# Patient Record
Sex: FEMALE | Race: OTHER | HISPANIC OR LATINO | URBAN - METROPOLITAN AREA
[De-identification: names, ages, dates, MRNs, and addresses within clinical notes are randomized per-mention and may not be internally consistent; named-entity substitution may affect disease eponyms.]

---

## 2020-09-07 ENCOUNTER — EMERGENCY (EMERGENCY)
Facility: HOSPITAL | Age: 45
LOS: 1 days | Discharge: HOME | End: 2020-09-07
Admitting: EMERGENCY MEDICINE
Payer: COMMERCIAL

## 2020-09-07 VITALS
RESPIRATION RATE: 18 BRPM | HEART RATE: 72 BPM | DIASTOLIC BLOOD PRESSURE: 79 MMHG | OXYGEN SATURATION: 100 % | SYSTOLIC BLOOD PRESSURE: 174 MMHG

## 2020-09-07 VITALS
TEMPERATURE: 98 F | DIASTOLIC BLOOD PRESSURE: 79 MMHG | HEART RATE: 75 BPM | SYSTOLIC BLOOD PRESSURE: 171 MMHG | OXYGEN SATURATION: 100 % | RESPIRATION RATE: 18 BRPM

## 2020-09-07 DIAGNOSIS — Z98.890 OTHER SPECIFIED POSTPROCEDURAL STATES: Chronic | ICD-10-CM

## 2020-09-07 DIAGNOSIS — Z90.711 ACQUIRED ABSENCE OF UTERUS WITH REMAINING CERVICAL STUMP: Chronic | ICD-10-CM

## 2020-09-07 PROCEDURE — 99283 EMERGENCY DEPT VISIT LOW MDM: CPT

## 2020-09-07 PROCEDURE — 73610 X-RAY EXAM OF ANKLE: CPT | Mod: 26,RT

## 2020-09-07 RX ORDER — IBUPROFEN 200 MG
600 TABLET ORAL ONCE
Refills: 0 | Status: COMPLETED | OUTPATIENT
Start: 2020-09-07 | End: 2020-09-07

## 2020-09-07 RX ADMIN — Medication 600 MILLIGRAM(S): at 14:44

## 2020-09-07 NOTE — ED PROVIDER NOTE - PHYSICAL EXAMINATION
RLE: NV intact, +swelling and TTP over lateral malleolus, moving all toes,  No TTP to foot/knee.  FROM of knee.

## 2020-09-07 NOTE — ED PROVIDER NOTE - NSFOLLOWUPINSTRUCTIONS_ED_ALL_ED_FT
Follow up with orthopedics in 1-2 days.  (See list attached)  Rest, Ice 3-4 x a day x 48hours, elevate ankle, ace/aircast.  Use crutches to ambulate.  Take Ibuprofen 600mg orally every 8 hours as needed for pain take with food.  Return to the ER for any persistent/worsening or new symptoms weakness, numbness or any concerning symptoms. Follow up with orthopedics in 1-2 days.  (See list attached)    Follow up with your Doctor for blood pressure check.  Rest, Ice 3-4 x a day x 48hours, elevate ankle, ace/aircast.    Use crutches to ambulate.    Take Ibuprofen 400mg orally every 6 hours as needed for pain take with food.    Return to the ER for any persistent/worsening or new symptoms weakness, numbness or any concerning symptoms.

## 2020-09-07 NOTE — ED PROVIDER NOTE - CPE EDP CARDIAC NORM
Quality 431: Preventive Care And Screening: Unhealthy Alcohol Use - Screening: Patient screened for unhealthy alcohol use using a single question and scores less than 2 times per year Quality 130: Documentation Of Current Medications In The Medical Record: Current Medications Documented Quality 226: Preventive Care And Screening: Tobacco Use: Screening And Cessation Intervention: Patient screened for tobacco and is an ex-smoker Detail Level: Detailed Quality 110: Preventive Care And Screening: Influenza Immunization: Influenza Immunization Ordered or Recommended, but not Administered due to system reason Additional Notes: Thomas carpenter want flu vaccine normal...

## 2020-09-07 NOTE — ED PROVIDER NOTE - OBJECTIVE STATEMENT
45 y/o female with no significant PMHx presents to the ER c/o right ankle pain and swelling s/p injury prior to arrival.  Pt states her foot got caught in a crack and her ankle twisted and she fell to the floor.  Pt denies head trauma, loc, weakness, numbness, tingling or any other injuries.

## 2020-09-07 NOTE — ED ADULT TRIAGE NOTE - CHIEF COMPLAINT QUOTE
PT c/o right ankle swelling, pain and limited mobility. Pt tripped on a crack in the sidewalk and twisted her ankle about an hour ago. PT denies any LOC, head trauma or PMH

## 2020-09-14 ENCOUNTER — EMERGENCY (EMERGENCY)
Facility: HOSPITAL | Age: 45
LOS: 1 days | Discharge: HOME | End: 2020-09-14
Attending: HOSPITALIST | Admitting: HOSPITALIST
Payer: COMMERCIAL

## 2020-09-14 VITALS
HEART RATE: 80 BPM | RESPIRATION RATE: 17 BRPM | OXYGEN SATURATION: 99 % | SYSTOLIC BLOOD PRESSURE: 141 MMHG | DIASTOLIC BLOOD PRESSURE: 95 MMHG

## 2020-09-14 VITALS
SYSTOLIC BLOOD PRESSURE: 150 MMHG | OXYGEN SATURATION: 99 % | TEMPERATURE: 99 F | RESPIRATION RATE: 18 BRPM | HEART RATE: 87 BPM | DIASTOLIC BLOOD PRESSURE: 88 MMHG

## 2020-09-14 DIAGNOSIS — Z98.890 OTHER SPECIFIED POSTPROCEDURAL STATES: Chronic | ICD-10-CM

## 2020-09-14 DIAGNOSIS — Z90.711 ACQUIRED ABSENCE OF UTERUS WITH REMAINING CERVICAL STUMP: Chronic | ICD-10-CM

## 2020-09-14 PROCEDURE — 99282 EMERGENCY DEPT VISIT SF MDM: CPT

## 2020-09-14 RX ORDER — IBUPROFEN 200 MG
600 TABLET ORAL ONCE
Refills: 0 | Status: COMPLETED | OUTPATIENT
Start: 2020-09-14 | End: 2020-09-14

## 2020-09-14 RX ADMIN — Medication 600 MILLIGRAM(S): at 20:48

## 2020-09-14 NOTE — ED PROVIDER NOTE - NS ED ROS FT
Gen: No fever, chills, night sweats. Normal appetite  Eyes: No changes in vision   ENT: No ear pain, congestion, sore throat  Resp: No cough or trouble breathing  Cardiovascular: No chest pain or palpitation  Gastroenteric: No nausea, vomiting, diarrhea or constipation  :  No change in urine output, dysuria or hematuria   MS: No joint or muscle pain  Skin: No rashes, lacerations, wounds or abrasions   Neuro: No headache; no abnormal movements

## 2020-09-14 NOTE — ED PROVIDER NOTE - OBJECTIVE STATEMENT
43 y/o female with no significant PMHx presents to ED c/o R ankle pain after falling 1 week ago. Pt twisted her R ankle while walking and fell on pavement. She presented to ED after for evaluation where an x-ray of her R ankle was found to be normal and was sent home with Motrin and and air cast on her R ankle. 2 days after ED visit, pt took off the air cast and tried to walk but ankle was still painful and pt was unable to ambulate. Pt has since noticed increased swelling of R leg. No fevers or chills, night sweats, laceration or abrasions over the areaa. No other acute complaints at time of eval.

## 2020-09-14 NOTE — ED ADULT TRIAGE NOTE - CHIEF COMPLAINT QUOTE
Pt complaining of R ankle pain and swelling. Pt states she was seen in the ED a week ago and is not feeling better.

## 2020-09-14 NOTE — ED PROVIDER NOTE - NSFOLLOWUPCLINICS_GEN_ALL_ED_FT
[facility] Orthopedic Surgery  Orthopedic Surgery  [address], [address]rd & [address]th [place] [address]  Phone: [phone]  Fax:   Follow Up Time:

## 2020-09-14 NOTE — ED PROVIDER NOTE - PATIENT PORTAL LINK FT
You can access the FollowMyHealth Patient Portal offered by [facility] by registering at the following website: [email or web address]. By joining [employer]’s FollowMyHealth portal, you will also be able to view your health information using other applications (apps) compatible with our system.

## 2020-09-14 NOTE — ED PROVIDER NOTE - CLINICAL SUMMARY MEDICAL DECISION MAKING FREE TEXT BOX
43 y/o female with no pertinent PMHx with R ankle pain. Suspect injury sustained one week ago. Inquired if pt would like CT as some fractures can be missed by x-ray, pt denied inquiry and will f/u with orthopedics outpatient. Asked pt to continue Motrin and Tylenol on an as needed basis. Will wrap with ace bandage and send her home. 45 y/o female with no pertinent PMHx with R ankle pain. Suspect injury sustained one week ago. Inquired if pt would like CT as some fractures can be missed by x-ray, pt denied inquiry and will f/u with orthopedics outpatient. Asked pt to continue Motrin and Tylenol on an as needed basis. Will wrap with ace bandage and dc home with f/u with ortho, strict return precautions advised, pt voiced understanding. Will give work note, ok to return keep R foot elevated and use ice/hot packs as needed.

## 2020-09-14 NOTE — ED ADULT NURSE NOTE - OBJECTIVE STATEMENT
44 year old female, was here a week ago for right ankle pain secondary to a fall, as per pt she was told she had a sprain and it should improve after two days, not improvement as per pt, pain and swelling got worst. Pain8/10, feels like pressure and radiating from  ankle to shin. edema noted to ankle (+2), foot and shin. no discoloration noted, warm to touch.  pedal pulse present,   pt able to feel sensation of right foot, pt able to move toes. Limited ROM in yunier. Pt medicated for pain will monitor pt.

## 2020-09-14 NOTE — ED PROVIDER NOTE - ATTENDING CONTRIBUTION TO CARE
44F present with right ankle pain after twisting it a week ago. patient was seen in ED and xrays negative for fx. since has noted bruising and swelling despite taking ibuprofen for pain and using the aircast and crutches for the first few days since injury but now states she cant wear the air cast bc it is too uncomfortable. no calf pain. no fevers or re-injury.     ***GEN - NAD; well appearing; A+O x3 ***HEAD - NC/AT ***EYES/NOSE - PERRL, EOMI, mucous membranes moist, no discharge ***THROAT: Oral cavity and pharynx normal. No inflammation, swelling, exudate, or lesions.  ***NECK: Neck supple, non-tender without lymphadenopathy, no masses, no thyromegaly.  ***PULMONARY - CTA b/l, symmetric breath sounds. ***CARDIAC -s1s2, RRR, no M,G,R  ***ABDOMEN - +BS, ND, NT, soft, no guarding, no rebound, no masses   ***BACK - no CVA tenderness, Normal  spine ***EXTREMITIES - swelling to right ankle. symmetric pulses, 2+ dp, capillary refill < 2 seconds, no clubbing, no cyanosis, no edema ***SKIN - bruising to right ankle and lateral foot.  ***NEUROLOGIC - alert, CN 2-12 intact    MDM: 44F with right ankle sprain. will ace wrap and made suggestions for elevation, nsaids and ortho f/u as outpt given.

## 2020-09-14 NOTE — ED PROVIDER NOTE - PHYSICAL EXAMINATION
MSK: +swelling of lateral malleolus on R foot. +ttp to lateral malleolus on r side.  +decreased ROM of R ankle secondary to pain.     Skin:  + bruising on R mid tibial shaft. No open wounds, lacerations or abrasions. MSK: +swelling of lateral malleolus on R foot. +ttp to lateral malleolus on r side.  +decreased ROM of R ankle secondary to pain.   Skin:  + bruising on R mid tibial shaft. No open wounds, lacerations or abrasions.  CONSTITUTIONAL: NAD. Awake and conversive, cooperative with exam  EYES: EOMI, conjunctiva and sclera clear  ENMT: MMM, no pharyngeal injection or exudates   NECK: Supple, no palpable masses, no JVD  RESPIRATORY: Normal respiratory effort, CTAB, no wheezes, rales, or rhonchi  CARDIOVASCULAR: RRR, normal S1 and S2, no MRG, distal pulses 2+ bilaterally, capillary refill < 2 seconds, no peripheral edema  ABDOMEN: Soft, non-distended, no TTP, +BS, no rebound/guarding  NEURO: AO to person, place, and time; following commands, moving all extremities spontaneously  PSYCH: appropriate affect

## 2020-09-14 NOTE — ED PROVIDER NOTE - NSFOLLOWUPINSTRUCTIONS_ED_ALL_ED_FT
You were seen in the emergency department for right ankle pain.     - Please continue taking all of your home medications as directed.  - You may alternate between Tylenol and Ibuprofen as needed for the pain. This is an over-the-counter medications - please respect the warnings on the label. This medication come with certain risks and side effects that you need to discuss with your doctor, especially if you are taking it for a prolonged period.  - Please follow up with your PMD in the next 24-48hrs.  - Please follow up with an orthopedic doctor within the next 7-10 days.  - Please return to the ED if you have any new or concerning symptoms.  - If you have any severe increase in pain, continued inability to ambulate, feel numbness or tingling in the extremity you need to come right back to the emergency room.

## 2021-09-30 ENCOUNTER — EMERGENCY (EMERGENCY)
Facility: HOSPITAL | Age: 46
LOS: 1 days | Discharge: HOME | End: 2021-09-30
Attending: EMERGENCY MEDICINE | Admitting: EMERGENCY MEDICINE
Payer: COMMERCIAL

## 2021-09-30 VITALS
DIASTOLIC BLOOD PRESSURE: 95 MMHG | SYSTOLIC BLOOD PRESSURE: 144 MMHG | TEMPERATURE: 97 F | RESPIRATION RATE: 16 BRPM | HEART RATE: 90 BPM | OXYGEN SATURATION: 100 %

## 2021-09-30 DIAGNOSIS — Z90.711 ACQUIRED ABSENCE OF UTERUS WITH REMAINING CERVICAL STUMP: Chronic | ICD-10-CM

## 2021-09-30 DIAGNOSIS — Z98.890 OTHER SPECIFIED POSTPROCEDURAL STATES: Chronic | ICD-10-CM

## 2021-09-30 PROCEDURE — 99283 EMERGENCY DEPT VISIT LOW MDM: CPT

## 2021-09-30 RX ADMIN — Medication 1 TABLET(S): at 23:51

## 2021-09-30 RX ADMIN — Medication 300 MILLIGRAM(S): at 23:51

## 2021-09-30 NOTE — ED PROVIDER NOTE - CLINICAL SUMMARY MEDICAL DECISION MAKING FREE TEXT BOX
45yF w/no pmhx presenting with right eyelid pain and swelling x 2 days after plucking hair from eyebrow. On exam pt is well appearing, afebrile. Left eyelid swelling/erythema, small area of induration surrounding small scab below left eyebrow. no pain with EOMs. Concern for preseptal cellulitis. Plan: oral abx, warm compress

## 2021-09-30 NOTE — ED PROVIDER NOTE - OBJECTIVE STATEMENT
45yF w/no pmhx presenting with right eyelid pain and swelling. Pt states 2 days ago she tried to pluck out a hair from her eyebrow, thought she saw a black spot and was concerned for an ingrown hair. Since then pt has pain, swelling and redness to the area, worse today. Pt denies visual changes, eye pain, tearing, drainage from eye, headache, dizziness, weakness, URI symptoms, cp, sob, abd pain, n/v/d, recent travel or illness or any other concerns. 45yF w/no pmhx presenting with right eyelid pain and swelling. Pt states 2 days ago she tried to pluck out a hair from her eyebrow, thought she saw a black spot and was concerned for an ingrown hair. Since then pt has pain, swelling and redness to the area, worse today. Pt denies visual changes, eye pain, tearing, drainage from eye, headache, dizziness, weakness, URI symptoms, cp, sob, abd pain, n/v/d, recent travel or illness or any other concerns.    Attending/Ina: 46 yo F as described above, p/w left eye pain for the past two days. After plucking her eyebrow pt develop left periorbital pain. Pt though she had an ingorwn hair and attempted to use a tweezer to probe the area. Pt denies fever/chills, change in eyesight, HA, n/v. 45yF w/no pmhx presenting with left eyelid pain and swelling. Pt states 2 days ago she tried to pluck out a hair from her eyebrow, thought she saw a black spot and was concerned for an ingrown hair. Since then pt has pain, swelling and redness to the area, worse today. Pt denies visual changes, eye pain, tearing, drainage from eye, headache, dizziness, weakness, URI symptoms, cp, sob, abd pain, n/v/d, recent travel or illness or any other concerns.    Attending/Ina: 44 yo F as described above, p/w left eye pain for the past two days. After plucking her eyebrow pt develop left periorbital pain. Pt though she had an ingorwn hair and attempted to use a tweezer to probe the area. Pt denies fever/chills, change in eyesight, HA, n/v.

## 2021-09-30 NOTE — ED ADULT TRIAGE NOTE - CHIEF COMPLAINT QUOTE
Pt. with swelling above L eye. stating she was plucking her eyebrow a few days ago and recently noted swelling above her eye that is getting worst. noted with a boil above L eye.

## 2021-09-30 NOTE — ED PROVIDER NOTE - PHYSICAL EXAMINATION
left upper eyelid with swelling, erythema, small area of induration surrounding small scab below left eyebrow  no pain with EOMS  PERRL, conjunctiva clear b/l left upper eyelid with swelling, erythema, small area of induration surrounding small scab below left eyebrow  no pain with EOMS  PERRL, conjunctiva clear b/l    Attending/Ina: NAD, PERRL/EOMI, no proptosis, left periorbital/eyelid swelling, +erythema; supple, RRR, CTAB, Abd-soft, NT/ND, no LE edema, +2 DP/PT, A&Ox3, nonfocal

## 2021-09-30 NOTE — ED PROVIDER NOTE - NSFOLLOWUPINSTRUCTIONS_ED_ALL_ED_FT
Follow up with your primary care doctor within 1 week  Take Augmentin 875mg (1 tablet) twice daily for 7 days  Take Clindamycin 300mg (1 tablet) three times a day for 7 days  Warm compress to area  Take Tylenol 650mg every 6 hours as needed for pain  Return to the ER with any worsening or concerning symptoms, increased pain, spread of redness, swelling, fever/chills, eye pain or any other concerns.

## 2021-09-30 NOTE — ED PROVIDER NOTE - PATIENT PORTAL LINK FT
You can access the FollowMyHealth Patient Portal offered by [facility] by registering at the following website: [email or web address]. By joining [employer]’s FollowMyHealth portal, you will also be able to view your health information using other applications (apps) compatible with our system.

## 2021-10-03 ENCOUNTER — EMERGENCY (EMERGENCY)
Facility: HOSPITAL | Age: 46
LOS: 1 days | Discharge: HOME | End: 2021-10-03
Attending: EMERGENCY MEDICINE | Admitting: EMERGENCY MEDICINE
Payer: COMMERCIAL

## 2021-10-03 VITALS
SYSTOLIC BLOOD PRESSURE: 139 MMHG | OXYGEN SATURATION: 100 % | DIASTOLIC BLOOD PRESSURE: 93 MMHG | HEART RATE: 90 BPM | TEMPERATURE: 98 F | WEIGHT: 167.99 LBS | RESPIRATION RATE: 18 BRPM

## 2021-10-03 DIAGNOSIS — Z90.711 ACQUIRED ABSENCE OF UTERUS WITH REMAINING CERVICAL STUMP: Chronic | ICD-10-CM

## 2021-10-03 DIAGNOSIS — Z98.890 OTHER SPECIFIED POSTPROCEDURAL STATES: Chronic | ICD-10-CM

## 2021-10-03 LAB
A1C WITH ESTIMATED AVERAGE GLUCOSE RESULT: 12.7 % — HIGH (ref 4–5.6)
ALBUMIN SERPL ELPH-MCNC: 4.5 G/DL — SIGNIFICANT CHANGE UP (ref 3.3–5)
ALP SERPL-CCNC: 138 U/L — HIGH (ref 40–120)
ALT FLD-CCNC: 271 U/L — HIGH (ref 4–33)
ANION GAP SERPL CALC-SCNC: 13 MMOL/L — SIGNIFICANT CHANGE UP (ref 7–14)
AST SERPL-CCNC: 251 U/L — HIGH (ref 4–32)
B PERT DNA SPEC QL NAA+PROBE: SIGNIFICANT CHANGE UP
B PERT+PARAPERT DNA PNL SPEC NAA+PROBE: SIGNIFICANT CHANGE UP
B-OH-BUTYR SERPL-SCNC: <0 MMOL/L — SIGNIFICANT CHANGE UP (ref 0–0.4)
BASOPHILS # BLD AUTO: 0.05 K/UL — SIGNIFICANT CHANGE UP (ref 0–0.2)
BASOPHILS NFR BLD AUTO: 0.6 % — SIGNIFICANT CHANGE UP (ref 0–2)
BILIRUB SERPL-MCNC: 0.9 MG/DL — SIGNIFICANT CHANGE UP (ref 0.2–1.2)
BLOOD GAS VENOUS COMPREHENSIVE RESULT: SIGNIFICANT CHANGE UP
BORDETELLA PARAPERTUSSIS (RAPRVP): SIGNIFICANT CHANGE UP
BUN SERPL-MCNC: 8 MG/DL — SIGNIFICANT CHANGE UP (ref 7–23)
C PNEUM DNA SPEC QL NAA+PROBE: SIGNIFICANT CHANGE UP
CALCIUM SERPL-MCNC: 9.6 MG/DL — SIGNIFICANT CHANGE UP (ref 8.4–10.5)
CHLORIDE SERPL-SCNC: 99 MMOL/L — SIGNIFICANT CHANGE UP (ref 98–107)
CO2 SERPL-SCNC: 26 MMOL/L — SIGNIFICANT CHANGE UP (ref 22–31)
CREAT SERPL-MCNC: 0.42 MG/DL — LOW (ref 0.5–1.3)
EOSINOPHIL # BLD AUTO: 0.31 K/UL — SIGNIFICANT CHANGE UP (ref 0–0.5)
EOSINOPHIL NFR BLD AUTO: 3.4 % — SIGNIFICANT CHANGE UP (ref 0–6)
ESTIMATED AVERAGE GLUCOSE: 318 — SIGNIFICANT CHANGE UP
FLUAV SUBTYP SPEC NAA+PROBE: SIGNIFICANT CHANGE UP
FLUBV RNA SPEC QL NAA+PROBE: SIGNIFICANT CHANGE UP
GLUCOSE SERPL-MCNC: 355 MG/DL — HIGH (ref 70–99)
HADV DNA SPEC QL NAA+PROBE: SIGNIFICANT CHANGE UP
HCOV 229E RNA SPEC QL NAA+PROBE: SIGNIFICANT CHANGE UP
HCOV HKU1 RNA SPEC QL NAA+PROBE: SIGNIFICANT CHANGE UP
HCOV NL63 RNA SPEC QL NAA+PROBE: SIGNIFICANT CHANGE UP
HCOV OC43 RNA SPEC QL NAA+PROBE: SIGNIFICANT CHANGE UP
HCT VFR BLD CALC: 45 % — SIGNIFICANT CHANGE UP (ref 34.5–45)
HGB BLD-MCNC: 15.7 G/DL — HIGH (ref 11.5–15.5)
HMPV RNA SPEC QL NAA+PROBE: SIGNIFICANT CHANGE UP
HPIV1 RNA SPEC QL NAA+PROBE: SIGNIFICANT CHANGE UP
HPIV2 RNA SPEC QL NAA+PROBE: SIGNIFICANT CHANGE UP
HPIV3 RNA SPEC QL NAA+PROBE: SIGNIFICANT CHANGE UP
HPIV4 RNA SPEC QL NAA+PROBE: SIGNIFICANT CHANGE UP
IANC: 5.97 K/UL — SIGNIFICANT CHANGE UP (ref 1.5–8.5)
IMM GRANULOCYTES NFR BLD AUTO: 0.4 % — SIGNIFICANT CHANGE UP (ref 0–1.5)
LYMPHOCYTES # BLD AUTO: 1.9 K/UL — SIGNIFICANT CHANGE UP (ref 1–3.3)
LYMPHOCYTES # BLD AUTO: 21 % — SIGNIFICANT CHANGE UP (ref 13–44)
M PNEUMO DNA SPEC QL NAA+PROBE: SIGNIFICANT CHANGE UP
MCHC RBC-ENTMCNC: 30.9 PG — SIGNIFICANT CHANGE UP (ref 27–34)
MCHC RBC-ENTMCNC: 34.9 GM/DL — SIGNIFICANT CHANGE UP (ref 32–36)
MCV RBC AUTO: 88.6 FL — SIGNIFICANT CHANGE UP (ref 80–100)
MONOCYTES # BLD AUTO: 0.77 K/UL — SIGNIFICANT CHANGE UP (ref 0–0.9)
MONOCYTES NFR BLD AUTO: 8.5 % — SIGNIFICANT CHANGE UP (ref 2–14)
NEUTROPHILS # BLD AUTO: 5.97 K/UL — SIGNIFICANT CHANGE UP (ref 1.8–7.4)
NEUTROPHILS NFR BLD AUTO: 66.1 % — SIGNIFICANT CHANGE UP (ref 43–77)
NRBC # BLD: 0 /100 WBCS — SIGNIFICANT CHANGE UP
NRBC # FLD: 0 K/UL — SIGNIFICANT CHANGE UP
PLATELET # BLD AUTO: 273 K/UL — SIGNIFICANT CHANGE UP (ref 150–400)
POTASSIUM SERPL-MCNC: 3.8 MMOL/L — SIGNIFICANT CHANGE UP (ref 3.5–5.3)
POTASSIUM SERPL-SCNC: 3.8 MMOL/L — SIGNIFICANT CHANGE UP (ref 3.5–5.3)
PROT SERPL-MCNC: 8.2 G/DL — SIGNIFICANT CHANGE UP (ref 6–8.3)
RAPID RVP RESULT: SIGNIFICANT CHANGE UP
RBC # BLD: 5.08 M/UL — SIGNIFICANT CHANGE UP (ref 3.8–5.2)
RBC # FLD: 12.4 % — SIGNIFICANT CHANGE UP (ref 10.3–14.5)
RSV RNA SPEC QL NAA+PROBE: SIGNIFICANT CHANGE UP
RV+EV RNA SPEC QL NAA+PROBE: SIGNIFICANT CHANGE UP
SARS-COV-2 RNA SPEC QL NAA+PROBE: SIGNIFICANT CHANGE UP
SODIUM SERPL-SCNC: 138 MMOL/L — SIGNIFICANT CHANGE UP (ref 135–145)
WBC # BLD: 9.04 K/UL — SIGNIFICANT CHANGE UP (ref 3.8–10.5)
WBC # FLD AUTO: 9.04 K/UL — SIGNIFICANT CHANGE UP (ref 3.8–10.5)

## 2021-10-03 PROCEDURE — 99218: CPT

## 2021-10-03 PROCEDURE — 70487 CT MAXILLOFACIAL W/DYE: CPT | Mod: 26

## 2021-10-03 RX ORDER — INSULIN LISPRO 100/ML
5 VIAL (ML) SUBCUTANEOUS
Refills: 0 | Status: DISCONTINUED | OUTPATIENT
Start: 2021-10-04 | End: 2021-10-07

## 2021-10-03 RX ORDER — DEXTROSE 50 % IN WATER 50 %
15 SYRINGE (ML) INTRAVENOUS ONCE
Refills: 0 | Status: DISCONTINUED | OUTPATIENT
Start: 2021-10-03 | End: 2021-10-07

## 2021-10-03 RX ORDER — DEXTROSE 50 % IN WATER 50 %
25 SYRINGE (ML) INTRAVENOUS ONCE
Refills: 0 | Status: DISCONTINUED | OUTPATIENT
Start: 2021-10-03 | End: 2021-10-07

## 2021-10-03 RX ORDER — INSULIN LISPRO 100/ML
VIAL (ML) SUBCUTANEOUS
Refills: 0 | Status: DISCONTINUED | OUTPATIENT
Start: 2021-10-03 | End: 2021-10-04

## 2021-10-03 RX ORDER — SODIUM CHLORIDE 9 MG/ML
1000 INJECTION INTRAMUSCULAR; INTRAVENOUS; SUBCUTANEOUS ONCE
Refills: 0 | Status: COMPLETED | OUTPATIENT
Start: 2021-10-03 | End: 2021-10-03

## 2021-10-03 RX ORDER — SODIUM CHLORIDE 9 MG/ML
1000 INJECTION, SOLUTION INTRAVENOUS
Refills: 0 | Status: DISCONTINUED | OUTPATIENT
Start: 2021-10-03 | End: 2021-10-07

## 2021-10-03 RX ORDER — INSULIN GLARGINE 100 [IU]/ML
16 INJECTION, SOLUTION SUBCUTANEOUS AT BEDTIME
Refills: 0 | Status: DISCONTINUED | OUTPATIENT
Start: 2021-10-03 | End: 2021-10-07

## 2021-10-03 RX ORDER — INSULIN LISPRO 100/ML
5 VIAL (ML) SUBCUTANEOUS
Refills: 0 | Status: DISCONTINUED | OUTPATIENT
Start: 2021-10-03 | End: 2021-10-04

## 2021-10-03 RX ORDER — GLUCAGON INJECTION, SOLUTION 0.5 MG/.1ML
1 INJECTION, SOLUTION SUBCUTANEOUS ONCE
Refills: 0 | Status: DISCONTINUED | OUTPATIENT
Start: 2021-10-03 | End: 2021-10-07

## 2021-10-03 RX ORDER — DEXTROSE 50 % IN WATER 50 %
12.5 SYRINGE (ML) INTRAVENOUS ONCE
Refills: 0 | Status: DISCONTINUED | OUTPATIENT
Start: 2021-10-03 | End: 2021-10-07

## 2021-10-03 RX ADMIN — Medication 5 UNIT(S): at 20:35

## 2021-10-03 RX ADMIN — INSULIN GLARGINE 16 UNIT(S): 100 INJECTION, SOLUTION SUBCUTANEOUS at 21:47

## 2021-10-03 RX ADMIN — SODIUM CHLORIDE 1000 MILLILITER(S): 9 INJECTION INTRAMUSCULAR; INTRAVENOUS; SUBCUTANEOUS at 17:12

## 2021-10-03 RX ADMIN — SODIUM CHLORIDE 100 MILLILITER(S): 9 INJECTION, SOLUTION INTRAVENOUS at 21:48

## 2021-10-03 RX ADMIN — Medication 2: at 20:35

## 2021-10-03 RX ADMIN — Medication 900 MILLIGRAM(S): at 21:30

## 2021-10-03 RX ADMIN — Medication 100 MILLIGRAM(S): at 21:47

## 2021-10-03 NOTE — CONSULT NOTE ADULT - SUBJECTIVE AND OBJECTIVE BOX
[facility] DEPARTMENT OF OPHTHALMOLOGY - INITIAL ADULT CONSULT  -----------------------------------------------------------------------------------------------------------------  Jaun Easton MD PGY 3  Pager: [phone]  -----------------------------------------------------------------------------------------------------------------    HPI: 46 y/o F with PMH partial hysterectomy, LASIK surgery OU (10 years ago) presents with localized L superior periorbital edema for 6 days. Pt tweezed eyebrows 8 days ago and a few days afterwards, she noticed a "bump" superior to lateral canthus L eye with a black spot in the middle. Pt attempted to remove the black spot with a tweezer but does not believe she was successful. Pt squeezed the bump and blood would drain. Surrounding the protrusion, pt noticed edema . Pt also stated L vision was blurry, though that resolved. Pt went to ED on 9/30 and was Dx with preseptal cellulitis and Rx Augmentin PO and Clinda PO (ophtho did not evaluate). Pt came into ED due to worsening pain around the protrusion when area is palpated.     Past Medical History: Partial hysterectomy   Past Ocular History: LASIK surgery OU (10 years ago)  Drops: None  Allergies: No known allergies to medications   Family History: No known family history of eye diseases.   Outpatient Ophthalmologist: None      Review of Systems:  Constitutional: No fever, chills  Eyes: No blurry vision, flashes, floaters, FBS, erythema, discharge, double vision, OU  Neuro: No tremors  Cardiovascular: No chest pain, palpitations  Respiratory: No SOB, no cough  GI: No nausea, vomiting, abdominal pain    Vital Signs: T(C): 36.7 (10-03-21 @ 12:05)  T(F): 98 (10-03-21 @ 12:05), Max: 98 (10-03-21 @ 12:05)  HR: 79 (10-03-21 @ 14:24) (79 - 90)  BP: 139/86 (10-03-21 @ 14:24) (139/86 - 139/93)  RR:  (18 - 18)  SpO2:  (100% - 100%)  Wt(kg): --    Ophthalmology Exam:  Visual acuity (cc): 20/20 OU.  Pupils: PERRL OU, no APD  Intraocular Pressure:    Extraocular movements (EOMs): Full OU, no pain, no diplopia.  Confrontational Visual Field (CVF): Full OU.  Color Plates: 12/12 OU.    Pen Light Exam (PLE)  External: Normal OU.  Lids/Lashes/Lacrimal Ducts: Flat OU.  Sclera/Conjunctiva: White and quiet OU.  Cornea: Clear OU.  Anterior Chamber: Deep and formed OU.    Iris: Flat OU.  Lens: Clear OU.    Fundus Exam: dilated with 1% tropicamide and 2.5% phenylephrine  Approval obtained from primary team for dilation  Patient aware that pupils can remained dilated for at least 4-6 hours.  Exam performed with 20 D lens    Vitreous: wnl OU  Disc, cup/disc: sharp and pink, 0.4 OU  Macula: wnl OU  Vessels: wnl OU  Periphery: wnl OU    Labs/Imaging:  CT Max with IV contrast: Left orbital preseptal cellulitis with no post septal component. No drainable fluid collection or abscess. Very large retention cysts versus polyps in the bilateral maxillary sinuses are present, left greater than right.    [facility] DEPARTMENT OF OPHTHALMOLOGY - INITIAL ADULT CONSULT  -----------------------------------------------------------------------------------------------------------------  Jaun Easton MD PGY 3  Pager: [phone]  -----------------------------------------------------------------------------------------------------------------    HPI: 44 y/o F with PMH partial hysterectomy, LASIK surgery OU (10 years ago) presents with localized L superior periorbital edema for 6 days. Pt tweezed eyebrows 8 days ago and a few days afterwards, she noticed a "bump" superior to lateral canthus L eye with a black spot in the middle. Pt attempted to remove the black spot with a tweezer but does not believe she was successful. Pt squeezed the bump and blood would drain. Surrounding the protrusion, pt noticed edema . Pt also stated L vision was blurry, though that resolved. Pt went to ED on 9/30 and was Dx with preseptal cellulitis and Rx Augmentin PO and Clinda PO (ophtho did not evaluate). Pt came into ED due to worsening pain around the protrusion when area is palpated. Pt reports having bump on RLL for 2 years.    Past Medical History: Partial hysterectomy   Past Ocular History: LASIK surgery OU (10 years ago)  Drops: None  Allergies: No known allergies to medications   Family History: No known family history of eye diseases.   Outpatient Ophthalmologist: None      Review of Systems:  Constitutional: No fever, chills  Eyes: No blurry vision, flashes, floaters, FBS, erythema, discharge, double vision, OU  Neuro: No tremors  Cardiovascular: No chest pain, palpitations  Respiratory: No SOB, no cough  GI: No nausea, vomiting, abdominal pain    Vital Signs: T(C): 36.7 (10-03-21 @ 12:05)  T(F): 98 (10-03-21 @ 12:05), Max: 98 (10-03-21 @ 12:05)  HR: 79 (10-03-21 @ 14:24) (79 - 90)  BP: 139/86 (10-03-21 @ 14:24) (139/86 - 139/93)  RR:  (18 - 18)  SpO2:  (100% - 100%)  Wt(kg): --    Ophthalmology Exam:  Visual acuity (sc): 20/20 OU.  Pupils: PERRL OU, no APD  Intraocular Pressure: 11, 10   Extraocular movements (EOMs): Full OU, no pain, no diplopia.  Confrontational Visual Field (CVF): Full OU.  Color Plates: 12/12 OU.    Slit Lamp Exam  External: Normal OD. 1 cm localized, erythematous indurated protrusion with scabbed over prior drainage site, tender to palpation; trace inferior periorbital edema OS  Lids/Lashes/Lacrimal Ducts: Chalazion RLL near medial canthus OD. Flat OS  Sclera/Conjunctiva: White and quiet OU.  Cornea: Clear OU.  Anterior Chamber: Deep and formed OU.    Iris: Flat OU.  Lens: Clear OU.    Fundus Exam: dilated with 1% tropicamide and 2.5% phenylephrine 4:52pm  Approval obtained from primary team for dilation  Patient aware that pupils can remained dilated for at least 4-6 hours.  Exam performed with 20 D lens    __________    Labs/Imaging:  CT Max with IV contrast: Left orbital preseptal cellulitis with no post septal component. No drainable fluid collection or abscess. Very large retention cysts versus polyps in the bilateral maxillary sinuses are present, left greater than right.    [facility] DEPARTMENT OF OPHTHALMOLOGY - INITIAL ADULT CONSULT  -----------------------------------------------------------------------------------------------------------------  Jaun Easton MD PGY 3  Pager: [phone]  -----------------------------------------------------------------------------------------------------------------    HPI: 44 y/o F with PMH partial hysterectomy, LASIK surgery OU (10 years ago) presents with localized L superior periorbital edema for 6 days. Pt tweezed eyebrows 8 days ago and a few days afterwards, she noticed a "bump" superior to lateral canthus L eye with a black spot in the middle. Pt attempted to remove the black spot with a tweezer but does not believe she was successful. Pt squeezed the bump and blood would drain. Surrounding the protrusion, pt noticed edema . Pt also stated L vision was blurry, though that resolved. Pt went to ED on 9/30 and was Dx with preseptal cellulitis and Rx Augmentin PO and Clinda PO (ophtho did not evaluate). Pt came into ED due to worsening pain around the protrusion when area is palpated. Pt reports having bump on RLL for 2 years.    Past Medical History: Partial hysterectomy   Past Ocular History: LASIK surgery OU (10 years ago)  Drops: None  Allergies: No known allergies to medications   Family History: No known family history of eye diseases.   Outpatient Ophthalmologist: None      Review of Systems:  Constitutional: No fever, chills  Eyes: No blurry vision, flashes, floaters, FBS, erythema, discharge, double vision, OU  Neuro: No tremors  Cardiovascular: No chest pain, palpitations  Respiratory: No SOB, no cough  GI: No nausea, vomiting, abdominal pain    Vital Signs: T(C): 36.7 (10-03-21 @ 12:05)  T(F): 98 (10-03-21 @ 12:05), Max: 98 (10-03-21 @ 12:05)  HR: 79 (10-03-21 @ 14:24) (79 - 90)  BP: 139/86 (10-03-21 @ 14:24) (139/86 - 139/93)  RR:  (18 - 18)  SpO2:  (100% - 100%)  Wt(kg): --    Ophthalmology Exam:  Visual acuity (sc): 20/20 OU.  Pupils: PERRL OU, no APD  Intraocular Pressure: 11, 10   Extraocular movements (EOMs): Full OU, no pain, no diplopia.  Confrontational Visual Field (CVF): Full OU.  Color Plates: 12/12 OU.    Slit Lamp Exam  External: Normal OD. 1 cm localized, erythematous indurated protrusion with scabbed over prior drainage site, tender to palpation; trace inferior periorbital edema OS  Lids/Lashes/Lacrimal Ducts: Chalazion RLL near medial canthus OD. Flat OS  Sclera/Conjunctiva: White and quiet OU.  Cornea: Clear OU.  Anterior Chamber: Deep and formed OU.    Iris: Flat OU.  Lens: Clear OU.    Fundus Exam: dilated with 1% tropicamide and 2.5% phenylephrine 4:52pm  Approval obtained from primary team for dilation  Patient aware that pupils can remained dilated for at least 4-6 hours.  Exam performed with 20 D lens    __________    Labs/Imaging:  CT Max with IV contrast: Left orbital preseptal cellulitis with no post septal component. No drainable fluid collection or abscess. Very large retention cysts versus polyps in the bilateral maxillary sinuses are present, left greater than right.     Assessment and Recommendations:  45y female with a past medical history/ocular history of partial hysterectomy consulted for localized upper eyelid induration    1. Localized upper eyelid induration c/w small focal area of cellulitis/resolving abscess  -Pt likely had ingrown hair which triggered cyst/abscess, which drained on its own. Pt now with 1 cm localized, erythematous indurated protrusion with scabbed over prior drainage site, tender to palpation; trace inferior periorbital edema L side.  -Area is indurated to palpation with no fluctuance. Does not appear to benefit from drainage.   -Recommend warm compresses 15 minutes qid to area.    2. Chalazion LLL  -Pt with 2 years of chalazion LLL.   -Pt to follow-up with oculoplastics to determine if lesion needs to be biopsied.     D/w Dr. Yanes, chief resident    Outpatient Follow-up: Patient should follow-up with his/her ophthalmologist or with [facility] Department of Ophthalmology within 1 week of after discharge at:    [address]  [place] [address]  [phone]    Jaun Easton MD, PGY-3  Pager: [phone]  Also available on Microsoft Teams       [facility] DEPARTMENT OF OPHTHALMOLOGY - INITIAL ADULT CONSULT  -----------------------------------------------------------------------------------------------------------------  Jaun Easton MD PGY 3  Pager: [phone]  -----------------------------------------------------------------------------------------------------------------    HPI: 44 y/o F with PMH partial hysterectomy, LASIK surgery OU (10 years ago) presents with localized L superior periorbital edema for 6 days. Pt tweezed eyebrows 8 days ago and a few days afterwards, she noticed a "bump" superior to lateral canthus L eye with a black spot in the middle. Pt attempted to remove the black spot with a tweezer but does not believe she was successful. Pt squeezed the bump and blood would drain. Surrounding the protrusion, pt noticed edema . Pt also stated L vision was blurry, though that resolved. Pt went to ED on 9/30 and was Dx with preseptal cellulitis and Rx Augmentin PO and Clinda PO (ophtho did not evaluate). Pt came into ED due to worsening pain around the protrusion when area is palpated. Pt reports having bump on RLL for 2 years.    Past Medical History: Partial hysterectomy   Past Ocular History: LASIK surgery OU (10 years ago)  Drops: None  Allergies: No known allergies to medications   Family History: No known family history of eye diseases.   Outpatient Ophthalmologist: None      Review of Systems:  Constitutional: No fever, chills  Eyes: No blurry vision, flashes, floaters, FBS, erythema, discharge, double vision, OU  Neuro: No tremors  Cardiovascular: No chest pain, palpitations  Respiratory: No SOB, no cough  GI: No nausea, vomiting, abdominal pain    Vital Signs: T(C): 36.7 (10-03-21 @ 12:05)  T(F): 98 (10-03-21 @ 12:05), Max: 98 (10-03-21 @ 12:05)  HR: 79 (10-03-21 @ 14:24) (79 - 90)  BP: 139/86 (10-03-21 @ 14:24) (139/86 - 139/93)  RR:  (18 - 18)  SpO2:  (100% - 100%)  Wt(kg): --    Ophthalmology Exam:  Visual acuity (sc): 20/20 OU.  Pupils: PERRL OU, no APD  Intraocular Pressure: 11, 10   Extraocular movements (EOMs): Full OU, no pain, no diplopia.  Confrontational Visual Field (CVF): Full OU.  Color Plates: 12/12 OU.    Slit Lamp Exam  External: Normal OD. 1 cm localized, erythematous indurated protrusion with scabbed over prior drainage site, tender to palpation; trace inferior periorbital edema OS  Lids/Lashes/Lacrimal Ducts: Chalazion RLL near medial canthus OD. Flat OS  Sclera/Conjunctiva: White and quiet OU.  Cornea: Clear OU.  Anterior Chamber: Deep and formed OU.    Iris: Flat OU.  Lens: Clear OU.    Fundus Exam: dilated with 1% tropicamide and 2.5% phenylephrine 4:52pm  Approval obtained from primary team for dilation  Patient aware that pupils can remained dilated for at least 4-6 hours.  Exam performed with 20 D lens    Vitreous: within normal limits OU  Disc, cup/disc: sharp and pink, 0.4 OU  Macula: within normal limits OU  Vessels: within normal limits OU    Labs/Imaging:  CT Max with IV contrast: Left orbital preseptal cellulitis with no post septal component. No drainable fluid collection or abscess. Very large retention cysts versus polyps in the bilateral maxillary sinuses are present, left greater than right.   A1c 13    Assessment and Recommendations:  45y female with a past medical history/ocular history of partial hysterectomy consulted for localized upper eyelid induration    1. Localized upper eyelid induration c/w small focal area of cellulitis/resolving abscess  -Pt likely had ingrown hair which triggered cyst/abscess, which drained on its own. Pt now with 1 cm localized, erythematous indurated protrusion with scabbed over prior drainage site, tender to palpation; trace inferior periorbital edema L side.  -Area is indurated to palpation with no fluctuance. Does not appear to benefit from drainage.   -Recommend warm compresses 15 minutes qid to area.    2. Chalazion LLL  -Pt with 2 years of chalazion LLL.   -Pt to follow-up with oculoplastics to determine if lesion needs to be biopsied.     3. DM  -Pt with no previous Hx of DM presented with glucose of 355, A1c found to be 13.  -No diabetic retinopathy on dilated fundus exam today.  -Diabetes likely causing prolonged healing.  -Pt needs to be set up with PCP.    Discussed findings with primary team.  D/w Dr. Yanes, chief resident    Outpatient Follow-up: Patient should follow-up with [facility] Ophthalmology Department on Tuesday at 9am  [address] [place] [address], [address] [place] [address]     Jaun Easton MD, PGY-3  Pager: [phone]  Also available on Microsoft Teams       [facility] DEPARTMENT OF OPHTHALMOLOGY - INITIAL ADULT CONSULT  -----------------------------------------------------------------------------------------------------------------  Jaun Easton MD PGY 3  Pager: [phone]  -----------------------------------------------------------------------------------------------------------------    HPI: 46 y/o F with PMH partial hysterectomy, LASIK surgery OU (10 years ago) presents with localized L superior periorbital edema for 6 days. Pt tweezed eyebrows 8 days ago and a few days afterwards, she noticed a "bump" superior to lateral canthus L eye with a black spot in the middle. Pt attempted to remove the black spot with a tweezer but does not believe she was successful. Pt squeezed the bump and blood would drain. Surrounding the protrusion, pt noticed edema . Pt also stated L vision was blurry, though that resolved. Pt went to ED on 9/30 and was Dx with preseptal cellulitis and Rx Augmentin PO and Clinda PO (ophtho did not evaluate). Pt came into ED due to worsening pain around the protrusion when area is palpated. Pt reports having bump on RLL for 2 years.    Past Medical History: Partial hysterectomy   Past Ocular History: LASIK surgery OU (10 years ago)  Drops: None  Allergies: No known allergies to medications   Family History: No known family history of eye diseases.   Outpatient Ophthalmologist: None      Review of Systems:  Constitutional: No fever, chills  Eyes: No blurry vision, flashes, floaters, FBS, erythema, discharge, double vision, OU  Neuro: No tremors  Cardiovascular: No chest pain, palpitations  Respiratory: No SOB, no cough  GI: No nausea, vomiting, abdominal pain    Vital Signs: T(C): 36.7 (10-03-21 @ 12:05)  T(F): 98 (10-03-21 @ 12:05), Max: 98 (10-03-21 @ 12:05)  HR: 79 (10-03-21 @ 14:24) (79 - 90)  BP: 139/86 (10-03-21 @ 14:24) (139/86 - 139/93)  RR:  (18 - 18)  SpO2:  (100% - 100%)  Wt(kg): --    Ophthalmology Exam:  Visual acuity (sc): 20/20 OU.  Pupils: PERRL OU, no APD  Intraocular Pressure: 11, 10   Extraocular movements (EOMs): Full OU, no pain, no diplopia.  Confrontational Visual Field (CVF): Full OU.  Color Plates: 12/12 OU.    Slit Lamp Exam  External: Normal OD. 1 cm localized, erythematous indurated protrusion with scabbed over prior drainage site, tender to palpation; trace inferior periorbital edema OS  Lids/Lashes/Lacrimal Ducts: Chalazion RLL near medial canthus OD. Flat OS  Sclera/Conjunctiva: White and quiet OU.  Cornea: Clear OU.  Anterior Chamber: Deep and formed OU.    Iris: Flat OU.  Lens: Clear OU.    Fundus Exam: dilated with 1% tropicamide and 2.5% phenylephrine 4:52pm  Approval obtained from primary team for dilation  Patient aware that pupils can remained dilated for at least 4-6 hours.  Exam performed with 20 D lens    Vitreous: within normal limits OU  Disc, cup/disc: sharp and pink, 0.4 OU  Macula: within normal limits OU  Vessels: within normal limits OU    Labs/Imaging:  CT Max with IV contrast: Left orbital preseptal cellulitis with no post septal component. No drainable fluid collection or abscess. Very large retention cysts versus polyps in the bilateral maxillary sinuses are present, left greater than right.   A1c 13    Assessment and Recommendations:  45y female with a past medical history/ocular history of partial hysterectomy consulted for localized upper eyelid induration    1. Localized upper eyelid induration c/w small focal area of cellulitis/resolving abscess  -Pt likely had ingrown hair which triggered cyst/abscess, which drained on its own. Pt now with 1 cm localized, erythematous indurated protrusion with scabbed over prior drainage site, tender to palpation; trace inferior periorbital edema L side.  -Area is indurated to palpation with no fluctuance. Does not appear to benefit from drainage.   -Recommend warm compresses 15 minutes qid to area.    2. Chalazion LLL  -Pt with 2 years of chalazion LLL.   -Pt to follow-up with oculoplastics to determine if lesion needs to be biopsied.     3. DM  -Pt with no previous Hx of DM presented with glucose of 355, A1c found to be 13.  -No diabetic retinopathy on dilated fundus exam today.  -Diabetes likely causing prolonged healing.  -Pt needs to be set up with PCP.  -Pt admitted overnight with endocrine consult and insulin initiation.    Discussed findings with primary team.  D/w Dr. Yanes, chief resident    Outpatient Follow-up: Patient should follow-up with [facility] Ophthalmology Department on Tuesday at 9am  [address] [place] [address], [address] [place] [address]     Jaun Easton MD, PGY-3  Pager: [phone]  Also available on Microsoft Teams       [facility] DEPARTMENT OF OPHTHALMOLOGY - INITIAL ADULT CONSULT  -----------------------------------------------------------------------------------------------------------------  Jaun Easton MD PGY 3  Pager: [phone]  -----------------------------------------------------------------------------------------------------------------    HPI: 46 y/o F with PMH partial hysterectomy, LASIK surgery OU (10 years ago) presents with localized L superior periorbital edema for 6 days. Pt tweezed eyebrows 8 days ago and a few days afterwards, she noticed a "bump" superior to lateral canthus L eye with a black spot in the middle. Pt attempted to remove the black spot with a tweezer but does not believe she was successful. Pt squeezed the bump and blood would drain. Surrounding the protrusion, pt noticed edema . Pt also stated L vision was blurry, though that resolved. Pt went to ED on 9/30 and was Dx with preseptal cellulitis and Rx Augmentin PO and Clinda PO (ophtho did not evaluate). Pt came into ED due to worsening pain around the protrusion when area is palpated. Pt reports having bump on RLL for 2 years.    Past Medical History: Partial hysterectomy   Past Ocular History: LASIK surgery OU (10 years ago)  Drops: None  Allergies: No known allergies to medications   Family History: No known family history of eye diseases.   Outpatient Ophthalmologist: None      Review of Systems:  Constitutional: No fever, chills  Eyes: No blurry vision, flashes, floaters, FBS, erythema, discharge, double vision, OU  Neuro: No tremors  Cardiovascular: No chest pain, palpitations  Respiratory: No SOB, no cough  GI: No nausea, vomiting, abdominal pain    Vital Signs: T(C): 36.7 (10-03-21 @ 12:05)  T(F): 98 (10-03-21 @ 12:05), Max: 98 (10-03-21 @ 12:05)  HR: 79 (10-03-21 @ 14:24) (79 - 90)  BP: 139/86 (10-03-21 @ 14:24) (139/86 - 139/93)  RR:  (18 - 18)  SpO2:  (100% - 100%)  Wt(kg): --    Ophthalmology Exam:  Visual acuity (sc): 20/20 OU.  Pupils: PERRL OU, no APD  Intraocular Pressure: 11, 10   Extraocular movements (EOMs): Full OU, no pain, no diplopia.  Confrontational Visual Field (CVF): Full OU.  Color Plates: 12/12 OU.    Slit Lamp Exam  External: Normal OD. 1 cm localized, erythematous indurated protrusion with scabbed over prior drainage site, tender to palpation; trace inferior periorbital edema OS  Lids/Lashes/Lacrimal Ducts: Visible and palpable, well-defined subcutaneous nodule in right lower eyelid, near medial canthus OD. Flat OS  Sclera/Conjunctiva: White and quiet OU.  Cornea: Clear OU.  Anterior Chamber: Deep and formed OU.    Iris: Flat OU.  Lens: Clear OU.    Fundus Exam: dilated with 1% tropicamide and 2.5% phenylephrine 4:52pm  Approval obtained from primary team for dilation  Patient aware that pupils can remained dilated for at least 4-6 hours.  Exam performed with 20 D lens    Vitreous: within normal limits OU  Disc, cup/disc: sharp and pink, 0.4 OU  Macula: within normal limits OU  Vessels: within normal limits OU    Labs/Imaging:  CT Max with IV contrast: Left orbital preseptal cellulitis with no post septal component. No drainable fluid collection or abscess. Very large retention cysts versus polyps in the bilateral maxillary sinuses are present, left greater than right.   A1c 13    Assessment and Recommendations:  45y female with a past medical history/ocular history of partial hysterectomy consulted for localized upper eyelid induration    1. Localized upper eyelid induration c/w small focal area of cellulitis/resolving abscess  -Pt likely had ingrown hair which triggered cyst/abscess, which drained on its own. Pt now with 1 cm localized, erythematous indurated protrusion with scabbed over prior drainage site, tender to palpation; trace inferior periorbital edema L side.  -Area is indurated to palpation with no fluctuance. Does not appear to benefit from drainage.   -Recommend warm compresses 15 minutes qid to area.    2. Chalazion LLL  -Pt with 2 years of chalazion LLL.   -Pt to follow-up with oculoplastics to determine if lesion needs to be biopsied.     3. DM  -Pt with no previous Hx of DM presented with glucose of 355, A1c found to be 13.  -No diabetic retinopathy on dilated fundus exam today.  -Diabetes likely causing prolonged healing.  -Pt needs to be set up with PCP.  -Pt admitted overnight with endocrine consult and insulin initiation.    Discussed findings with primary team.  D/w Dr. Yanes, chief resident    Outpatient Follow-up: Patient should follow-up with [facility] Ophthalmology Department on Tuesday at 9am  [address] [facility], 4th [place] [address]     Jaun Easton MD, PGY-3  Pager: [phone]  Also available on Microsoft Teams       [facility] DEPARTMENT OF OPHTHALMOLOGY - INITIAL ADULT CONSULT  -----------------------------------------------------------------------------------------------------------------  Jaun Easton MD PGY 3  Pager: [phone]  -----------------------------------------------------------------------------------------------------------------    HPI: 44 y/o F with PMH partial hysterectomy, LASIK surgery OU (10 years ago) presents with localized L superior periorbital edema for 6 days. Pt tweezed eyebrows 8 days ago and a few days afterwards, she noticed a "bump" superior to lateral canthus L eye with a black spot in the middle. Pt attempted to remove the black spot with a tweezer but does not believe she was successful. Pt squeezed the bump and blood would drain. Surrounding the protrusion, pt noticed edema . Pt also stated L vision was blurry, though that resolved. Pt went to ED on 9/30 and was Dx with preseptal cellulitis and Rx Augmentin PO and Clinda PO (ophtho did not evaluate). Pt came into ED due to worsening pain around the protrusion when area is palpated. Pt reports having bump on RLL for 2 years.    Past Medical History: Partial hysterectomy   Past Ocular History: LASIK surgery OU (10 years ago)  Drops: None  Allergies: No known allergies to medications   Family History: No known family history of eye diseases.   Outpatient Ophthalmologist: None      Review of Systems:  Constitutional: No fever, chills  Eyes: No blurry vision, flashes, floaters, FBS, erythema, discharge, double vision, OU  Neuro: No tremors  Cardiovascular: No chest pain, palpitations  Respiratory: No SOB, no cough  GI: No nausea, vomiting, abdominal pain    Vital Signs: T(C): 36.7 (10-03-21 @ 12:05)  T(F): 98 (10-03-21 @ 12:05), Max: 98 (10-03-21 @ 12:05)  HR: 79 (10-03-21 @ 14:24) (79 - 90)  BP: 139/86 (10-03-21 @ 14:24) (139/86 - 139/93)  RR:  (18 - 18)  SpO2:  (100% - 100%)  Wt(kg): --    Ophthalmology Exam:  Visual acuity (sc): 20/20 OU.  Pupils: PERRL OU, no APD  Intraocular Pressure: 11, 10   Extraocular movements (EOMs): Full OU, no pain, no diplopia.  Confrontational Visual Field (CVF): Full OU.  Color Plates: 12/12 OU.    Slit Lamp Exam  External: Normal OD. 1 cm localized, erythematous indurated protrusion with scabbed over prior drainage site, tender to palpation; trace inferior periorbital edema OS  Lids/Lashes/Lacrimal Ducts: Visible and palpable, well-defined subcutaneous nodule in RLL, near medial canthus OD. Flat OS  Sclera/Conjunctiva: White and quiet OU.  Cornea: Clear OU.  Anterior Chamber: Deep and formed OU.    Iris: Flat OU.  Lens: Clear OU.    Fundus Exam: dilated with 1% tropicamide and 2.5% phenylephrine 4:52pm  Approval obtained from primary team for dilation  Patient aware that pupils can remained dilated for at least 4-6 hours.  Exam performed with 20 D lens    Vitreous: within normal limits OU  Disc, cup/disc: sharp and pink, 0.4 OU  Macula: within normal limits OU  Vessels: within normal limits OU    Labs/Imaging:  CT Max with IV contrast: Left orbital preseptal cellulitis with no post septal component. No drainable fluid collection or abscess. Very large retention cysts versus polyps in the bilateral maxillary sinuses are present, left greater than right.   A1c 13    Assessment and Recommendations:  45y female with a past medical history/ocular history of partial hysterectomy consulted for localized upper eyelid induration    1. Localized upper eyelid induration c/w small focal area of cellulitis/resolving abscess  -Pt likely had ingrown hair which triggered cyst/abscess, which drained on its own. Pt now with 1 cm localized, erythematous indurated protrusion with scabbed over prior drainage site, tender to palpation; trace inferior periorbital edema L side.  -Area is indurated to palpation with no fluctuance. Does not appear to benefit from drainage.   -Recommend warm compresses 15 minutes qid to area.  -Continue course of PO antibiotics.    2. Subcutaneous nodule right lower lid  -Pt with 2 years of subcutaneous nodule right lower lid. On exam, pt with visible and palpable, well-defined subcutaneous nodule in RLL, near medial canthus   -Pt to follow-up with oculoplastics to determine if lesion needs to be biopsied.     3. Diabetes Mellitus  -Pt with no previous Hx of DM presented with glucose of 355, A1c found to have 13.  -No diabetic retinopathy on dilated fundus exam today. Pt will need annual dilated fundus exam.  -Diabetes likely causing prolonged healing.  -Pt needs to be set up with PCP.  -Pt admitted overnight with endocrine consult and insulin initiation.    Discussed findings with primary team.  D/w Dr. Yanes, chief resident    Outpatient Follow-up: Patient should follow-up with [facility] Ophthalmology Department on Tuesday at 9am  [address] [place] [address], [address] [place] [address]     Jaun Easton MD, PGY-3  Pager: [phone]  Also available on Microsoft Teams

## 2021-10-03 NOTE — ED CDU PROVIDER INITIAL DAY NOTE - ATTENDING CONTRIBUTION TO CARE
CDU MD CALERO:  I performed a face to face bedside interview with patient regarding history of present illness, review of symptoms and past medical history. I completed an independent physical exam.  I have discussed patient's plan of care with PA.   I agree with note as stated above, having amended the EMR as needed to reflect my findings. I have discussed the assessment and plan of care.  This includes during the time I functioned as the attending physician for this patient.    HPI / ROS / PE / MDM written by myself.

## 2021-10-03 NOTE — ED ADULT TRIAGE NOTE - CHIEF COMPLAINT QUOTE
pt seen here for eye infection. treated with antibiotics/ pt returns for worsen eye swelling and infection around the eye

## 2021-10-03 NOTE — ED PROVIDER NOTE - PROGRESS NOTE DETAILS
SELIN PLASCENCIA: Pt accepted to CDU for endocrinology consult.  Endocrinology consulted at this time.  Ophthalmology recommends warm compresses and continuing antibiotics.

## 2021-10-03 NOTE — ED PROVIDER NOTE - CLINICAL SUMMARY MEDICAL DECISION MAKING FREE TEXT BOX
Pt is a 46 y/o F PMHx partial hysterectomy p/w left eyelid pain/swelling x 6 days. -- likely preseptal cellulitis, possible developing abscess -- ct maxillofacial, labs

## 2021-10-03 NOTE — CHART NOTE - NSCHARTNOTEFT_GEN_A_CORE
46 y/o F admitted for preseptal cellulitis, found to have A1c of 12.7%. Labs wnl  New onset DM. Will start weight based dosing (0.4 u/kg weight)    Recs:   -Start Lantus 16 units at bedtime  -Start Ademlog 5 units qAC with each meal. Would wait till FS < 300 to start diet  -Start low dose correctional scale qAC and qHS  -Consistent carb diet    Official consult to follow in AM    Discussed with primary team     Ida Alvarenga MD  Endocrine Fellow  Pager: [phone] [phone]  Consults 9am-5pm: [phone]  After 5pm and weekends: [phone]

## 2021-10-03 NOTE — ED PROVIDER NOTE - CARE PLAN
1 Principal Discharge DX:	Preseptal cellulitis of left upper eyelid  Secondary Diagnosis:	Hyperglycemia

## 2021-10-03 NOTE — ED PROVIDER NOTE - EYE LID, LEFT
+focal area of induration and redness at left upper lateral eyelid; +tenderness; no crepitus; no active drainage

## 2021-10-03 NOTE — ED ADULT NURSE REASSESSMENT NOTE - NS ED NURSE REASSESS COMMENT FT1
Patient sitting up awake and alert, c/o pus from a spot above her left eye brow. IV placed, labs sent, awaiting a ct scan.
Report given to Greer FRANK CDU. Patient A&O x 4 at time of transfer.

## 2021-10-03 NOTE — ED CDU PROVIDER INITIAL DAY NOTE - PHYSICAL EXAMINATION
L eyebrow with + erythema, no fluctuance. Uvula is midline and rises symmetrically with phonation. No abscess or stridor. Pt is tolerating her secretions.

## 2021-10-03 NOTE — ED CDU PROVIDER INITIAL DAY NOTE - OBJECTIVE STATEMENT
Pt is a 46 y/o F PMHx partial hysterectomy p/w left eyelid pain/swelling x 6 days.  Pt reports developing gradual onset pain, swelling, redness at left upper eyelid.  She reports onset after plucking an eyebrow hair.  Pt was see in ED two days ago and was prescribed augmentin and clindamycin with which pt has been compliant. Pt reports this morning she developed slightly worse periorbital swelling, which has improved gradually over time today.  Pt reports overall pain improved.  Pt denies any fevers, chills, vision loss, pain with eye movement, double vision, eye discharge, trauma to eye, use of contact lenses, pain of the eye itself, or any other specific complaints.    As above.  Seen by ophtho in ED, recommend abx, no role for i&d at this time, no orbital involvement; preseptal cellulitis.  CDU for iv abx and endo eval for hyperglycemia. Pt is a 46 y/o F PMHx partial hysterectomy p/w left eyelid pain/swelling x 6 days.  Pt reports developing gradual onset pain, swelling, redness at left upper eyelid.  She reports onset after plucking an eyebrow hair.  Pt was see in ED two days ago and was prescribed augmentin and clindamycin with which pt has been compliant. Pt reports this morning she developed slightly worse periorbital swelling, which has improved gradually over time today.  Pt reports overall pain improved.  Pt denies any fevers, chills, vision loss, pain with eye movement, double vision, eye discharge, trauma to eye, use of contact lenses, pain of the eye itself, or any other specific complaints.    As above.  Seen by ophtho in ED, recommend abx, no role for i&d at this time, no orbital involvement; preseptal cellulitis.  CDU for iv abx and endo eval for hyperglycemia. Pt agreeable to plan, no systemic symptoms.

## 2021-10-03 NOTE — ED PROVIDER NOTE - ATTENDING CONTRIBUTION TO CARE
DR. CALERO, ATTENDING MD-  I performed a face to face bedside interview with the patient regarding history of present illness, review of symptoms and past medical history. I completed an independent physical exam.  I have discussed the patient's plan of care with the PA.   Documentation as above in the note.    46 y/o female left eye infection.  Preseptbal cellulitis, found to have hyperglycemia, no h/o dm.  Seen by ophtho.  CDU for iv abx, endo consult.

## 2021-10-04 VITALS
OXYGEN SATURATION: 98 % | RESPIRATION RATE: 18 BRPM | HEART RATE: 89 BPM | DIASTOLIC BLOOD PRESSURE: 76 MMHG | SYSTOLIC BLOOD PRESSURE: 126 MMHG | TEMPERATURE: 98 F

## 2021-10-04 DIAGNOSIS — E11.65 TYPE 2 DIABETES MELLITUS WITH HYPERGLYCEMIA: ICD-10-CM

## 2021-10-04 DIAGNOSIS — E11.9 TYPE 2 DIABETES MELLITUS WITHOUT COMPLICATIONS: ICD-10-CM

## 2021-10-04 DIAGNOSIS — R79.89 OTHER SPECIFIED ABNORMAL FINDINGS OF BLOOD CHEMISTRY: ICD-10-CM

## 2021-10-04 LAB
ALBUMIN SERPL ELPH-MCNC: 3.7 G/DL — SIGNIFICANT CHANGE UP (ref 3.3–5)
ALP SERPL-CCNC: 99 U/L — SIGNIFICANT CHANGE UP (ref 40–120)
ALT FLD-CCNC: 211 U/L — HIGH (ref 4–33)
ANION GAP SERPL CALC-SCNC: 9 MMOL/L — SIGNIFICANT CHANGE UP (ref 7–14)
AST SERPL-CCNC: 168 U/L — HIGH (ref 4–32)
BASE EXCESS BLDV CALC-SCNC: 2 MMOL/L — SIGNIFICANT CHANGE UP (ref -2–3)
BASOPHILS # BLD AUTO: 0.04 K/UL — SIGNIFICANT CHANGE UP (ref 0–0.2)
BASOPHILS NFR BLD AUTO: 0.5 % — SIGNIFICANT CHANGE UP (ref 0–2)
BILIRUB SERPL-MCNC: 0.7 MG/DL — SIGNIFICANT CHANGE UP (ref 0.2–1.2)
BLOOD GAS VENOUS COMPREHENSIVE RESULT: SIGNIFICANT CHANGE UP
BUN SERPL-MCNC: 8 MG/DL — SIGNIFICANT CHANGE UP (ref 7–23)
CALCIUM SERPL-MCNC: 8.6 MG/DL — SIGNIFICANT CHANGE UP (ref 8.4–10.5)
CHLORIDE BLDV-SCNC: 102 MMOL/L — SIGNIFICANT CHANGE UP (ref 96–108)
CHLORIDE SERPL-SCNC: 102 MMOL/L — SIGNIFICANT CHANGE UP (ref 98–107)
CO2 BLDV-SCNC: 29.2 MMOL/L — HIGH (ref 22–26)
CO2 SERPL-SCNC: 27 MMOL/L — SIGNIFICANT CHANGE UP (ref 22–31)
CREAT SERPL-MCNC: 0.37 MG/DL — LOW (ref 0.5–1.3)
EOSINOPHIL # BLD AUTO: 0.29 K/UL — SIGNIFICANT CHANGE UP (ref 0–0.5)
EOSINOPHIL NFR BLD AUTO: 3.8 % — SIGNIFICANT CHANGE UP (ref 0–6)
GAS PNL BLDV: 135 MMOL/L — LOW (ref 136–145)
GAS PNL BLDV: SIGNIFICANT CHANGE UP
GLUCOSE BLDV-MCNC: 281 MG/DL — HIGH (ref 70–99)
GLUCOSE SERPL-MCNC: 278 MG/DL — HIGH (ref 70–99)
HCO3 BLDV-SCNC: 28 MMOL/L — SIGNIFICANT CHANGE UP (ref 22–29)
HCT VFR BLD CALC: 38.2 % — SIGNIFICANT CHANGE UP (ref 34.5–45)
HCT VFR BLDA CALC: 40 % — SIGNIFICANT CHANGE UP (ref 34.5–46.5)
HGB BLD CALC-MCNC: 13.3 G/DL — SIGNIFICANT CHANGE UP (ref 11.5–15.5)
HGB BLD-MCNC: 13.3 G/DL — SIGNIFICANT CHANGE UP (ref 11.5–15.5)
IANC: 4.58 K/UL — SIGNIFICANT CHANGE UP (ref 1.5–8.5)
IMM GRANULOCYTES NFR BLD AUTO: 0.4 % — SIGNIFICANT CHANGE UP (ref 0–1.5)
LACTATE BLDV-MCNC: 1.1 MMOL/L — SIGNIFICANT CHANGE UP (ref 0.5–2)
LYMPHOCYTES # BLD AUTO: 2.02 K/UL — SIGNIFICANT CHANGE UP (ref 1–3.3)
LYMPHOCYTES # BLD AUTO: 26.3 % — SIGNIFICANT CHANGE UP (ref 13–44)
MCHC RBC-ENTMCNC: 31.1 PG — SIGNIFICANT CHANGE UP (ref 27–34)
MCHC RBC-ENTMCNC: 34.8 GM/DL — SIGNIFICANT CHANGE UP (ref 32–36)
MCV RBC AUTO: 89.5 FL — SIGNIFICANT CHANGE UP (ref 80–100)
MONOCYTES # BLD AUTO: 0.71 K/UL — SIGNIFICANT CHANGE UP (ref 0–0.9)
MONOCYTES NFR BLD AUTO: 9.3 % — SIGNIFICANT CHANGE UP (ref 2–14)
NEUTROPHILS # BLD AUTO: 4.58 K/UL — SIGNIFICANT CHANGE UP (ref 1.8–7.4)
NEUTROPHILS NFR BLD AUTO: 59.7 % — SIGNIFICANT CHANGE UP (ref 43–77)
NRBC # BLD: 0 /100 WBCS — SIGNIFICANT CHANGE UP
NRBC # FLD: 0 K/UL — SIGNIFICANT CHANGE UP
PCO2 BLDV: 47 MMHG — HIGH (ref 39–42)
PH BLDV: 7.38 — SIGNIFICANT CHANGE UP (ref 7.32–7.43)
PLATELET # BLD AUTO: 223 K/UL — SIGNIFICANT CHANGE UP (ref 150–400)
PO2 BLDV: 52 MMHG — SIGNIFICANT CHANGE UP
POTASSIUM BLDV-SCNC: 3.5 MMOL/L — SIGNIFICANT CHANGE UP (ref 3.5–5.1)
POTASSIUM SERPL-MCNC: 3.7 MMOL/L — SIGNIFICANT CHANGE UP (ref 3.5–5.3)
POTASSIUM SERPL-SCNC: 3.7 MMOL/L — SIGNIFICANT CHANGE UP (ref 3.5–5.3)
PROT SERPL-MCNC: 6.6 G/DL — SIGNIFICANT CHANGE UP (ref 6–8.3)
RBC # BLD: 4.27 M/UL — SIGNIFICANT CHANGE UP (ref 3.8–5.2)
RBC # FLD: 12.3 % — SIGNIFICANT CHANGE UP (ref 10.3–14.5)
SAO2 % BLDV: 89.4 % — SIGNIFICANT CHANGE UP
SODIUM SERPL-SCNC: 138 MMOL/L — SIGNIFICANT CHANGE UP (ref 135–145)
WBC # BLD: 7.67 K/UL — SIGNIFICANT CHANGE UP (ref 3.8–10.5)
WBC # FLD AUTO: 7.67 K/UL — SIGNIFICANT CHANGE UP (ref 3.8–10.5)

## 2021-10-04 PROCEDURE — 99285 EMERGENCY DEPT VISIT HI MDM: CPT

## 2021-10-04 PROCEDURE — 99217: CPT

## 2021-10-04 RX ORDER — ISOPROPYL ALCOHOL, BENZOCAINE .7; .06 ML/ML; ML/ML
1 SWAB TOPICAL
Qty: 100 | Refills: 1
Start: 2021-10-04 | End: 2021-11-22

## 2021-10-04 RX ORDER — ENOXAPARIN SODIUM 100 MG/ML
18 INJECTION SUBCUTANEOUS
Qty: 5 | Refills: 0
Start: 2021-10-04 | End: 2021-11-02

## 2021-10-04 RX ORDER — INSULIN LISPRO 100/ML
6 VIAL (ML) SUBCUTANEOUS
Qty: 5 | Refills: 0
Start: 2021-10-04 | End: 2021-11-02

## 2021-10-04 RX ORDER — INSULIN LISPRO 100/ML
VIAL (ML) SUBCUTANEOUS
Refills: 0 | Status: DISCONTINUED | OUTPATIENT
Start: 2021-10-04 | End: 2021-10-07

## 2021-10-04 RX ADMIN — Medication 100 MILLIGRAM(S): at 05:51

## 2021-10-04 RX ADMIN — Medication 5 UNIT(S): at 12:02

## 2021-10-04 RX ADMIN — Medication 5 UNIT(S): at 07:43

## 2021-10-04 RX ADMIN — Medication 900 MILLIGRAM(S): at 05:30

## 2021-10-04 RX ADMIN — Medication 2: at 07:43

## 2021-10-04 RX ADMIN — Medication 6: at 12:01

## 2021-10-04 NOTE — CONSULT NOTE ADULT - ATTENDING COMMENTS
New onset uncontrolled Type 2 DM with preseptal cellulitis. Found with HBA1c 12.7%.  Initiate basal and bolus insulin pens regimen for dc as described above.  Bedside teaching for glucometer and insulin pens.  Nutritional counselling.  outpatient follow up with PCP, endocrine including RD and doctor.  [facility] endocrinology [phone], will have office call patient to schedule.  Endocrine team consulted for uncontrolled diabetes. Patient is high risk with high level decision making due to uncontrolled diabetes which places patient at high risk for cardiovascular and cerebrovascular events. Patient with lability of glucose requiring close monitoring and insulin adjustments.    Dhiraj Ly MD  Division of Endocrinology  Pager: [phone]    If after 6PM or before 9AM, or on weekends/holidays, please call endocrine answering service for assistance ([phone]).  For nonurgent matters email [email or web address] for assistance.

## 2021-10-04 NOTE — ED CDU PROVIDER DISPOSITION NOTE - PATIENT PORTAL LINK FT
You can access the FollowMyHealth Patient Portal offered by [facility] by registering at the following website: [email or web address]. By joining [facility]’s FollowMyHealth portal, you will also be able to view your health information using other applications (apps) compatible with our system.

## 2021-10-04 NOTE — ED CDU PROVIDER DISPOSITION NOTE - CARE PROVIDERS DIRECT ADDRESSES
[email or web address]

## 2021-10-04 NOTE — ED CDU PROVIDER SUBSEQUENT DAY NOTE - HISTORY
46 y/o F PMHx partial hysterectomy p/w left eyelid pain/swelling x 6 days. Seen by ophtho in ED, recommend abx, no role for i&d at this time, no orbital involvement; preseptal cellulitis.  CDU for iv abx and endo eval for hyperglycemia. Endo placed recommendations in chart note, will officially consult this morning.   Pt with no acute complaints overnight. Pending Ophthalmology reassessment and official endo consult.

## 2021-10-04 NOTE — ED CDU PROVIDER DISPOSITION NOTE - CARE PROVIDER_API CALL
Dhiraj Pierce (MD)  EndocrinologyMetabDiabetes  [address] [place] [address]  Phone: [phone]  Fax: [phone]  Follow Up Time:

## 2021-10-04 NOTE — ED CDU PROVIDER DISPOSITION NOTE - CLINICAL COURSE
45yF w/pmhx partial hysterectomy p/w left eyelid pain/swelling x 6 days. Seen by ophtho in ED, recommend abx, no role for i&d at this time, had CT performed no orbital involvement; preseptal cellulitis.  CDU for iv abx and endo eval for hyperglycemia. Pt with significant improvement in symptoms this morning, cleared for d/c by optho with follow up tomorrow morning already scheduled. Endocrine recommending Lantus 18 units and Humalog 6 units for discharge with clinic follow up. Pt already has rx for clindamycin and augmentin. Discussed warm compress to area. Vitals stable, pt afebrile w/ significant clinical improvement. Pt d/c with pmd, optho, endo follow up. She will return with any worsening or concerning symptoms.

## 2021-10-04 NOTE — CONSULT NOTE ADULT - ASSESSMENT
45F PMH uterine bleeding s/p partial hysterectomy, cholelithiasis, presented with left eyelid pain/swelling x 6 days, found with left preseptal cellulitis, incidentally found with uncontrolled diabetes, likely T2DM a1c 12.7.    #Uncontrolled T2DM  - c/w lantus likely d/c with glargine pen 18U qHS  - c/w ademlog likely d/c with humalog kwikpen 6 U pre-meal  - have patient log FS daily, premeals and qHS  - patient will need insulin teaching prior to d/c   - patient should initiate metformin 500 BID on d/c  - will need to f/u at endocrine clinic and PCP  - counseling on diet/exercise/weight loss  - currently no retinopathy or neuropathy; will need outpatient ophthalmology f/u    #Elevated LFTs  - patient will need outpatient liver uS  - would hold on statin for now  - counseling on diet/exercise/weight loss and alcohol abstinence 45F PMH uterine bleeding s/p partial hysterectomy, cholelithiasis, presented with left eyelid pain/swelling x 6 days, found with left preseptal cellulitis, incidentally found with uncontrolled diabetes, likely T2DM a1c 12.7.    #Uncontrolled T2DM  - c/w lantus likely d/c with glargine pen 18U qHS  - c/w ademlog likely d/c with humalog kwikpen 6 U pre-meal  - have patient log FS daily, premeals and qHS  - patient will need insulin teaching prior to d/c   - patient should initiate metformin 500 BID on d/c  - will need to f/u at endocrine clinic and PCP  - counseling on diet/exercise/weight loss  - currently no retinopathy or neuropathy; will need outpatient ophthalmology f/u    #Elevated LFTs  - patient will need outpatient liver uS  - would hold on statin for now  - counseling on diet/exercise/weight loss and alcohol abstinence    ***PRELIMINARY RESIDENT NOTE PENDING ATTENDING ATTESTATION*** 45F PMH uterine bleeding s/p partial hysterectomy, cholelithiasis, presented with left eyelid pain/swelling x 6 days, found with left preseptal cellulitis, incidentally found with uncontrolled diabetes, likely T2DM a1c 12.7.    #Uncontrolled T2DM  INPATIENT PLANS:   - c/w lantus can increase to 18 qHS; c/w ademolog can increase to 6 TID AC  - nutrition c/s  - nursing education on self-administering insulin with pens and glucometer use/FS testing premeal and qHS  - change insulin correction scale to MODERATE scale and include BEDTIME MODERATE scale correction    DISCHARGE PLAN:   - d/c with insulin pens: glargine pen 18U qHS; humalog 6 U pre-meal. Use pen that is best covered by patient's insurance  - have patient log FS daily, premeals and qHS  - patient will need insulin teaching, nutrition evaluation, and glucometer/FS teaching prior to d/c  - no metformin or statin due to LFT elevations  - will need to f/u at endocrine clinic and PCP. lipid panel as outpatient  - counseling on diet/exercise/weight loss  - currently no retinopathy or neuropathy; will need outpatient ophthalmology f/u    #Elevated LFTs  - patient will need outpatient liver US  - would hold on statin for now, hold on metformin  - counseling on diet/exercise/weight loss and alcohol abstinence    Discussed with attending Dr. Kennedy

## 2021-10-04 NOTE — CONSULT NOTE ADULT - SUBJECTIVE AND OBJECTIVE BOX
HPI: 45F PMH uterine bleeding s/p partial hysterectomy, cholelithiasis, presented with left eyelid pain/swelling x 6 days.  Pt reports developing gradual onset pain, swelling, redness at left upper eyelid.  She reports onset after plucking an eyebrow hair.  Pt was see in ED two days ago and was prescribed augmentin and clindamycin with which pt has been compliant, but reports she noted worsening periorbital swelling (particularly left lower eyelid) prompting re-presentation to ED. She denied acute vision problems but does report chronic bluriness; previously received lasic eye surgery. Patient has been on IV clindamycin, seen by ophthalmology.     Interval history: Labwork showed , subsequent a1c 12.7, elevated LFTs (251  ALT). Recent FS: 249 (dinner), 367 (bedtime), 231 (AM). She was started on lantus 16 10/3 and has received 2 doses of admelog TID AC. She has received 2Us ademolog correction scale with dinner 10/3 and breakfast 10/4.    Endocrine history: Patient denies prior history of diabetes. Reports she last saw primary care <5yrs ago but not recently. Reports prior bloodwork was wnl. No medical history with the exception of uterine bleeding and cholelithiasis. She is not on any home medications. Her mother and grandmother both have diabetes. She does report chronic polyuria, polydypsia, and blurry vision (mostly for distant objects). She denies polyphagia. Her diet consists of light breakfast, often skips, sometimes eats oatmeal. Lunch consists of leftovers, typically rice or pasta dish or a bagel with butter. Dinner is often a rice or pasta dish. She says she eats mostly baked chicken or pork for protein. She eats some vegetables and salad and mostly eats pineapple and jose rafael for fruit. She does not keep snacks at home. She does not consume fried foods. She occasionally drinks fruit juice.    In the ED BPs 110s-130s/60s-80s. Other VS WNL.       PAST MEDICAL & SURGICAL HISTORY:  Uterine leiomyoma, unspecified location    S/P partial hysterectomy    S/P knee surgery        FAMILY HISTORY: DM in mother and grandmother      Social History: denies tobacco, or illicit drug use. Social occasional alcohol drinking. Works for [employer].     Outpatient Medications: None    MEDICATIONS  (STANDING):  clindamycin IVPB 900 milliGRAM(s) IV Intermittent every 8 hours  dextrose 40% Gel 15 Gram(s) Oral once  dextrose 5%. 1000 milliLiter(s) (50 mL/Hr) IV Continuous <Continuous>  dextrose 5%. 1000 milliLiter(s) (100 mL/Hr) IV Continuous <Continuous>  dextrose 50% Injectable 25 Gram(s) IV Push once  dextrose 50% Injectable 12.5 Gram(s) IV Push once  dextrose 50% Injectable 25 Gram(s) IV Push once  glucagon  Injectable 1 milliGRAM(s) IntraMuscular once  insulin glargine Injectable (LANTUS) 16 Unit(s) SubCutaneous at bedtime  insulin lispro (ADMELOG) corrective regimen sliding scale   SubCutaneous three times a day before meals  insulin lispro Injectable (ADMELOG) 5 Unit(s) SubCutaneous before breakfast  insulin lispro Injectable (ADMELOG) 5 Unit(s) SubCutaneous before lunch  insulin lispro Injectable (ADMELOG) 5 Unit(s) SubCutaneous before dinner  lactated ringers. 1000 milliLiter(s) (100 mL/Hr) IV Continuous <Continuous>    MEDICATIONS  (PRN):      Allergies    No Known Allergies    Intolerances      Review of Systems:  Constitutional: No fever  Eyes: No blurry vision  Neuro: No tremors  HEENT: No pain  Cardiovascular: No chest pain, palpitations  Respiratory: No SOB, no cough  GI: No nausea, vomiting, abdominal pain  : No dysuria  Skin: no rash  Psych: no depression  Endocrine: no polyuria, polydipsia  Hem/lymph: no swelling  Osteoporosis: no fractures    ALL OTHER SYSTEMS REVIEWED AND NEGATIVE    UNABLE TO OBTAIN    PHYSICAL EXAM:  VITALS: T(C): 36.9 (10-04-21 @ 10:00)  T(F): 98.5 (10-04-21 @ 10:00), Max: 98.5 (10-04-21 @ 10:00)  HR: 89 (10-04-21 @ 10:00) (66 - 93)  BP: 126/76 (10-04-21 @ 10:00) (115/69 - 158/85)  RR:  (16 - 18)  SpO2:  (98% - 100%)  Wt(kg): --  GENERAL: NAD, well-groomed, well-developed  EYES: No proptosis, no lid lag, anicteric  HEENT:  Atraumatic, Normocephalic, moist mucous membranes  THYROID: Normal size, no palpable nodules  RESPIRATORY: Clear to auscultation bilaterally; No rales, rhonchi, wheezing  CARDIOVASCULAR: Regular rate and rhythm; No murmurs; no peripheral edema  GI: Soft, nontender, non distended, normal bowel sounds  SKIN: Dry, intact, No rashes or lesions  MUSCULOSKELETAL: Full range of motion, normal strength  NEURO: sensation intact, extraocular movements intact, no tremor  PSYCH: Alert and oriented x 3, normal affect, normal mood  CUSHING'S SIGNS: no striae      CAPILLARY BLOOD GLUCOSE      POCT Blood Glucose.: 231 mg/dL (04 Oct 2021 07:36)  POCT Blood Glucose.: 367 mg/dL (03 Oct 2021 21:44)  POCT Blood Glucose.: 249 mg/dL (03 Oct 2021 20:16)                            13.3   7.67  )-----------( 223      ( 04 Oct 2021 07:59 )             38.2       10-04    138  |  102  |  8   ----------------------------<  278<H>  3.7   |  27  |  0.37<L>    EGFR if : 150  EGFR if non : 129    Ca    8.6      10-04    TPro  6.6  /  Alb  3.7  /  TBili  0.7  /  DBili  x   /  AST  168<H>  /  ALT  211<H>  /  AlkPhos  99  10-04      Thyroid Function Tests:      A1C with Estimated Average Glucose Result: 12.7 % (10-03-21 @ 16:11)          Radiology:     < from: CT Maxillofacial w/ IV Cont (10.03.21 @ 14:39) >  Findings:    There is an enhancement and thickening of theleft preseptal tissues, compatible with the left preseptal cellulitis. There is no post septal component. No drainable fluid collection or abscess is present. The left orbital retrobulbar fat is clean. The left globe, extraocular muscles and optic nerve appear unremarkable.    Right globe, right extraocular muscles, and right optic nerve appear unremarkable. The right orbital retrobulbar fat is clean.    Very large retention cysts versus polyps in the bilateral maxillary sinuses are present, leftgreater than right.    Visualized portions of the brain are unremarkable.    IMPRESSION:    Left orbital preseptal cellulitis with no post septal component. No drainable fluid collection or abscess.    Very large retention cysts versus polyps in the bilateral maxillary sinuses are present, left greater than right.    < end of copied text >

## 2021-10-13 ENCOUNTER — APPOINTMENT (OUTPATIENT)
Dept: INTERNAL MEDICINE | Facility: CLINIC | Age: 46
End: 2021-10-13
Payer: COMMERCIAL

## 2021-10-13 VITALS
DIASTOLIC BLOOD PRESSURE: 80 MMHG | BODY MASS INDEX: 28.32 KG/M2 | WEIGHT: 170 LBS | OXYGEN SATURATION: 98 % | TEMPERATURE: 97.5 F | SYSTOLIC BLOOD PRESSURE: 121 MMHG | HEIGHT: 64.8 IN | HEART RATE: 74 BPM

## 2021-10-13 DIAGNOSIS — Z83.3 FAMILY HISTORY OF DIABETES MELLITUS: ICD-10-CM

## 2021-10-13 DIAGNOSIS — Z00.00 ENCOUNTER FOR GENERAL ADULT MEDICAL EXAMINATION W/OUT ABNORMAL FINDINGS: ICD-10-CM

## 2021-10-13 DIAGNOSIS — H91.90 UNSPECIFIED HEARING LOSS, UNSPECIFIED EAR: ICD-10-CM

## 2021-10-13 DIAGNOSIS — H53.8 OTHER VISUAL DISTURBANCES: ICD-10-CM

## 2021-10-13 DIAGNOSIS — H44.009 UNSPECIFIED PURULENT ENDOPHTHALMITIS, UNSPECIFIED EYE: ICD-10-CM

## 2021-10-13 DIAGNOSIS — Z82.49 FAMILY HISTORY OF ISCHEMIC HEART DISEASE AND OTHER DISEASES OF THE CIRCULATORY SYSTEM: ICD-10-CM

## 2021-10-13 PROCEDURE — 93000 ELECTROCARDIOGRAM COMPLETE: CPT

## 2021-10-13 PROCEDURE — 36415 COLL VENOUS BLD VENIPUNCTURE: CPT

## 2021-10-13 PROCEDURE — 99396 PREV VISIT EST AGE 40-64: CPT | Mod: 25

## 2021-10-13 NOTE — HISTORY OF PRESENT ILLNESS
[FreeTextEntry1] : Presents for CPE and FBW. [de-identified] : New pt reports\par - being in ER last week for eye infection.On antibiotic. \par Pt did not bring medications with her\par \par - was diagnosed with DM.On Insulin.\par -hard of hearing\par Feeling better but c/o vision disturbance taking new medications.

## 2021-10-13 NOTE — HEALTH RISK ASSESSMENT
[Very Good] : ~his/her~  mood as very good [Yes] : Yes [No] : In the past 12 months have you used drugs other than those required for medical reasons? No [No falls in past year] : Patient reported no falls in the past year [0] : 2) Feeling down, depressed, or hopeless: Not at all (0) [Patient reported mammogram was normal] : Patient reported mammogram was normal [Employed] : employed [Reports changes in hearing] : Reports changes in hearing [Reports changes in vision] : Reports changes in vision [Smoke Detector] : smoke detector [Carbon Monoxide Detector] : carbon monoxide detector [Seat Belt] :  uses seat belt [Sunscreen] : uses sunscreen [] : No [de-identified] : occ [de-identified] : none [de-identified] : now low sugar [[id number]] : 0 [Reports changes in dental health] : Reports no changes in dental health [TB Exposure] : is not being exposed to tuberculosis [MammogramDate] : 12/20 [PapSmearDate] : 12/20

## 2021-10-13 NOTE — PHYSICAL EXAM
[No Acute Distress] : no acute distress [Well Nourished] : well nourished [Well Developed] : well developed [Well-Appearing] : well-appearing [Normal Sclera/Conjunctiva] : normal sclera/conjunctiva [PERRL] : pupils equal round and reactive to light [EOMI] : extraocular movements intact [Normal Outer Ear/Nose] : the outer ears and nose were normal in appearance [Normal Oropharynx] : the oropharynx was normal [No JVD] : no jugular venous distention [No Lymphadenopathy] : no lymphadenopathy [Supple] : supple [Thyroid Normal, No Nodules] : the thyroid was normal and there were no nodules present [No Respiratory Distress] : no respiratory distress  [No Accessory Muscle Use] : no accessory muscle use [Clear to Auscultation] : lungs were clear to auscultation bilaterally [Normal Rate] : normal rate  [Regular Rhythm] : with a regular rhythm [Normal S1, S2] : normal S1 and S2 [No Murmur] : no murmur heard [No Carotid Bruits] : no carotid bruits [No Abdominal Bruit] : a ~M bruit was not heard ~T in the abdomen [No Varicosities] : no varicosities [Pedal Pulses Present] : the pedal pulses are present [No Edema] : there was no peripheral edema [No Palpable Aorta] : no palpable aorta [No Extremity Clubbing/Cyanosis] : no extremity clubbing/cyanosis [Normal Appearance] : normal in appearance [No Nipple Discharge] : no nipple discharge [No Axillary Lymphadenopathy] : no axillary lymphadenopathy [Soft] : abdomen soft [Non Tender] : non-tender [Non-distended] : non-distended [No Masses] : no abdominal mass palpated [No HSM] : no HSM [Normal Bowel Sounds] : normal bowel sounds [Normal Posterior Cervical Nodes] : no posterior cervical lymphadenopathy [Normal Anterior Cervical Nodes] : no anterior cervical lymphadenopathy [No CVA Tenderness] : no CVA  tenderness [No Spinal Tenderness] : no spinal tenderness [No Joint Swelling] : no joint swelling [Grossly Normal Strength/Tone] : grossly normal strength/tone [No Rash] : no rash [Coordination Grossly Intact] : coordination grossly intact [No Focal Deficits] : no focal deficits [Normal Gait] : normal gait [Normal Affect] : the affect was normal [Deep Tendon Reflexes (DTR)] : deep tendon reflexes were 2+ and symmetric [Normal Insight/Judgement] : insight and judgment were intact [Comprehensive Foot Exam Normal] : Right and left foot were examined and both feet are normal. No ulcers in either foot. Toes are normal and with full ROM.  Normal tactile sensation with monofilament testing throughout both feet

## 2021-10-19 ENCOUNTER — NON-APPOINTMENT (OUTPATIENT)
Age: 46
End: 2021-10-19

## 2021-10-19 LAB
ALBUMIN SERPL ELPH-MCNC: 4.1 G/DL
ALP BLD-CCNC: 82 U/L
ALT SERPL-CCNC: 215 U/L
ANION GAP SERPL CALC-SCNC: 11 MMOL/L
APPEARANCE: CLEAR
AST SERPL-CCNC: 144 U/L
BASOPHILS # BLD AUTO: 0.05 K/UL
BASOPHILS NFR BLD AUTO: 0.9 %
BILIRUB SERPL-MCNC: 0.6 MG/DL
BILIRUBIN URINE: NEGATIVE
BLOOD URINE: NEGATIVE
BUN SERPL-MCNC: 7 MG/DL
CALCIUM SERPL-MCNC: 9.5 MG/DL
CHLORIDE SERPL-SCNC: 102 MMOL/L
CHOLEST SERPL-MCNC: 172 MG/DL
CO2 SERPL-SCNC: 26 MMOL/L
COLOR: YELLOW
CREAT SERPL-MCNC: 0.47 MG/DL
EOSINOPHIL # BLD AUTO: 0.23 K/UL
EOSINOPHIL NFR BLD AUTO: 4 %
ESTIMATED AVERAGE GLUCOSE: 289 MG/DL
GLUCOSE QUALITATIVE U: NEGATIVE
GLUCOSE SERPL-MCNC: 151 MG/DL
HBA1C MFR BLD HPLC: 11.7 %
HCT VFR BLD CALC: 40.6 %
HDLC SERPL-MCNC: 50 MG/DL
HGB BLD-MCNC: 13.5 G/DL
IMM GRANULOCYTES NFR BLD AUTO: 0.2 %
KETONES URINE: NEGATIVE
LDLC SERPL CALC-MCNC: 111 MG/DL
LEUKOCYTE ESTERASE URINE: NEGATIVE
LYMPHOCYTES # BLD AUTO: 1.65 K/UL
LYMPHOCYTES NFR BLD AUTO: 28.8 %
MAN DIFF?: NORMAL
MCHC RBC-ENTMCNC: 31.2 PG
MCHC RBC-ENTMCNC: 33.3 GM/DL
MCV RBC AUTO: 93.8 FL
MONOCYTES # BLD AUTO: 0.57 K/UL
MONOCYTES NFR BLD AUTO: 10 %
NEUTROPHILS # BLD AUTO: 3.21 K/UL
NEUTROPHILS NFR BLD AUTO: 56.1 %
NITRITE URINE: NEGATIVE
NONHDLC SERPL-MCNC: 122 MG/DL
PH URINE: 6.5
PLATELET # BLD AUTO: 258 K/UL
POTASSIUM SERPL-SCNC: 4 MMOL/L
PROT SERPL-MCNC: 7.3 G/DL
PROTEIN URINE: NEGATIVE
RBC # BLD: 4.33 M/UL
RBC # FLD: 13.1 %
SODIUM SERPL-SCNC: 139 MMOL/L
SPECIFIC GRAVITY URINE: 1.01
T3 SERPL-MCNC: 108 NG/DL
T4 SERPL-MCNC: 7.8 UG/DL
TRIGL SERPL-MCNC: 53 MG/DL
TSH SERPL-ACNC: 2.04 UIU/ML
UROBILINOGEN URINE: NORMAL
WBC # FLD AUTO: 5.72 K/UL

## 2021-10-20 ENCOUNTER — APPOINTMENT (OUTPATIENT)
Dept: ENDOCRINOLOGY | Facility: CLINIC | Age: 46
End: 2021-10-20
Payer: COMMERCIAL

## 2021-10-20 VITALS — HEIGHT: 65 IN | WEIGHT: 172.4 LBS | BODY MASS INDEX: 28.72 KG/M2

## 2021-10-20 PROCEDURE — G0108 DIAB MANAGE TRN  PER INDIV: CPT

## 2021-11-13 ENCOUNTER — APPOINTMENT (OUTPATIENT)
Dept: INTERNAL MEDICINE | Facility: CLINIC | Age: 46
End: 2021-11-13
Payer: COMMERCIAL

## 2021-11-13 VITALS
WEIGHT: 168 LBS | DIASTOLIC BLOOD PRESSURE: 70 MMHG | BODY MASS INDEX: 27 KG/M2 | HEART RATE: 79 BPM | HEIGHT: 66 IN | TEMPERATURE: 97.9 F | OXYGEN SATURATION: 98 % | SYSTOLIC BLOOD PRESSURE: 120 MMHG

## 2021-11-13 PROCEDURE — 99212 OFFICE O/P EST SF 10 MIN: CPT | Mod: 25

## 2021-11-13 PROCEDURE — 36415 COLL VENOUS BLD VENIPUNCTURE: CPT

## 2021-11-13 NOTE — HISTORY OF PRESENT ILLNESS
[FreeTextEntry8] : Pt presents for fill out the form.\par Pt was seen by [facility] nutritionist.\par On Insulin Novolog and Lantus. \par Pt reports has low sugar at 70 at times.

## 2021-11-20 ENCOUNTER — NON-APPOINTMENT (OUTPATIENT)
Age: 46
End: 2021-11-20

## 2021-11-20 LAB
ALBUMIN SERPL ELPH-MCNC: 4.3 G/DL
ALP BLD-CCNC: 63 U/L
ALT SERPL-CCNC: 41 U/L
ANION GAP SERPL CALC-SCNC: 10 MMOL/L
AST SERPL-CCNC: 33 U/L
BILIRUB SERPL-MCNC: 0.7 MG/DL
BUN SERPL-MCNC: 12 MG/DL
CALCIUM SERPL-MCNC: 9.9 MG/DL
CHLORIDE SERPL-SCNC: 105 MMOL/L
CO2 SERPL-SCNC: 27 MMOL/L
CREAT SERPL-MCNC: 0.46 MG/DL
ESTIMATED AVERAGE GLUCOSE: 177 MG/DL
GLUCOSE SERPL-MCNC: 55 MG/DL
HBA1C MFR BLD HPLC: 7.8 %
POTASSIUM SERPL-SCNC: 4 MMOL/L
PROT SERPL-MCNC: 7.7 G/DL
SODIUM SERPL-SCNC: 142 MMOL/L

## 2021-12-15 ENCOUNTER — APPOINTMENT (OUTPATIENT)
Dept: ENDOCRINOLOGY | Facility: CLINIC | Age: 46
End: 2021-12-15

## 2021-12-15 ENCOUNTER — NON-APPOINTMENT (OUTPATIENT)
Age: 46
End: 2021-12-15

## 2021-12-15 ENCOUNTER — APPOINTMENT (OUTPATIENT)
Dept: ENDOCRINOLOGY | Facility: CLINIC | Age: 46
End: 2021-12-15
Payer: COMMERCIAL

## 2021-12-15 VITALS
OXYGEN SATURATION: 98 % | SYSTOLIC BLOOD PRESSURE: 155 MMHG | BODY MASS INDEX: 29.11 KG/M2 | HEIGHT: 64.57 IN | HEART RATE: 74 BPM | TEMPERATURE: 97.3 F | WEIGHT: 172.6 LBS | DIASTOLIC BLOOD PRESSURE: 86 MMHG

## 2021-12-15 DIAGNOSIS — Z78.9 OTHER SPECIFIED HEALTH STATUS: ICD-10-CM

## 2021-12-15 PROCEDURE — 36415 COLL VENOUS BLD VENIPUNCTURE: CPT

## 2021-12-15 PROCEDURE — 99213 OFFICE O/P EST LOW 20 MIN: CPT

## 2021-12-15 PROCEDURE — 99203 OFFICE O/P NEW LOW 30 MIN: CPT | Mod: 25

## 2021-12-15 PROCEDURE — 95251 CONT GLUC MNTR ANALYSIS I&R: CPT

## 2021-12-16 RX ORDER — INSULIN GLARGINE 100 [IU]/ML
100 INJECTION, SOLUTION SUBCUTANEOUS
Qty: 6 | Refills: 2 | Status: COMPLETED | COMMUNITY
Start: 2021-12-15 | End: 2022-09-11

## 2021-12-18 PROBLEM — Z78.9 NON-SMOKER: Status: ACTIVE | Noted: 2021-12-18

## 2021-12-18 NOTE — REVIEW OF SYSTEMS
[All other systems negative] : All other systems negative [FreeTextEntry5] : chest pain when glucose is <90

## 2021-12-18 NOTE — PHYSICAL EXAM
[Alert] : alert [Well Nourished] : well nourished [Healthy Appearance] : healthy appearance [No Acute Distress] : no acute distress [Well Developed] : well developed [Normal Voice/Communication] : normal voice communication [Normal Sclera/Conjunctiva] : normal sclera/conjunctiva [No Proptosis] : no proptosis [No Neck Mass] : no neck mass was observed [No LAD] : no lymphadenopathy [Supple] : the neck was supple [Thyroid Not Enlarged] : the thyroid was not enlarged [No Thyroid Nodules] : no palpable thyroid nodules [No Respiratory Distress] : no respiratory distress [Normal Rate] : heart rate was normal [Not Distended] : not distended [No Stigmata of Cushings Syndrome] : no stigmata of Cushings Syndrome [Normal Gait] : normal gait [No Clubbing, Cyanosis] : no clubbing  or cyanosis of the fingernails [No Involuntary Movements] : no involuntary movements were seen [Right foot was examined, including] : right foot ~C was examined, including visual inspection with sensory and pulse exams [Left foot was examined, including] : left foot ~C was examined, including visual inspection with sensory and pulse exams [Normal] : normal [2+] : 2+ in the dorsalis pedis [No Tremors] : no tremors [Normal Sensation on Monofilament Testing] : normal sensation on monofilament testing of lower extremities [Normal Affect] : the affect was normal [Normal Insight/Judgement] : insight and judgment were intact [Normal Mood] : the mood was normal [Acanthosis Nigricans] : no acanthosis nigricans [Diminished Throughout Both Feet] : normal tactile sensation with monofilament testing throughout both feet

## 2021-12-18 NOTE — ASSESSMENT
[FreeTextEntry1] : This is a 46-year-old female with diabetes, hyperlipidemia here for evaluation.\par She was hospitalized at [facility] in December 2021 for left preseptal cellulitis and was diagnosed with diabetes when hemoglobin A1c was found to be 12.7%.\par She is currently on NovoLog 6 units before meals and Lantus 18 units at bedtime.  She has a freestyle kennedy CGM which is downloaded, interpreted, and reviewed.  Date range 12-20-21–12-.  Time in range 95%, high 5%, low 0%, average glucose 129 mg/dL, GMI 6.4%, standard deviation 21%.  Glucose levels are well controlled.\par Continue current insulin regimen.\par Check antibodies to evaluate for NATO.  Check C-peptide.  Further management pending blood work.\par Last ophthalmology exam was October 2021 and she denies retinopathy.\par Denies neuropathy.\par Denies nephropathy.  Check urine microalbumin.\par She is not on a statin.  Check lipid panel.\par She is not on an ACE inhibitor or ARB.\par She reports chest pain when her glucose is less than 90.

## 2021-12-18 NOTE — HISTORY OF PRESENT ILLNESS
[FreeTextEntry1] : CC: Diabetes\par This is a 46-year-old female with diabetes, hyperlipidemia here for evaluation.\par She was hospitalized at [facility] in December 2021 for left preseptal cellulitis and was diagnosed with diabetes when hemoglobin A1c was found to be 12.7%.\par She is currently on NovoLog 6 units before meals and Lantus 18 units at bedtime.  She has a freestyle kennedy CGM which is downloaded, interpreted, and reviewed.  See scanned download in chart.\par Last ophthalmology exam was October 2021 and she denies retinopathy.\par Denies neuropathy.\par Denies nephropathy.\par She is not on a statin.\par She is not on an ACE inhibitor or ARB.\par She reports chest pain when her glucose is less than 90.

## 2021-12-20 LAB
ALBUMIN SERPL ELPH-MCNC: 4.5 G/DL
ALP BLD-CCNC: 73 U/L
ALT SERPL-CCNC: 38 U/L
ANION GAP SERPL CALC-SCNC: 11 MMOL/L
AST SERPL-CCNC: 29 U/L
BILIRUB SERPL-MCNC: 0.4 MG/DL
BUN SERPL-MCNC: 11 MG/DL
C PEPTIDE SERPL-MCNC: 0.9 NG/ML
CALCIUM SERPL-MCNC: 9.8 MG/DL
CHLORIDE SERPL-SCNC: 101 MMOL/L
CO2 SERPL-SCNC: 28 MMOL/L
CREAT SERPL-MCNC: 0.49 MG/DL
GLUCOSE SERPL-MCNC: 77 MG/DL
PANC ISLET CELL AB SER QL: NORMAL
POTASSIUM SERPL-SCNC: 3.8 MMOL/L
PROT SERPL-MCNC: 7.6 G/DL
SODIUM SERPL-SCNC: 140 MMOL/L

## 2021-12-22 LAB
GAD65 AB SER-MCNC: 0 NMOL/L
ISLET CELL512 AB SER-SCNC: 0 NMOL/L

## 2021-12-25 LAB — ZINC TRANSPORTER 8 AB: <15 U/ML

## 2021-12-27 ENCOUNTER — NON-APPOINTMENT (OUTPATIENT)
Age: 46
End: 2021-12-27

## 2021-12-27 RX ORDER — INSULIN ASPART 100 [IU]/ML
100 INJECTION, SOLUTION INTRAVENOUS; SUBCUTANEOUS
Qty: 2 | Refills: 2 | Status: COMPLETED | COMMUNITY
Start: 2021-12-15 | End: 2021-12-27

## 2022-01-13 ENCOUNTER — APPOINTMENT (OUTPATIENT)
Dept: ENDOCRINOLOGY | Facility: CLINIC | Age: 47
End: 2022-01-13

## 2022-02-12 NOTE — ED CDU PROVIDER DISPOSITION NOTE - WET READ LAUNCH FT
Admission Medication History Completed by Pharmacy    See [facility] Admission Navigator for allergy information, preferred outpatient pharmacy, prior to admission medications and immunization status.     Medication History Sources:     [facility] [phone]    RN interview w/ patient    Care Everywhere/chart review    Changes made to PTA medication list (reason):    Added: Atomoxetine    Deleted: Folic acid, gabapentin, hydroxyzine, multivitamin, thiamine    Changed: Fluoxetine 20 mg daily > 40 mg daily; naltrexone 50 mg daily > 100 mg daily    Additional Information:    None    Prior to Admission medications    Medication Sig Last Dose Taking? Auth Provider   atomoxetine (STRATTERA) 40 MG capsule Take 80 mg by mouth daily  Yes Unknown, Entered By History   FLUoxetine (PROZAC) 20 MG capsule Take 40 mg by mouth daily  Yes Unknown, Entered By History   naltrexone (DEPADE/REVIA) 50 MG tablet Take 100 mg by mouth daily  Yes Unknown, Entered By History     Date completed: 02/12/22    Medication history completed by:   Malu Yates, PharmD, BCPS  Clinical Pharmacist            
There are no Wet Read(s) to document.

## 2022-05-02 ENCOUNTER — RX RENEWAL (OUTPATIENT)
Age: 47
End: 2022-05-02

## 2022-06-24 ENCOUNTER — APPOINTMENT (OUTPATIENT)
Dept: ENDOCRINOLOGY | Facility: CLINIC | Age: 47
End: 2022-06-24

## 2022-07-11 ENCOUNTER — LABORATORY RESULT (OUTPATIENT)
Age: 47
End: 2022-07-11

## 2022-07-11 ENCOUNTER — APPOINTMENT (OUTPATIENT)
Dept: ENDOCRINOLOGY | Facility: CLINIC | Age: 47
End: 2022-07-11

## 2022-07-11 VITALS
BODY MASS INDEX: 29.94 KG/M2 | OXYGEN SATURATION: 98 % | WEIGHT: 175.38 LBS | DIASTOLIC BLOOD PRESSURE: 86 MMHG | SYSTOLIC BLOOD PRESSURE: 125 MMHG | HEIGHT: 64.17 IN | HEART RATE: 76 BPM

## 2022-07-11 PROCEDURE — 99213 OFFICE O/P EST LOW 20 MIN: CPT | Mod: 25

## 2022-07-11 PROCEDURE — 36415 COLL VENOUS BLD VENIPUNCTURE: CPT

## 2022-07-11 RX ORDER — COVID-19 ANTIGEN TEST
KIT MISCELLANEOUS
Qty: 8 | Refills: 0 | Status: ACTIVE | COMMUNITY
Start: 2022-03-21

## 2022-07-11 NOTE — HISTORY OF PRESENT ILLNESS
[FreeTextEntry1] : CC: Diabetes\par This is a 46-year-old female with diabetes, hyperlipidemia here for follow-up.  \par She was hospitalized at [facility] in December 2021 for left preseptal cellulitis and was diagnosed with diabetes when hemoglobin A1c was found to be 12.7%.\par She is currently on metformin  mg 2 times a day and Lantus 18 units at bedtime.  She is off NovoLog.  She has a freestyle kennedy CGM however is not using it.  \par Last ophthalmology exam was October 2021 and she denies retinopathy.\par Denies neuropathy.\par Denies nephropathy.\par She is not on a statin.\par She is not on an ACE inhibitor or ARB.\par Antibodies for NATO are negative.

## 2022-07-11 NOTE — PHYSICAL EXAM
[Alert] : alert [Well Nourished] : well nourished [Healthy Appearance] : healthy appearance [No Acute Distress] : no acute distress [Well Developed] : well developed [Normal Voice/Communication] : normal voice communication [Normal Sclera/Conjunctiva] : normal sclera/conjunctiva [No Proptosis] : no proptosis [No Neck Mass] : no neck mass was observed [No LAD] : no lymphadenopathy [Supple] : the neck was supple [Thyroid Not Enlarged] : the thyroid was not enlarged [No Thyroid Nodules] : no palpable thyroid nodules [No Respiratory Distress] : no respiratory distress [Normal Rate] : heart rate was normal [Not Distended] : not distended [No Stigmata of Cushings Syndrome] : no stigmata of Cushings Syndrome [Normal Gait] : normal gait [No Clubbing, Cyanosis] : no clubbing  or cyanosis of the fingernails [No Involuntary Movements] : no involuntary movements were seen [Acanthosis Nigricans] : no acanthosis nigricans [Right foot was examined, including] : right foot ~C was examined, including visual inspection with sensory and pulse exams [Left foot was examined, including] : left foot ~C was examined, including visual inspection with sensory and pulse exams [Normal] : normal [2+] : 2+ in the dorsalis pedis [Diminished Throughout Both Feet] : normal tactile sensation with monofilament testing throughout both feet [No Tremors] : no tremors [Normal Sensation on Monofilament Testing] : normal sensation on monofilament testing of lower extremities [Normal Affect] : the affect was normal [Normal Insight/Judgement] : insight and judgment were intact [Normal Mood] : the mood was normal

## 2022-07-11 NOTE — ASSESSMENT
[FreeTextEntry1] : This is a 46-year-old female with diabetes, hyperlipidemia here for follow-up. \par She was hospitalized at [facility] in December 2021 for left preseptal cellulitis and was diagnosed with diabetes when hemoglobin A1c was found to be 12.7%.\par Check hemoglobin A1c.  Treatment options for diabetes reviewed.  Will consider discontinuing Lantus and starting GLP-1 agonist.  GI side effects reviewed.  No history of pancreatitis.  No personal or family history of thyroid cancer.\par Last ophthalmology exam was October 2021 and she denies retinopathy.\par Denies neuropathy.\par Denies nephropathy.  Check urine microalbumin.\par She is not on a statin.  Check lipid panel.\par Check vitamin B12 as she is on metformin.\par She is not on an ACE inhibitor or ARB.\par

## 2022-07-12 LAB
ALBUMIN SERPL ELPH-MCNC: 4.5 G/DL
ALP BLD-CCNC: 69 U/L
ALT SERPL-CCNC: 33 U/L
ANION GAP SERPL CALC-SCNC: 11 MMOL/L
AST SERPL-CCNC: 24 U/L
BASOPHILS # BLD AUTO: 0.04 K/UL
BASOPHILS NFR BLD AUTO: 0.6 %
BILIRUB SERPL-MCNC: 0.6 MG/DL
BUN SERPL-MCNC: 12 MG/DL
CALCIUM SERPL-MCNC: 9.5 MG/DL
CHLORIDE SERPL-SCNC: 101 MMOL/L
CHOLEST SERPL-MCNC: 204 MG/DL
CO2 SERPL-SCNC: 27 MMOL/L
CREAT SERPL-MCNC: 0.49 MG/DL
CREAT SPEC-SCNC: 234 MG/DL
EGFR: 118 ML/MIN/1.73M2
EOSINOPHIL # BLD AUTO: 0.25 K/UL
EOSINOPHIL NFR BLD AUTO: 3.6 %
ESTIMATED AVERAGE GLUCOSE: 163 MG/DL
GLUCOSE BLDC GLUCOMTR-MCNC: 192
GLUCOSE SERPL-MCNC: 189 MG/DL
HBA1C MFR BLD HPLC: 7.3 %
HCT VFR BLD CALC: 43.7 %
HDLC SERPL-MCNC: 57 MG/DL
HGB BLD-MCNC: 14.6 G/DL
IMM GRANULOCYTES NFR BLD AUTO: 0.1 %
LDLC SERPL CALC-MCNC: 132 MG/DL
LYMPHOCYTES # BLD AUTO: 1.77 K/UL
LYMPHOCYTES NFR BLD AUTO: 25.3 %
MAN DIFF?: NORMAL
MCHC RBC-ENTMCNC: 31 PG
MCHC RBC-ENTMCNC: 33.4 GM/DL
MCV RBC AUTO: 92.8 FL
MICROALBUMIN 24H UR DL<=1MG/L-MCNC: 2.3 MG/DL
MICROALBUMIN/CREAT 24H UR-RTO: 10 MG/G
MONOCYTES # BLD AUTO: 0.69 K/UL
MONOCYTES NFR BLD AUTO: 9.9 %
NEUTROPHILS # BLD AUTO: 4.24 K/UL
NEUTROPHILS NFR BLD AUTO: 60.5 %
NONHDLC SERPL-MCNC: 148 MG/DL
PLATELET # BLD AUTO: 312 K/UL
POTASSIUM SERPL-SCNC: 4.1 MMOL/L
PROT SERPL-MCNC: 7 G/DL
RBC # BLD: 4.71 M/UL
RBC # FLD: 13.4 %
SODIUM SERPL-SCNC: 139 MMOL/L
TRIGL SERPL-MCNC: 79 MG/DL
VIT B12 SERPL-MCNC: <150 PG/ML
WBC # FLD AUTO: 7 K/UL

## 2022-07-14 RX ORDER — SEMAGLUTIDE 1.34 MG/ML
2 INJECTION, SOLUTION SUBCUTANEOUS
Qty: 6 | Refills: 2 | Status: COMPLETED | COMMUNITY
Start: 2022-07-14 | End: 2023-04-10

## 2022-07-14 RX ORDER — PEN NEEDLE, DIABETIC 29 G X1/2"
32G X 4 MM NEEDLE, DISPOSABLE MISCELLANEOUS
Qty: 1 | Refills: 3 | Status: ACTIVE | COMMUNITY
Start: 2021-10-27 | End: 1900-01-01

## 2022-07-14 RX ORDER — ATORVASTATIN CALCIUM 10 MG/1
10 TABLET, FILM COATED ORAL
Qty: 90 | Refills: 2 | Status: COMPLETED | COMMUNITY
Start: 2022-07-14 | End: 2023-04-10

## 2022-07-22 ENCOUNTER — APPOINTMENT (OUTPATIENT)
Dept: ENDOCRINOLOGY | Facility: CLINIC | Age: 47
End: 2022-07-22

## 2022-07-22 PROCEDURE — 96372 THER/PROPH/DIAG INJ SC/IM: CPT

## 2022-07-22 RX ORDER — CYANOCOBALAMIN 1000 UG/ML
1000 INJECTION INTRAMUSCULAR; SUBCUTANEOUS
Qty: 0 | Refills: 0 | Status: COMPLETED | OUTPATIENT
Start: 2022-07-22

## 2022-07-29 ENCOUNTER — APPOINTMENT (OUTPATIENT)
Dept: ENDOCRINOLOGY | Facility: CLINIC | Age: 47
End: 2022-07-29

## 2022-07-29 PROCEDURE — 96372 THER/PROPH/DIAG INJ SC/IM: CPT

## 2022-07-29 RX ORDER — CYANOCOBALAMIN 1000 UG/ML
1000 INJECTION INTRAMUSCULAR; SUBCUTANEOUS
Qty: 0 | Refills: 0 | Status: COMPLETED | OUTPATIENT
Start: 2022-07-29

## 2022-07-29 RX ADMIN — CYANOCOBALAMIN 0 MCG/ML: 1000 INJECTION, SOLUTION INTRAMUSCULAR at 00:00

## 2022-07-29 RX ADMIN — CYANOCOBALAMIN 0 MCG/ML: 1000 INJECTION INTRAMUSCULAR; SUBCUTANEOUS at 00:00

## 2022-08-04 ENCOUNTER — APPOINTMENT (OUTPATIENT)
Dept: INTERNAL MEDICINE | Facility: CLINIC | Age: 47
End: 2022-08-04

## 2022-08-04 PROCEDURE — 96372 THER/PROPH/DIAG INJ SC/IM: CPT

## 2022-08-04 RX ORDER — CYANOCOBALAMIN 1000 UG/ML
1000 INJECTION INTRAMUSCULAR; SUBCUTANEOUS
Qty: 0 | Refills: 0 | Status: COMPLETED | OUTPATIENT
Start: 2022-08-04

## 2022-08-04 RX ADMIN — CYANOCOBALAMIN 0 MCG/ML: 1000 INJECTION INTRAMUSCULAR; SUBCUTANEOUS at 00:00

## 2022-08-04 RX ADMIN — CYANOCOBALAMIN 0 MCG/ML: 1000 INJECTION, SOLUTION INTRAMUSCULAR at 00:00

## 2022-08-12 ENCOUNTER — APPOINTMENT (OUTPATIENT)
Dept: INTERNAL MEDICINE | Facility: CLINIC | Age: 47
End: 2022-08-12

## 2022-08-12 PROCEDURE — 36415 COLL VENOUS BLD VENIPUNCTURE: CPT

## 2022-11-18 ENCOUNTER — APPOINTMENT (OUTPATIENT)
Dept: ENDOCRINOLOGY | Facility: CLINIC | Age: 47
End: 2022-11-18

## 2022-11-18 VITALS
DIASTOLIC BLOOD PRESSURE: 88 MMHG | HEIGHT: 64 IN | HEART RATE: 81 BPM | BODY MASS INDEX: 27.31 KG/M2 | SYSTOLIC BLOOD PRESSURE: 148 MMHG | WEIGHT: 160 LBS

## 2022-11-18 PROCEDURE — 99214 OFFICE O/P EST MOD 30 MIN: CPT | Mod: 25

## 2022-11-18 PROCEDURE — 36415 COLL VENOUS BLD VENIPUNCTURE: CPT

## 2022-11-18 PROCEDURE — 95251 CONT GLUC MNTR ANALYSIS I&R: CPT

## 2022-11-18 NOTE — HISTORY OF PRESENT ILLNESS
[FreeTextEntry1] : CC: Diabetes\par This is a 46-year-old female with type 2 diabetes mellitus, diabetes, hyperlipidemia, vitamin B12 deficiency, here for follow-up.  \par She was hospitalized at [facility] in December 2021 for left preseptal cellulitis and was diagnosed with diabetes when hemoglobin A1c was found to be 12.7%.\par She is currently on metformin  mg 2 times a day and Ozempic 0.5 mg weekly.  She is off NovoLog and Lantus.  She has a freestyle kennedy CGM which was downloaded, interpreted, and reviewed.  See scanned download in chart.   \par Last ophthalmology exam was October 2022 and she denies retinopathy.\par Denies neuropathy.\par Denies nephropathy.\par She is not on a statin.  She was prescribed atorvastatin 10 mg at bedtime after last visit however she did not start taking it yet.\par She is not on an ACE inhibitor or ARB.\par Antibodies for NATO are negative.

## 2022-11-18 NOTE — ASSESSMENT
[FreeTextEntry1] : This is a 46-year-old female with type II diabetes, hyperlipidemia, vitamin B12 deficiency, here for follow-up. \par She was hospitalized at [facility] in December 2021 for left preseptal cellulitis and was diagnosed with diabetes when hemoglobin A1c was found to be 12.7%.\par Check hemoglobin A1c.\par Freestyle kennedy CGM downloaded, interpreted, and reviewed.  Date range 11/5/2022 - 11/18/2022.  Time in range 100%, high 0%, very high 0%, low 0%, very low 0%.  Average glucose 140 mg/dL, GMI 6%, glucose variability 17.6%.  After review of hemoglobin A1c will consider decreasing metformin to 500 mg 1 time a day and increasing Ozempic to 1 mg weekly.\par Last ophthalmology exam was October 2022 and she denies retinopathy.\par Denies neuropathy.\par Denies nephropathy.  Check urine microalbumin.\par She is not on a statin.  Check lipid panel.  Will likely start atorvastatin 10 mg daily since she has it at the pharmacy.\par Check vitamin B12 as she is on metformin.\par She is not on an ACE inhibitor or ARB.  BP is mildly elevated.  Check repeat.\par

## 2022-11-29 LAB
ALBUMIN SERPL ELPH-MCNC: 4.3 G/DL
ALP BLD-CCNC: 65 U/L
ALT SERPL-CCNC: 24 U/L
ANION GAP SERPL CALC-SCNC: 13 MMOL/L
AST SERPL-CCNC: 24 U/L
BASOPHILS # BLD AUTO: 0.05 K/UL
BASOPHILS NFR BLD AUTO: 0.7 %
BILIRUB SERPL-MCNC: 0.4 MG/DL
BUN SERPL-MCNC: 13 MG/DL
CALCIUM SERPL-MCNC: 9.8 MG/DL
CHLORIDE SERPL-SCNC: 103 MMOL/L
CHOLEST SERPL-MCNC: 196 MG/DL
CO2 SERPL-SCNC: 25 MMOL/L
CREAT SERPL-MCNC: 0.46 MG/DL
CREAT SPEC-SCNC: 182 MG/DL
EGFR: 119 ML/MIN/1.73M2
EOSINOPHIL # BLD AUTO: 0.19 K/UL
EOSINOPHIL NFR BLD AUTO: 2.8 %
ESTIMATED AVERAGE GLUCOSE: 103 MG/DL
GLUCOSE SERPL-MCNC: 125 MG/DL
HBA1C MFR BLD HPLC: 5.2 %
HCT VFR BLD CALC: 41.9 %
HDLC SERPL-MCNC: 60 MG/DL
HGB BLD-MCNC: 13.9 G/DL
IMM GRANULOCYTES NFR BLD AUTO: 0.1 %
LDLC SERPL CALC-MCNC: 122 MG/DL
LYMPHOCYTES # BLD AUTO: 1.79 K/UL
LYMPHOCYTES NFR BLD AUTO: 26.1 %
MAN DIFF?: NORMAL
MCHC RBC-ENTMCNC: 30.9 PG
MCHC RBC-ENTMCNC: 33.2 GM/DL
MCV RBC AUTO: 93.1 FL
MICROALBUMIN 24H UR DL<=1MG/L-MCNC: <1.2 MG/DL
MICROALBUMIN/CREAT 24H UR-RTO: NORMAL MG/G
MONOCYTES # BLD AUTO: 0.7 K/UL
MONOCYTES NFR BLD AUTO: 10.2 %
NEUTROPHILS # BLD AUTO: 4.12 K/UL
NEUTROPHILS NFR BLD AUTO: 60.1 %
NONHDLC SERPL-MCNC: 137 MG/DL
PLATELET # BLD AUTO: 282 K/UL
POTASSIUM SERPL-SCNC: 4 MMOL/L
PROT SERPL-MCNC: 7.1 G/DL
RBC # BLD: 4.5 M/UL
RBC # FLD: 13.9 %
SODIUM SERPL-SCNC: 141 MMOL/L
TRIGL SERPL-MCNC: 75 MG/DL
VIT B12 SERPL-MCNC: <150 PG/ML
WBC # FLD AUTO: 6.86 K/UL

## 2022-12-14 ENCOUNTER — NON-APPOINTMENT (OUTPATIENT)
Age: 47
End: 2022-12-14

## 2023-04-11 ENCOUNTER — APPOINTMENT (OUTPATIENT)
Dept: ENDOCRINOLOGY | Facility: CLINIC | Age: 48
End: 2023-04-11

## 2023-08-29 ENCOUNTER — APPOINTMENT (OUTPATIENT)
Dept: ENDOCRINOLOGY | Facility: CLINIC | Age: 48
End: 2023-08-29
Payer: COMMERCIAL

## 2023-08-29 VITALS
DIASTOLIC BLOOD PRESSURE: 98 MMHG | WEIGHT: 154 LBS | OXYGEN SATURATION: 98 % | HEIGHT: 64 IN | BODY MASS INDEX: 26.29 KG/M2 | TEMPERATURE: 97.3 F | SYSTOLIC BLOOD PRESSURE: 148 MMHG | HEART RATE: 81 BPM

## 2023-08-29 PROCEDURE — 36415 COLL VENOUS BLD VENIPUNCTURE: CPT

## 2023-08-29 PROCEDURE — 99214 OFFICE O/P EST MOD 30 MIN: CPT | Mod: 25

## 2023-08-29 RX ORDER — BLOOD SUGAR DIAGNOSTIC
STRIP MISCELLANEOUS TWICE DAILY
Qty: 2 | Refills: 0 | Status: ACTIVE | COMMUNITY
Start: 2021-10-27 | End: 1900-01-01

## 2023-08-29 RX ORDER — TIRZEPATIDE 2.5 MG/.5ML
2.5 INJECTION, SOLUTION SUBCUTANEOUS
Qty: 4 | Refills: 0 | Status: DISCONTINUED | COMMUNITY
Start: 2022-11-29 | End: 2023-08-29

## 2023-08-29 NOTE — HISTORY OF PRESENT ILLNESS
[FreeTextEntry1] : CC: Diabetes This is a 47-year-old female with type 2 diabetes mellitus, hyperlipidemia, vitamin B12 deficiency, here for follow-up.   She was hospitalized at [facility] in December 2021 for left preseptal cellulitis and was diagnosed with diabetes when hemoglobin A1c was found to be 12.7%. She is currently on metformin  mg once a day and Ozempic 1 mg weekly.  She is off NovoLog and Lantus.  She hasFreestyle Angel[employer] CGM but unable to download. Last ophthalmology exam was October 2022 and she denies retinopathy. Denies neuropathy. Denies nephropathy. She is on atorvastatin 10 mg daily. She is not on an ACE inhibitor or ARB. Antibodies for NATO are negative. She is not currently taking vitamin B12.

## 2023-08-29 NOTE — ASSESSMENT
[FreeTextEntry1] : This is a 47-year-old female with type II diabetes, hyperlipidemia, vitamin B12 deficiency, here for follow-up.  She was hospitalized at [facility] in December 2021 for left preseptal cellulitis and was diagnosed with diabetes when hemoglobin A1c was found to be 12.7%. Check hemoglobin A1c. She has Freestyle Angelina CGM but unable to download.  Will consider increasing Ozempic to 2 mg weekly as weight has plateaued.  Last ophthalmology exam was October 2022 and she denies retinopathy. Denies neuropathy. Denies nephropathy.  Check urine microalbumin. She is on atorvastatin 10 mg daily.  Check lipid panel. Check vitamin B12 as she is on metformin. She is not on an ACE inhibitor or ARB.  BP is elevated. Advised to check BP at home and call with readings.  Will consider starting ACE inhibitor.

## 2023-08-29 NOTE — ADDENDUM
[FreeTextEntry1] : By signing my name below, I, Lora Betancur, attest that this document has been prepared under the direction and in the presence of Dr. Casillas.  I, Nina Casillas MD, personally performed the services described in this documentation. All medical record entries made by the scribe were at my discretion and in my presence. I have reviewed the chart and discharge instructions (if applicable) and agree that the record reflects my personal permanence and is accurate and complete.

## 2023-08-29 NOTE — REVIEW OF SYSTEMS
[Recent Weight Loss (___ Lbs)] : recent weight loss: [unfilled] lbs [All other systems negative] : All other systems negative [Blurred Vision] : blurred vision

## 2023-08-29 NOTE — PHYSICAL EXAM
[Alert] : alert [Well Nourished] : well nourished [Healthy Appearance] : healthy appearance [No Acute Distress] : no acute distress [Well Developed] : well developed [Normal Voice/Communication] : normal voice communication [Normal Sclera/Conjunctiva] : normal sclera/conjunctiva [No Proptosis] : no proptosis [No Neck Mass] : no neck mass was observed [No LAD] : no lymphadenopathy [Supple] : the neck was supple [Thyroid Not Enlarged] : the thyroid was not enlarged [No Thyroid Nodules] : no palpable thyroid nodules [No Respiratory Distress] : no respiratory distress [Normal Rate] : heart rate was normal [Not Distended] : not distended [No Stigmata of Cushings Syndrome] : no stigmata of Cushings Syndrome [Normal Gait] : normal gait [No Clubbing, Cyanosis] : no clubbing  or cyanosis of the fingernails [No Involuntary Movements] : no involuntary movements were seen [Right foot was examined, including] : right foot ~C was examined, including visual inspection with sensory and pulse exams [Left foot was examined, including] : left foot ~C was examined, including visual inspection with sensory and pulse exams [2+] : 2+ in the dorsalis pedis [No Tremors] : no tremors [Normal Sensation on Monofilament Testing] : normal sensation on monofilament testing of lower extremities [Normal Affect] : the affect was normal [Normal Insight/Judgement] : insight and judgment were intact [Normal Mood] : the mood was normal [Acanthosis Nigricans] : no acanthosis nigricans

## 2023-08-30 LAB
ALBUMIN SERPL ELPH-MCNC: 4.7 G/DL
ALP BLD-CCNC: 78 U/L
ALT SERPL-CCNC: 23 U/L
ANION GAP SERPL CALC-SCNC: 10 MMOL/L
AST SERPL-CCNC: 22 U/L
BILIRUB SERPL-MCNC: 0.6 MG/DL
BUN SERPL-MCNC: 10 MG/DL
CALCIUM SERPL-MCNC: 9.9 MG/DL
CHLORIDE SERPL-SCNC: 101 MMOL/L
CHOLEST SERPL-MCNC: 159 MG/DL
CO2 SERPL-SCNC: 29 MMOL/L
CREAT SERPL-MCNC: 0.51 MG/DL
CREAT SPEC-SCNC: 185 MG/DL
EGFR: 116 ML/MIN/1.73M2
ESTIMATED AVERAGE GLUCOSE: 114 MG/DL
GLUCOSE SERPL-MCNC: 98 MG/DL
HBA1C MFR BLD HPLC: 5.6 %
HCT VFR BLD CALC: 42.6 %
HDLC SERPL-MCNC: 64 MG/DL
HGB BLD-MCNC: 13.9 G/DL
LDLC SERPL CALC-MCNC: 84 MG/DL
MCHC RBC-ENTMCNC: 30.4 PG
MCHC RBC-ENTMCNC: 32.6 GM/DL
MCV RBC AUTO: 93.2 FL
MICROALBUMIN 24H UR DL<=1MG/L-MCNC: 1.5 MG/DL
MICROALBUMIN/CREAT 24H UR-RTO: 8 MG/G
NONHDLC SERPL-MCNC: 95 MG/DL
PLATELET # BLD AUTO: 264 K/UL
POTASSIUM SERPL-SCNC: 4 MMOL/L
PROT SERPL-MCNC: 7.6 G/DL
RBC # BLD: 4.57 M/UL
RBC # FLD: 13.8 %
SODIUM SERPL-SCNC: 140 MMOL/L
TRIGL SERPL-MCNC: 53 MG/DL
VIT B12 SERPL-MCNC: <150 PG/ML
WBC # FLD AUTO: 7.58 K/UL

## 2023-10-05 RX ORDER — BLOOD-GLUCOSE SENSOR
EACH MISCELLANEOUS
Qty: 2 | Refills: 6 | Status: COMPLETED | COMMUNITY
Start: 2021-10-20 | End: 2024-05-01

## 2023-10-16 ENCOUNTER — NON-APPOINTMENT (OUTPATIENT)
Age: 48
End: 2023-10-16

## 2023-10-16 ENCOUNTER — APPOINTMENT (OUTPATIENT)
Dept: INTERNAL MEDICINE | Facility: CLINIC | Age: 48
End: 2023-10-16
Payer: COMMERCIAL

## 2023-10-16 VITALS
TEMPERATURE: 97.8 F | OXYGEN SATURATION: 98 % | DIASTOLIC BLOOD PRESSURE: 95 MMHG | WEIGHT: 149 LBS | BODY MASS INDEX: 25.44 KG/M2 | HEART RATE: 84 BPM | HEIGHT: 64 IN | SYSTOLIC BLOOD PRESSURE: 151 MMHG | RESPIRATION RATE: 18 BRPM

## 2023-10-16 VITALS — SYSTOLIC BLOOD PRESSURE: 152 MMHG | DIASTOLIC BLOOD PRESSURE: 90 MMHG

## 2023-10-16 DIAGNOSIS — Z00.00 ENCOUNTER FOR GENERAL ADULT MEDICAL EXAMINATION W/OUT ABNORMAL FINDINGS: ICD-10-CM

## 2023-10-16 DIAGNOSIS — R51.9 HEADACHE, UNSPECIFIED: ICD-10-CM

## 2023-10-16 PROCEDURE — 96372 THER/PROPH/DIAG INJ SC/IM: CPT

## 2023-10-16 PROCEDURE — 99213 OFFICE O/P EST LOW 20 MIN: CPT | Mod: 25

## 2023-10-16 PROCEDURE — 99396 PREV VISIT EST AGE 40-64: CPT | Mod: 25

## 2023-10-16 RX ORDER — CYANOCOBALAMIN 1000 UG/ML
1000 INJECTION INTRAMUSCULAR; SUBCUTANEOUS
Qty: 0 | Refills: 0 | Status: COMPLETED | OUTPATIENT
Start: 2023-10-16

## 2023-10-16 RX ADMIN — CYANOCOBALAMIN 0 MCG/ML: 1000 INJECTION INTRAMUSCULAR; SUBCUTANEOUS at 00:00

## 2023-10-23 LAB
ALBUMIN SERPL ELPH-MCNC: 4.8 G/DL
ALP BLD-CCNC: 67 U/L
ALT SERPL-CCNC: 28 U/L
ANION GAP SERPL CALC-SCNC: 10 MMOL/L
AST SERPL-CCNC: 22 U/L
BASOPHILS # BLD AUTO: 0.04 K/UL
BASOPHILS NFR BLD AUTO: 0.6 %
BILIRUB SERPL-MCNC: 0.5 MG/DL
BUN SERPL-MCNC: 16 MG/DL
CALCIUM SERPL-MCNC: 9.7 MG/DL
CHLORIDE SERPL-SCNC: 103 MMOL/L
CHOLEST SERPL-MCNC: 140 MG/DL
CO2 SERPL-SCNC: 29 MMOL/L
CREAT SERPL-MCNC: 0.49 MG/DL
EGFR: 117 ML/MIN/1.73M2
EOSINOPHIL # BLD AUTO: 0.13 K/UL
EOSINOPHIL NFR BLD AUTO: 1.9 %
GLUCOSE SERPL-MCNC: 100 MG/DL
HCT VFR BLD CALC: 42.9 %
HDLC SERPL-MCNC: 63 MG/DL
HGB BLD-MCNC: 14.4 G/DL
IMM GRANULOCYTES NFR BLD AUTO: 0.3 %
LDLC SERPL CALC-MCNC: 64 MG/DL
LYMPHOCYTES # BLD AUTO: 1.95 K/UL
LYMPHOCYTES NFR BLD AUTO: 28.3 %
MAN DIFF?: NORMAL
MCHC RBC-ENTMCNC: 30.6 PG
MCHC RBC-ENTMCNC: 33.6 GM/DL
MCV RBC AUTO: 91.1 FL
MONOCYTES # BLD AUTO: 0.6 K/UL
MONOCYTES NFR BLD AUTO: 8.7 %
NEUTROPHILS # BLD AUTO: 4.15 K/UL
NEUTROPHILS NFR BLD AUTO: 60.2 %
NONHDLC SERPL-MCNC: 77 MG/DL
PLATELET # BLD AUTO: 275 K/UL
POTASSIUM SERPL-SCNC: 3.9 MMOL/L
PROT SERPL-MCNC: 7.4 G/DL
RBC # BLD: 4.71 M/UL
RBC # FLD: 13 %
SODIUM SERPL-SCNC: 141 MMOL/L
TRIGL SERPL-MCNC: 58 MG/DL
VIT B12 SERPL-MCNC: <150 PG/ML
WBC # FLD AUTO: 6.89 K/UL

## 2023-10-23 RX ORDER — CYANOCOBALAMIN (VITAMIN B-12) 2000 MCG
2000 TABLET, EXTENDED RELEASE ORAL DAILY
Qty: 90 | Refills: 3 | Status: ACTIVE | COMMUNITY
Start: 2023-10-23 | End: 1900-01-01

## 2023-10-25 ENCOUNTER — APPOINTMENT (OUTPATIENT)
Dept: CARDIOLOGY | Facility: CLINIC | Age: 48
End: 2023-10-25
Payer: COMMERCIAL

## 2023-10-25 VITALS
HEART RATE: 73 BPM | RESPIRATION RATE: 14 BRPM | DIASTOLIC BLOOD PRESSURE: 84 MMHG | BODY MASS INDEX: 25.75 KG/M2 | OXYGEN SATURATION: 99 % | TEMPERATURE: 97.2 F | SYSTOLIC BLOOD PRESSURE: 145 MMHG | WEIGHT: 150 LBS

## 2023-10-25 DIAGNOSIS — R07.89 OTHER CHEST PAIN: ICD-10-CM

## 2023-10-25 DIAGNOSIS — D64.9 ANEMIA, UNSPECIFIED: ICD-10-CM

## 2023-10-25 PROCEDURE — 93000 ELECTROCARDIOGRAM COMPLETE: CPT

## 2023-10-25 PROCEDURE — 99204 OFFICE O/P NEW MOD 45 MIN: CPT | Mod: 25

## 2023-11-01 ENCOUNTER — APPOINTMENT (OUTPATIENT)
Dept: INTERNAL MEDICINE | Facility: CLINIC | Age: 48
End: 2023-11-01
Payer: COMMERCIAL

## 2023-11-01 PROCEDURE — 96372 THER/PROPH/DIAG INJ SC/IM: CPT

## 2023-11-08 ENCOUNTER — APPOINTMENT (OUTPATIENT)
Dept: INTERNAL MEDICINE | Facility: CLINIC | Age: 48
End: 2023-11-08
Payer: COMMERCIAL

## 2023-11-08 PROCEDURE — 96372 THER/PROPH/DIAG INJ SC/IM: CPT

## 2023-11-08 RX ADMIN — CYANOCOBALAMIN 0 MCG/ML: 1000 INJECTION INTRAMUSCULAR; SUBCUTANEOUS at 00:00

## 2023-11-15 ENCOUNTER — APPOINTMENT (OUTPATIENT)
Dept: INTERNAL MEDICINE | Facility: CLINIC | Age: 48
End: 2023-11-15
Payer: COMMERCIAL

## 2023-11-15 PROCEDURE — 96372 THER/PROPH/DIAG INJ SC/IM: CPT

## 2023-11-15 RX ORDER — CYANOCOBALAMIN 1000 UG/ML
1000 INJECTION INTRAMUSCULAR; SUBCUTANEOUS
Qty: 0 | Refills: 0 | Status: COMPLETED | OUTPATIENT
Start: 2023-11-15

## 2023-11-15 RX ADMIN — CYANOCOBALAMIN 0 MCG/ML: 1000 INJECTION INTRAMUSCULAR; SUBCUTANEOUS at 00:00

## 2023-11-17 ENCOUNTER — APPOINTMENT (OUTPATIENT)
Dept: CARDIOLOGY | Facility: CLINIC | Age: 48
End: 2023-11-17
Payer: COMMERCIAL

## 2023-11-17 PROCEDURE — 93306 TTE W/DOPPLER COMPLETE: CPT

## 2023-11-22 ENCOUNTER — APPOINTMENT (OUTPATIENT)
Dept: INTERNAL MEDICINE | Facility: CLINIC | Age: 48
End: 2023-11-22
Payer: COMMERCIAL

## 2023-11-22 PROCEDURE — 96372 THER/PROPH/DIAG INJ SC/IM: CPT

## 2023-11-22 RX ORDER — ATORVASTATIN CALCIUM 10 MG/1
10 TABLET, FILM COATED ORAL
Qty: 90 | Refills: 2 | Status: ACTIVE | COMMUNITY
Start: 2023-11-22 | End: 1900-01-01

## 2023-11-22 RX ORDER — CYANOCOBALAMIN 1000 UG/ML
1000 INJECTION INTRAMUSCULAR; SUBCUTANEOUS
Qty: 0 | Refills: 0 | Status: COMPLETED | OUTPATIENT
Start: 2023-11-22

## 2023-11-22 RX ADMIN — CYANOCOBALAMIN 0 MCG/ML: 1000 INJECTION INTRAMUSCULAR; SUBCUTANEOUS at 00:00

## 2023-11-27 NOTE — ED ADULT NURSE NOTE - DOES PATIENT HAVE ADVANCE DIRECTIVE
Care Due:                  Date            Visit Type   Department     Provider  --------------------------------------------------------------------------------                                EP -                              PRIMARY      [facility] FAMILY  Last Visit: 02-      CARE ([facility])   MEDICINE       Carlos Pichardo  Next Visit: None Scheduled  None         None Found                                                            Last  Test          Frequency    Reason                     Performed    Due Date  --------------------------------------------------------------------------------    Office Visit  15 months..  hydroCHLOROthiazide,       02-   05-                             levothyroxine, traZODone.    CMP.........  12 months..  hydroCHLOROthiazide......  09-   09-    TSH.........  12 months..  levothyroxine............  09-   09-    [facility] Embedded Care Due Messages. Reference number: [id number].   11/27/2023 12:52:15 AM CST   No

## 2023-12-06 ENCOUNTER — APPOINTMENT (OUTPATIENT)
Dept: INTERNAL MEDICINE | Facility: CLINIC | Age: 48
End: 2023-12-06
Payer: COMMERCIAL

## 2023-12-06 VITALS
TEMPERATURE: 97.3 F | HEART RATE: 79 BPM | DIASTOLIC BLOOD PRESSURE: 89 MMHG | WEIGHT: 150 LBS | SYSTOLIC BLOOD PRESSURE: 147 MMHG | BODY MASS INDEX: 25.61 KG/M2 | HEIGHT: 64 IN | OXYGEN SATURATION: 98 %

## 2023-12-06 VITALS — SYSTOLIC BLOOD PRESSURE: 136 MMHG | DIASTOLIC BLOOD PRESSURE: 84 MMHG

## 2023-12-06 PROCEDURE — 99213 OFFICE O/P EST LOW 20 MIN: CPT | Mod: 25

## 2023-12-06 PROCEDURE — 36415 COLL VENOUS BLD VENIPUNCTURE: CPT

## 2023-12-11 LAB — VIT B12 SERPL-MCNC: 313 PG/ML

## 2023-12-19 ENCOUNTER — APPOINTMENT (OUTPATIENT)
Dept: ENDOCRINOLOGY | Facility: CLINIC | Age: 48
End: 2023-12-19
Payer: COMMERCIAL

## 2023-12-19 VITALS
WEIGHT: 150 LBS | RESPIRATION RATE: 12 BRPM | TEMPERATURE: 97 F | HEART RATE: 87 BPM | HEIGHT: 64 IN | SYSTOLIC BLOOD PRESSURE: 148 MMHG | BODY MASS INDEX: 25.61 KG/M2 | OXYGEN SATURATION: 100 % | DIASTOLIC BLOOD PRESSURE: 89 MMHG

## 2023-12-19 PROCEDURE — 36415 COLL VENOUS BLD VENIPUNCTURE: CPT

## 2023-12-19 PROCEDURE — 99214 OFFICE O/P EST MOD 30 MIN: CPT | Mod: 25

## 2023-12-19 RX ORDER — BLOOD-GLUCOSE SENSOR
EACH MISCELLANEOUS
Qty: 2 | Refills: 6 | Status: ACTIVE | COMMUNITY
Start: 2023-12-19 | End: 1900-01-01

## 2023-12-19 NOTE — HISTORY OF PRESENT ILLNESS
[FreeTextEntry1] : CC: Diabetes This is a 48-year-old female with type 2 diabetes mellitus, hyperlipidemia, vitamin B12 deficiency, here for follow-up.   She was hospitalized at [facility] in December 2021 for left preseptal cellulitis and was diagnosed with diabetes when hemoglobin A1c was found to be 12.7%. She is currently on metformin  mg once a day and Ozempic 1 mg weekly. She reports she takes metformin approximately 4 days a week.  She is off NovoLog and Lantus.  Last ophthalmology exam was October 2022 and she denies retinopathy. Denies neuropathy. Denies nephropathy. She is on atorvastatin 10 mg daily. She is not on an ACE inhibitor or ARB. Antibodies for NATO are negative. She is not currently taking vitamin B12. However she will start 1000 mcg daily.

## 2023-12-19 NOTE — PHYSICAL EXAM
[Alert] : alert [Well Nourished] : well nourished [Healthy Appearance] : healthy appearance [No Acute Distress] : no acute distress [Well Developed] : well developed [Normal Voice/Communication] : normal voice communication [Normal Sclera/Conjunctiva] : normal sclera/conjunctiva [No Proptosis] : no proptosis [No Neck Mass] : no neck mass was observed [No LAD] : no lymphadenopathy [Supple] : the neck was supple [Thyroid Not Enlarged] : the thyroid was not enlarged [No Thyroid Nodules] : no palpable thyroid nodules [No Respiratory Distress] : no respiratory distress [Normal Rate] : heart rate was normal [No Stigmata of Cushings Syndrome] : no stigmata of Cushings Syndrome [Normal Gait] : normal gait [Right foot was examined, including] : right foot ~C was examined, including visual inspection with sensory and pulse exams [Left foot was examined, including] : left foot ~C was examined, including visual inspection with sensory and pulse exams [2+] : 2+ in the dorsalis pedis [Normal Sensation on Monofilament Testing] : normal sensation on monofilament testing of lower extremities [Normal Affect] : the affect was normal [Normal Insight/Judgement] : insight and judgment were intact [Normal Mood] : the mood was normal [Acanthosis Nigricans] : no acanthosis nigricans

## 2023-12-19 NOTE — ASSESSMENT
[FreeTextEntry1] : This is a 48-year-old female with type II diabetes, hyperlipidemia, vitamin B12 deficiency, here for follow-up. She was hospitalized at [facility] in December 2021 for left preseptal cellulitis and was diagnosed with diabetes when hemoglobin A1c was found to be 12.7%. Check hemoglobin A1c. Will consider increasing Ozempic to 2 mg weekly.  Will consider discontinuing metformin. Last ophthalmology exam was October 2022 and she denies retinopathy. Follow-up advised.  Denies neuropathy. Denies nephropathy. Check urine microalbumin. She is on atorvastatin 10 mg daily. Check lipid panel. She is not on an ACE inhibitor or ARB. BP is elevated. Advised to check BP at home and call with readings. Will consider starting ACE inhibitor. rolling walker

## 2023-12-21 LAB
ALBUMIN SERPL ELPH-MCNC: 4.4 G/DL
ALP BLD-CCNC: 67 U/L
ALT SERPL-CCNC: 24 U/L
ANION GAP SERPL CALC-SCNC: 10 MMOL/L
AST SERPL-CCNC: 20 U/L
BILIRUB SERPL-MCNC: 0.4 MG/DL
BUN SERPL-MCNC: 11 MG/DL
CALCIUM SERPL-MCNC: 9.5 MG/DL
CHLORIDE SERPL-SCNC: 103 MMOL/L
CHOLEST SERPL-MCNC: 124 MG/DL
CO2 SERPL-SCNC: 28 MMOL/L
CREAT SERPL-MCNC: 0.5 MG/DL
CREAT SPEC-SCNC: 231 MG/DL
EGFR: 116 ML/MIN/1.73M2
ESTIMATED AVERAGE GLUCOSE: 108 MG/DL
GLUCOSE SERPL-MCNC: 106 MG/DL
HBA1C MFR BLD HPLC: 5.4 %
HCT VFR BLD CALC: 40.9 %
HDLC SERPL-MCNC: 63 MG/DL
HGB BLD-MCNC: 13.9 G/DL
LDLC SERPL CALC-MCNC: 52 MG/DL
MCHC RBC-ENTMCNC: 31.3 PG
MCHC RBC-ENTMCNC: 34 GM/DL
MCV RBC AUTO: 92.1 FL
MICROALBUMIN 24H UR DL<=1MG/L-MCNC: 2 MG/DL
MICROALBUMIN/CREAT 24H UR-RTO: 9 MG/G
NONHDLC SERPL-MCNC: 61 MG/DL
PLATELET # BLD AUTO: 232 K/UL
POTASSIUM SERPL-SCNC: 3.9 MMOL/L
PROT SERPL-MCNC: 7 G/DL
RBC # BLD: 4.44 M/UL
RBC # FLD: 12.8 %
SODIUM SERPL-SCNC: 141 MMOL/L
TRIGL SERPL-MCNC: 32 MG/DL
WBC # FLD AUTO: 6.37 K/UL

## 2023-12-23 RX ORDER — METFORMIN ER 500 MG 500 MG/1
500 TABLET ORAL DAILY
Qty: 90 | Refills: 3 | Status: DISCONTINUED | COMMUNITY
Start: 2021-12-27 | End: 2023-12-23

## 2024-02-13 ENCOUNTER — APPOINTMENT (OUTPATIENT)
Dept: CARDIOLOGY | Facility: CLINIC | Age: 49
End: 2024-02-13

## 2024-03-07 ENCOUNTER — APPOINTMENT (OUTPATIENT)
Dept: CARDIOLOGY | Facility: CLINIC | Age: 49
End: 2024-03-07
Payer: COMMERCIAL

## 2024-03-07 PROCEDURE — 93015 CV STRESS TEST SUPVJ I&R: CPT

## 2024-04-24 RX ORDER — LOSARTAN POTASSIUM 50 MG/1
50 TABLET, FILM COATED ORAL
Qty: 90 | Refills: 1 | Status: ACTIVE | COMMUNITY
Start: 2023-10-16 | End: 1900-01-01

## 2024-05-13 ENCOUNTER — NON-APPOINTMENT (OUTPATIENT)
Age: 49
End: 2024-05-13

## 2024-05-13 ENCOUNTER — APPOINTMENT (OUTPATIENT)
Dept: CARDIOLOGY | Facility: CLINIC | Age: 49
End: 2024-05-13
Payer: COMMERCIAL

## 2024-05-13 VITALS
DIASTOLIC BLOOD PRESSURE: 77 MMHG | OXYGEN SATURATION: 96 % | RESPIRATION RATE: 12 BRPM | SYSTOLIC BLOOD PRESSURE: 122 MMHG | HEART RATE: 79 BPM | BODY MASS INDEX: 24.41 KG/M2 | HEIGHT: 64 IN | TEMPERATURE: 96.8 F | WEIGHT: 143 LBS

## 2024-05-13 DIAGNOSIS — I34.1 NONRHEUMATIC MITRAL (VALVE) PROLAPSE: ICD-10-CM

## 2024-05-13 DIAGNOSIS — I10 ESSENTIAL (PRIMARY) HYPERTENSION: ICD-10-CM

## 2024-05-13 PROCEDURE — 93000 ELECTROCARDIOGRAM COMPLETE: CPT

## 2024-05-13 PROCEDURE — 99214 OFFICE O/P EST MOD 30 MIN: CPT | Mod: 25

## 2024-05-13 NOTE — HISTORY OF PRESENT ILLNESS
[FreeTextEntry1] : Aries is trying to lose weight and walking more for exercise. No CP, palpitations or SOB.

## 2024-05-13 NOTE — REVIEW OF SYSTEMS
[Chest Discomfort] : chest discomfort [Negative] : Heme/Lymph [SOB] : no shortness of breath [Dyspnea on exertion] : not dyspnea during exertion [Lower Ext Edema] : no extremity edema [Palpitations] : no palpitations

## 2024-05-13 NOTE — DISCUSSION/SUMMARY
[FreeTextEntry1] : The patient is a 48-year-old female DM, HTN, HLD who is improving.  #1 CV- normal ECG, ECHO normal and exercise stress test deconditioned #2 HTN- c/w losartan 25mg #3 HLD- c/w atorvastatin #4 DM- on ozempic, losing weight  [EKG obtained to assist in diagnosis and management of assessed problem(s)] : EKG obtained to assist in diagnosis and management of assessed problem(s)

## 2024-06-18 ENCOUNTER — LABORATORY RESULT (OUTPATIENT)
Age: 49
End: 2024-06-18

## 2024-06-18 ENCOUNTER — APPOINTMENT (OUTPATIENT)
Dept: INTERNAL MEDICINE | Facility: CLINIC | Age: 49
End: 2024-06-18
Payer: COMMERCIAL

## 2024-06-18 VITALS
DIASTOLIC BLOOD PRESSURE: 82 MMHG | BODY MASS INDEX: 24.59 KG/M2 | HEIGHT: 64 IN | WEIGHT: 144 LBS | TEMPERATURE: 97.8 F | HEART RATE: 80 BPM | OXYGEN SATURATION: 99 % | SYSTOLIC BLOOD PRESSURE: 138 MMHG

## 2024-06-18 DIAGNOSIS — E11.9 TYPE 2 DIABETES MELLITUS W/OUT COMPLICATIONS: ICD-10-CM

## 2024-06-18 DIAGNOSIS — M53.3 SACROCOCCYGEAL DISORDERS, NOT ELSEWHERE CLASSIFIED: ICD-10-CM

## 2024-06-18 DIAGNOSIS — E78.5 HYPERLIPIDEMIA, UNSPECIFIED: ICD-10-CM

## 2024-06-18 DIAGNOSIS — E53.8 DEFICIENCY OF OTHER SPECIFIED B GROUP VITAMINS: ICD-10-CM

## 2024-06-18 PROCEDURE — 99214 OFFICE O/P EST MOD 30 MIN: CPT | Mod: 25

## 2024-06-18 PROCEDURE — 36415 COLL VENOUS BLD VENIPUNCTURE: CPT

## 2024-06-18 RX ORDER — MELOXICAM 15 MG/1
15 TABLET ORAL
Qty: 1 | Refills: 3 | Status: ACTIVE | COMMUNITY
Start: 2024-06-18 | End: 1900-01-01

## 2024-06-18 NOTE — ASSESSMENT
[FreeTextEntry1] : Annual Physical Exam: DM, HLD, B12 deficiency, coccydynia - BP is stable. Continue current management. - Check A1c, lipid panels, vitamin levels, urine analysis, TSH.  - Rx Meloxicam 15mg - Xray sacrum and coccyx. - OTC donut pillow to alleviate tailbone.  - Discussed Cologuard for colon cancer screening.  - RTO annually or as needed.   Pt verbalized understanding and will reach should any questions/concerns occur.

## 2024-06-18 NOTE — PHYSICAL EXAM
[No Acute Distress] : no acute distress [Well Nourished] : well nourished [Well Developed] : well developed [Well-Appearing] : well-appearing [Normal Sclera/Conjunctiva] : normal sclera/conjunctiva [PERRL] : pupils equal round and reactive to light [EOMI] : extraocular movements intact [Normal Outer Ear/Nose] : the outer ears and nose were normal in appearance [Normal Oropharynx] : the oropharynx was normal [No JVD] : no jugular venous distention [No Lymphadenopathy] : no lymphadenopathy [Supple] : supple [Thyroid Normal, No Nodules] : the thyroid was normal and there were no nodules present [No Respiratory Distress] : no respiratory distress  [No Accessory Muscle Use] : no accessory muscle use [Clear to Auscultation] : lungs were clear to auscultation bilaterally [Normal Rate] : normal rate  [Regular Rhythm] : with a regular rhythm [Normal S1, S2] : normal S1 and S2 [No Murmur] : no murmur heard [No Carotid Bruits] : no carotid bruits [No Abdominal Bruit] : a ~M bruit was not heard ~T in the abdomen [No Varicosities] : no varicosities [Pedal Pulses Present] : the pedal pulses are present [No Edema] : there was no peripheral edema [No Palpable Aorta] : no palpable aorta [No Extremity Clubbing/Cyanosis] : no extremity clubbing/cyanosis [Soft] : abdomen soft [Non Tender] : non-tender [Non-distended] : non-distended [No Masses] : no abdominal mass palpated [No HSM] : no HSM [Normal Bowel Sounds] : normal bowel sounds [Normal Posterior Cervical Nodes] : no posterior cervical lymphadenopathy [Normal Anterior Cervical Nodes] : no anterior cervical lymphadenopathy [No CVA Tenderness] : no CVA  tenderness [No Spinal Tenderness] : no spinal tenderness [No Joint Swelling] : no joint swelling [Grossly Normal Strength/Tone] : grossly normal strength/tone [No Rash] : no rash [Coordination Grossly Intact] : coordination grossly intact [No Focal Deficits] : no focal deficits [Normal Gait] : normal gait [Deep Tendon Reflexes (DTR)] : deep tendon reflexes were 2+ and symmetric [Normal Affect] : the affect was normal [Normal Insight/Judgement] : insight and judgment were intact [de-identified] : tenderness over coccyx on palpation

## 2024-06-18 NOTE — ADDENDUM
[FreeTextEntry1] : I, Pat Tavera, acted as a scribe on behalf of Dr. Harman Singleton MD, on 06/18/2024.   All medical entries made by the scribe were at my, Dr. Harman Singleton MD, direction and personally dictated by me on 06/18/2024. I have reviewed the chart and agree that the record accurately reflects my personal performance of the history, physical exam, assessment and plan. I have also personally directed, reviewed, and agreed with the chart.

## 2024-06-18 NOTE — HISTORY OF PRESENT ILLNESS
[FreeTextEntry1] : Pt presents for an annual physical exam.  [de-identified] : Pt is a 48 yr old female present today for an annual physical exam.   Patient is doing well overall. 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 Pt denies weakness in legs a/w tailbone pain.  Weight is stable, sugar is good, on Ozempic.  Pt reports getting B12 shots yet not taking supplements. GYN, will schedule. Colonoscopy, will schedule. Denies any CP, chest tightness or SOB. Denies any abdominal pain, urinary symptom, or change in bowel habits. Denies any fever, chills, or night sweats.

## 2024-06-18 NOTE — HEALTH RISK ASSESSMENT
[Good] : ~his/her~  mood as  good [No] : In the past 12 months have you used drugs other than those required for medical reasons? No [Never] : Never [NO] : No [FreeTextEntry1] : health maintenance [de-identified] : CARD, ENDO

## 2024-06-26 LAB
25(OH)D3 SERPL-MCNC: 19.8 NG/ML
ALBUMIN SERPL ELPH-MCNC: 4.6 G/DL
ALP BLD-CCNC: 62 U/L
ALT SERPL-CCNC: 26 U/L
ANION GAP SERPL CALC-SCNC: 11 MMOL/L
APPEARANCE: CLEAR
AST SERPL-CCNC: 20 U/L
BASOPHILS # BLD AUTO: 0.04 K/UL
BASOPHILS NFR BLD AUTO: 0.7 %
BILIRUB SERPL-MCNC: 0.6 MG/DL
BILIRUBIN URINE: NEGATIVE
BLOOD URINE: NEGATIVE
BUN SERPL-MCNC: 15 MG/DL
CALCIUM SERPL-MCNC: 10.3 MG/DL
CHLORIDE SERPL-SCNC: 103 MMOL/L
CHOLEST SERPL-MCNC: 142 MG/DL
CO2 SERPL-SCNC: 28 MMOL/L
COLOR: YELLOW
CREAT SERPL-MCNC: 0.59 MG/DL
EGFR: 111 ML/MIN/1.73M2
EOSINOPHIL # BLD AUTO: 0.11 K/UL
EOSINOPHIL NFR BLD AUTO: 1.8 %
ESTIMATED AVERAGE GLUCOSE: 103 MG/DL
GLUCOSE QUALITATIVE U: NEGATIVE MG/DL
GLUCOSE SERPL-MCNC: 105 MG/DL
HBA1C MFR BLD HPLC: 5.2 %
HCT VFR BLD CALC: 38.9 %
HDLC SERPL-MCNC: 63 MG/DL
HGB BLD-MCNC: 12.8 G/DL
IMM GRANULOCYTES NFR BLD AUTO: 0.3 %
KETONES URINE: NEGATIVE MG/DL
LDLC SERPL CALC-MCNC: 68 MG/DL
LEUKOCYTE ESTERASE URINE: NEGATIVE
LYMPHOCYTES # BLD AUTO: 1.87 K/UL
LYMPHOCYTES NFR BLD AUTO: 30.7 %
MAN DIFF?: NORMAL
MCHC RBC-ENTMCNC: 30.3 PG
MCHC RBC-ENTMCNC: 32.9 GM/DL
MCV RBC AUTO: 92 FL
MONOCYTES # BLD AUTO: 0.59 K/UL
MONOCYTES NFR BLD AUTO: 9.7 %
NEUTROPHILS # BLD AUTO: 3.47 K/UL
NEUTROPHILS NFR BLD AUTO: 56.8 %
NITRITE URINE: NEGATIVE
NONHDLC SERPL-MCNC: 80 MG/DL
PH URINE: 5.5
PLATELET # BLD AUTO: 240 K/UL
POTASSIUM SERPL-SCNC: 4.3 MMOL/L
PROT SERPL-MCNC: 7.2 G/DL
PROTEIN URINE: 30 MG/DL
RBC # BLD: 4.23 M/UL
RBC # FLD: 13.5 %
SODIUM SERPL-SCNC: 142 MMOL/L
SPECIFIC GRAVITY URINE: 1.03
TRIGL SERPL-MCNC: 50 MG/DL
TSH SERPL-ACNC: 1.82 UIU/ML
UROBILINOGEN URINE: 1 MG/DL
VIT B12 SERPL-MCNC: <150 PG/ML
WBC # FLD AUTO: 6.1 K/UL

## 2024-07-02 ENCOUNTER — APPOINTMENT (OUTPATIENT)
Dept: ENDOCRINOLOGY | Facility: CLINIC | Age: 49
End: 2024-07-02
Payer: COMMERCIAL

## 2024-07-02 VITALS
HEART RATE: 72 BPM | HEIGHT: 64 IN | WEIGHT: 144 LBS | RESPIRATION RATE: 12 BRPM | BODY MASS INDEX: 24.59 KG/M2 | OXYGEN SATURATION: 98 % | DIASTOLIC BLOOD PRESSURE: 85 MMHG | SYSTOLIC BLOOD PRESSURE: 138 MMHG

## 2024-07-02 DIAGNOSIS — E11.9 TYPE 2 DIABETES MELLITUS W/OUT COMPLICATIONS: ICD-10-CM

## 2024-07-02 PROCEDURE — 95251 CONT GLUC MNTR ANALYSIS I&R: CPT

## 2024-07-02 PROCEDURE — 99212 OFFICE O/P EST SF 10 MIN: CPT | Mod: 25

## 2024-07-30 ENCOUNTER — APPOINTMENT (OUTPATIENT)
Dept: INTERNAL MEDICINE | Facility: CLINIC | Age: 49
End: 2024-07-30
Payer: COMMERCIAL

## 2024-07-30 DIAGNOSIS — E53.8 DEFICIENCY OF OTHER SPECIFIED B GROUP VITAMINS: ICD-10-CM

## 2024-07-30 PROCEDURE — 96372 THER/PROPH/DIAG INJ SC/IM: CPT

## 2024-07-30 RX ORDER — CYANOCOBALAMIN 1000 UG/ML
1000 INJECTION INTRAMUSCULAR; SUBCUTANEOUS
Qty: 0 | Refills: 0 | Status: COMPLETED | OUTPATIENT
Start: 2024-07-30

## 2024-07-30 RX ADMIN — CYANOCOBALAMIN 0 MCG/ML: 1000 INJECTION, SOLUTION INTRAMUSCULAR at 00:00

## 2024-08-06 ENCOUNTER — APPOINTMENT (OUTPATIENT)
Dept: INTERNAL MEDICINE | Facility: CLINIC | Age: 49
End: 2024-08-06

## 2024-08-06 PROCEDURE — 99212 OFFICE O/P EST SF 10 MIN: CPT | Mod: 25

## 2024-08-06 PROCEDURE — 96372 THER/PROPH/DIAG INJ SC/IM: CPT

## 2024-08-06 NOTE — PHYSICAL EXAM
[No Acute Distress] : no acute distress [Well Nourished] : well nourished [Well Developed] : well developed [Well-Appearing] : well-appearing [Normal Sclera/Conjunctiva] : normal sclera/conjunctiva [PERRL] : pupils equal round and reactive to light [EOMI] : extraocular movements intact [Normal Outer Ear/Nose] : the outer ears and nose were normal in appearance [Normal Oropharynx] : the oropharynx was normal [No JVD] : no jugular venous distention [No Lymphadenopathy] : no lymphadenopathy [Supple] : supple [Thyroid Normal, No Nodules] : the thyroid was normal and there were no nodules present [No Respiratory Distress] : no respiratory distress  [No Accessory Muscle Use] : no accessory muscle use [Clear to Auscultation] : lungs were clear to auscultation bilaterally [Normal Rate] : normal rate  [Regular Rhythm] : with a regular rhythm [Normal S1, S2] : normal S1 and S2 [No Murmur] : no murmur heard [No Carotid Bruits] : no carotid bruits [No Abdominal Bruit] : a ~M bruit was not heard ~T in the abdomen [No Varicosities] : no varicosities [Pedal Pulses Present] : the pedal pulses are present [No Edema] : there was no peripheral edema [No Palpable Aorta] : no palpable aorta [No Extremity Clubbing/Cyanosis] : no extremity clubbing/cyanosis [Soft] : abdomen soft [Non Tender] : non-tender [Non-distended] : non-distended [No Masses] : no abdominal mass palpated [No HSM] : no HSM [Normal Bowel Sounds] : normal bowel sounds [Normal Posterior Cervical Nodes] : no posterior cervical lymphadenopathy [Normal Anterior Cervical Nodes] : no anterior cervical lymphadenopathy [No CVA Tenderness] : no CVA  tenderness [No Spinal Tenderness] : no spinal tenderness [No Joint Swelling] : no joint swelling [Grossly Normal Strength/Tone] : grossly normal strength/tone [No Rash] : no rash [Coordination Grossly Intact] : coordination grossly intact [No Focal Deficits] : no focal deficits [Normal Gait] : normal gait [Deep Tendon Reflexes (DTR)] : deep tendon reflexes were 2+ and symmetric [Normal Affect] : the affect was normal [Normal Insight/Judgement] : insight and judgment were intact [de-identified] : breast exam, stable

## 2024-08-06 NOTE — HEALTH RISK ASSESSMENT
[Good] : ~his/her~  mood as  good [No] : In the past 12 months have you used drugs other than those required for medical reasons? No [Never] : Never [FreeTextEntry1] : health maintenance [de-identified] : ENDO, CARD [NO] : No [MammogramComments] : will schedule

## 2024-08-06 NOTE — HISTORY OF PRESENT ILLNESS
[FreeTextEntry1] : Patient presents for a comprehensive annual physical exam.  [de-identified] : Patient is a 48 yr old female present today for a comprehensive annual physical exam.   Patient is doing well overall and has not gotten sick since last visit. Completed Xray sacrum+coccyx, pending results from [address]. Reports pain is still appreciated even after taking pain meds. Patient saw Dr. Gasca last month, no longer diabetic, discussed can lower Ozempic, reports no side effects. Followed by Dr. Rider, stress test stable.  Mammo, requests script. Discussed Cologuard.  Denies any CP, chest tightness or SOB. Denies any abdominal pain, urinary symptom, or change in bowel habits. Denies any fever, chills, or night sweats.

## 2024-08-06 NOTE — HEALTH RISK ASSESSMENT
[Good] : ~his/her~  mood as  good [No] : In the past 12 months have you used drugs other than those required for medical reasons? No [Never] : Never [FreeTextEntry1] : health maintenance [de-identified] : ENDO, CARD [NO] : No [MammogramComments] : will schedule

## 2024-08-06 NOTE — ADDENDUM
[FreeTextEntry1] : I, Pat Tavera, acted as a scribe on behalf of Dr. Harman Singleton MD, on 08/06/2024.   All medical entries made by the scribe were at my, Dr. Harman Singleton MD, direction and personally dictated by me on 08/06/2024. I have reviewed the chart and agree that the record accurately reflects my personal performance of the history, physical exam, assessment and plan. I have also personally directed, reviewed, and agreed with the chart.

## 2024-08-06 NOTE — PHYSICAL EXAM
[No Acute Distress] : no acute distress [Well Nourished] : well nourished [Well Developed] : well developed [Well-Appearing] : well-appearing [Normal Sclera/Conjunctiva] : normal sclera/conjunctiva [PERRL] : pupils equal round and reactive to light [EOMI] : extraocular movements intact [Normal Outer Ear/Nose] : the outer ears and nose were normal in appearance [Normal Oropharynx] : the oropharynx was normal [No JVD] : no jugular venous distention [No Lymphadenopathy] : no lymphadenopathy [Supple] : supple [Thyroid Normal, No Nodules] : the thyroid was normal and there were no nodules present [No Respiratory Distress] : no respiratory distress  [No Accessory Muscle Use] : no accessory muscle use [Clear to Auscultation] : lungs were clear to auscultation bilaterally [Normal Rate] : normal rate  [Regular Rhythm] : with a regular rhythm [Normal S1, S2] : normal S1 and S2 [No Murmur] : no murmur heard [No Carotid Bruits] : no carotid bruits [No Abdominal Bruit] : a ~M bruit was not heard ~T in the abdomen [No Varicosities] : no varicosities [Pedal Pulses Present] : the pedal pulses are present [No Edema] : there was no peripheral edema [No Palpable Aorta] : no palpable aorta [No Extremity Clubbing/Cyanosis] : no extremity clubbing/cyanosis [Soft] : abdomen soft [Non Tender] : non-tender [Non-distended] : non-distended [No Masses] : no abdominal mass palpated [No HSM] : no HSM [Normal Bowel Sounds] : normal bowel sounds [Normal Posterior Cervical Nodes] : no posterior cervical lymphadenopathy [Normal Anterior Cervical Nodes] : no anterior cervical lymphadenopathy [No CVA Tenderness] : no CVA  tenderness [No Spinal Tenderness] : no spinal tenderness [No Joint Swelling] : no joint swelling [Grossly Normal Strength/Tone] : grossly normal strength/tone [No Rash] : no rash [Coordination Grossly Intact] : coordination grossly intact [No Focal Deficits] : no focal deficits [Normal Gait] : normal gait [Deep Tendon Reflexes (DTR)] : deep tendon reflexes were 2+ and symmetric [Normal Affect] : the affect was normal [Normal Insight/Judgement] : insight and judgment were intact [de-identified] : breast exam, stable

## 2024-08-06 NOTE — HISTORY OF PRESENT ILLNESS
[FreeTextEntry1] : Patient presents for a comprehensive annual physical exam.  [de-identified] : Patient is a 48 yr old female present today for a comprehensive annual physical exam.   Patient is doing well overall and has not gotten sick since last visit. Completed Xray sacrum+coccyx, pending results from [address]. Reports pain is still appreciated even after taking pain meds. Patient saw Dr. Gasca last month, no longer diabetic, discussed can lower Ozempic, reports no side effects. Followed by Dr. Rider, stress test stable.  Mammo, requests script. Discussed Cologuard.  Denies any CP, chest tightness or SOB. Denies any abdominal pain, urinary symptom, or change in bowel habits. Denies any fever, chills, or night sweats.

## 2024-08-12 ENCOUNTER — NON-APPOINTMENT (OUTPATIENT)
Age: 49
End: 2024-08-12

## 2024-08-13 ENCOUNTER — MED ADMIN CHARGE (OUTPATIENT)
Age: 49
End: 2024-08-13

## 2024-08-13 ENCOUNTER — APPOINTMENT (OUTPATIENT)
Dept: INTERNAL MEDICINE | Facility: CLINIC | Age: 49
End: 2024-08-13
Payer: COMMERCIAL

## 2024-08-13 PROCEDURE — 96372 THER/PROPH/DIAG INJ SC/IM: CPT

## 2024-08-13 RX ORDER — CYANOCOBALAMIN 1000 UG/ML
1000 INJECTION INTRAMUSCULAR; SUBCUTANEOUS
Qty: 0 | Refills: 0 | Status: COMPLETED | OUTPATIENT
Start: 2024-08-13

## 2024-08-20 ENCOUNTER — APPOINTMENT (OUTPATIENT)
Dept: INTERNAL MEDICINE | Facility: CLINIC | Age: 49
End: 2024-08-20
Payer: COMMERCIAL

## 2024-08-20 DIAGNOSIS — E53.8 DEFICIENCY OF OTHER SPECIFIED B GROUP VITAMINS: ICD-10-CM

## 2024-08-20 PROCEDURE — 96372 THER/PROPH/DIAG INJ SC/IM: CPT

## 2024-08-20 RX ORDER — CYANOCOBALAMIN 1000 UG/ML
1000 INJECTION INTRAMUSCULAR; SUBCUTANEOUS
Qty: 0 | Refills: 0 | Status: COMPLETED | OUTPATIENT
Start: 2024-08-20

## 2024-08-20 RX ADMIN — CYANOCOBALAMIN 0 MCG/ML: 1000 INJECTION, SOLUTION INTRAMUSCULAR at 00:00

## 2024-08-30 ENCOUNTER — OUTPATIENT (OUTPATIENT)
Dept: OUTPATIENT SERVICES | Facility: HOSPITAL | Age: 49
LOS: 1 days | End: 2024-08-30
Payer: COMMERCIAL

## 2024-08-30 ENCOUNTER — APPOINTMENT (OUTPATIENT)
Dept: MRI IMAGING | Facility: CLINIC | Age: 49
End: 2024-08-30

## 2024-08-30 DIAGNOSIS — Z90.711 ACQUIRED ABSENCE OF UTERUS WITH REMAINING CERVICAL STUMP: Chronic | ICD-10-CM

## 2024-08-30 DIAGNOSIS — M53.3 SACROCOCCYGEAL DISORDERS, NOT ELSEWHERE CLASSIFIED: ICD-10-CM

## 2024-08-30 DIAGNOSIS — Z98.890 OTHER SPECIFIED POSTPROCEDURAL STATES: Chronic | ICD-10-CM

## 2024-08-30 PROCEDURE — 72195 MRI PELVIS W/O DYE: CPT | Mod: 26

## 2024-08-30 PROCEDURE — 72195 MRI PELVIS W/O DYE: CPT

## 2024-09-20 ENCOUNTER — NON-APPOINTMENT (OUTPATIENT)
Age: 49
End: 2024-09-20

## 2024-09-22 ENCOUNTER — NON-APPOINTMENT (OUTPATIENT)
Age: 49
End: 2024-09-22

## 2024-09-23 ENCOUNTER — APPOINTMENT (OUTPATIENT)
Dept: NEUROSURGERY | Facility: CLINIC | Age: 49
End: 2024-09-23
Payer: COMMERCIAL

## 2024-09-23 VITALS
SYSTOLIC BLOOD PRESSURE: 128 MMHG | WEIGHT: 148 LBS | BODY MASS INDEX: 25.27 KG/M2 | OXYGEN SATURATION: 100 % | HEART RATE: 78 BPM | DIASTOLIC BLOOD PRESSURE: 88 MMHG | HEIGHT: 64 IN

## 2024-09-23 DIAGNOSIS — D49.2 NEOPLASM OF UNSPECIFIED BEHAVIOR OF BONE, SOFT TISSUE, AND SKIN: ICD-10-CM

## 2024-09-23 DIAGNOSIS — M53.3 SACROCOCCYGEAL DISORDERS, NOT ELSEWHERE CLASSIFIED: ICD-10-CM

## 2024-09-23 DIAGNOSIS — Z78.9 OTHER SPECIFIED HEALTH STATUS: ICD-10-CM

## 2024-09-23 PROCEDURE — 99205 OFFICE O/P NEW HI 60 MIN: CPT

## 2024-09-23 NOTE — HISTORY OF PRESENT ILLNESS
[> 3 months] : more  than 3 months [FreeTextEntry1] : "pain at the end of tail bone" [de-identified] : Ina Walker is a 48-year-old female who presents with c/o pain at the end of her tail bone that has been present for a few months. Pt reports subjective feeling of a lump in her tail bone.  Pain is increased with sitting and radiates to the right hip. Reports that she has a slight limp from right hip pain.  Denies bowel and bladder issues, numbness in the perineum, numbness, tingling and weakness in the extremities.

## 2024-09-23 NOTE — PHYSICAL EXAM
[General Appearance - Alert] : alert [General Appearance - Well Nourished] : well nourished [Oriented To Time, Place, And Person] : oriented to person, place, and time [General Appearance - Well-Appearing] : healthy appearing [Person] : oriented to person [Place] : oriented to place [Time] : oriented to time [Motor Tone] : muscle tone was normal in all four extremities [Straight-Leg Raise Test - Right] : straight leg raise of the right leg was positive [Sclera] : the sclera and conjunctiva were normal [Outer Ear] : the ears and nose were normal in appearance [Neck Appearance] : the appearance of the neck was normal [] : no respiratory distress [Respiration, Rhythm And Depth] : normal respiratory rhythm and effort [Exaggerated Use Of Accessory Muscles For Inspiration] : no accessory muscle use [Heart Rate And Rhythm] : heart rate was normal and rhythm regular [Abnormal Walk] : normal gait [Involuntary Movements] : no involuntary movements were seen [Skin Color & Pigmentation] : normal skin color and pigmentation [Straight-Leg Raise Test - Left] : straight leg raise of the left leg was negative

## 2024-09-23 NOTE — ASSESSMENT
[FreeTextEntry1] : IMPRESSION: Ina Walker is a 48-year-old female who presents with c/o pain at the end of her tail bone that has been present for a few months. Pt reports subjective feeling of a lump in her tail bone.  Pain is increased with sitting and radiates to the right hip   PLAN: 1. Will coordinate biopsy of sacral mass with Dr. davis. 2. Will discuss treatment options after pathology is identified. 3. Advised patient to follow up with orthopedic provider for evaluation of right hip pain. 4. CT CAP to r/o metastasis.

## 2024-09-23 NOTE — REASON FOR VISIT
[New Patient Visit] : a new patient visit [Spouse] : spouse [Referred By: _________] : Patient was referred by JOVANNA

## 2024-09-23 NOTE — HISTORY OF PRESENT ILLNESS
[> 3 months] : more  than 3 months [FreeTextEntry1] : "pain at the end of tail bone" [de-identified] : Ina Walker is a 48-year-old female who presents with c/o pain at the end of her tail bone that has been present for a few months. Pt reports subjective feeling of a lump in her tail bone.  Pain is increased with sitting and radiates to the right hip. Reports that she has a slight limp from right hip pain.  Denies bowel and bladder issues, numbness in the perineum, numbness, tingling and weakness in the extremities.

## 2024-09-23 NOTE — PHYSICAL EXAM
[General Appearance - Alert] : alert [General Appearance - Well Nourished] : well nourished [Oriented To Time, Place, And Person] : oriented to person, place, and time [General Appearance - Well-Appearing] : healthy appearing [Person] : oriented to person [Place] : oriented to place [Time] : oriented to time [Motor Tone] : muscle tone was normal in all four extremities [Straight-Leg Raise Test - Right] : straight leg raise of the right leg was positive [Sclera] : the sclera and conjunctiva were normal [Outer Ear] : the ears and nose were normal in appearance [Neck Appearance] : the appearance of the neck was normal [] : no respiratory distress [Respiration, Rhythm And Depth] : normal respiratory rhythm and effort [Heart Rate And Rhythm] : heart rate was normal and rhythm regular [Exaggerated Use Of Accessory Muscles For Inspiration] : no accessory muscle use [Abnormal Walk] : normal gait [Involuntary Movements] : no involuntary movements were seen [Skin Color & Pigmentation] : normal skin color and pigmentation [Straight-Leg Raise Test - Left] : straight leg raise of the left leg was negative

## 2024-09-23 NOTE — HISTORY OF PRESENT ILLNESS
[> 3 months] : more  than 3 months [FreeTextEntry1] : "pain at the end of tail bone" [de-identified] : Ina Walker is a 48-year-old female who presents with c/o pain at the end of her tail bone that has been present for a few months. Pt reports subjective feeling of a lump in her tail bone.  Pain is increased with sitting and radiates to the right hip. Reports that she has a slight limp from right hip pain.  Denies bowel and bladder issues, numbness in the perineum, numbness, tingling and weakness in the extremities.

## 2024-10-03 ENCOUNTER — RX RENEWAL (OUTPATIENT)
Age: 49
End: 2024-10-03

## 2024-10-04 ENCOUNTER — OUTPATIENT (OUTPATIENT)
Dept: OUTPATIENT SERVICES | Facility: HOSPITAL | Age: 49
LOS: 1 days | End: 2024-10-04

## 2024-10-04 ENCOUNTER — APPOINTMENT (OUTPATIENT)
Dept: CT IMAGING | Facility: CLINIC | Age: 49
End: 2024-10-04

## 2024-10-04 DIAGNOSIS — Z90.711 ACQUIRED ABSENCE OF UTERUS WITH REMAINING CERVICAL STUMP: Chronic | ICD-10-CM

## 2024-10-04 DIAGNOSIS — D49.2 NEOPLASM OF UNSPECIFIED BEHAVIOR OF BONE, SOFT TISSUE, AND SKIN: ICD-10-CM

## 2024-10-04 DIAGNOSIS — Z98.890 OTHER SPECIFIED POSTPROCEDURAL STATES: Chronic | ICD-10-CM

## 2024-10-07 ENCOUNTER — NON-APPOINTMENT (OUTPATIENT)
Age: 49
End: 2024-10-07

## 2024-10-15 ENCOUNTER — APPOINTMENT (OUTPATIENT)
Dept: CT IMAGING | Facility: CLINIC | Age: 49
End: 2024-10-15
Payer: COMMERCIAL

## 2024-10-15 ENCOUNTER — OUTPATIENT (OUTPATIENT)
Dept: OUTPATIENT SERVICES | Facility: HOSPITAL | Age: 49
LOS: 1 days | End: 2024-10-15
Payer: COMMERCIAL

## 2024-10-15 ENCOUNTER — APPOINTMENT (OUTPATIENT)
Dept: INTERVENTIONAL RADIOLOGY/VASCULAR | Facility: CLINIC | Age: 49
End: 2024-10-15

## 2024-10-15 ENCOUNTER — APPOINTMENT (OUTPATIENT)
Dept: INTERVENTIONAL RADIOLOGY/VASCULAR | Facility: CLINIC | Age: 49
End: 2024-10-15
Payer: COMMERCIAL

## 2024-10-15 VITALS — HEIGHT: 64 IN | WEIGHT: 144 LBS | BODY MASS INDEX: 24.59 KG/M2

## 2024-10-15 DIAGNOSIS — Z90.711 ACQUIRED ABSENCE OF UTERUS WITH REMAINING CERVICAL STUMP: Chronic | ICD-10-CM

## 2024-10-15 DIAGNOSIS — D49.2 NEOPLASM OF UNSPECIFIED BEHAVIOR OF BONE, SOFT TISSUE, AND SKIN: ICD-10-CM

## 2024-10-15 DIAGNOSIS — Z98.890 OTHER SPECIFIED POSTPROCEDURAL STATES: Chronic | ICD-10-CM

## 2024-10-15 DIAGNOSIS — M53.3 SACROCOCCYGEAL DISORDERS, NOT ELSEWHERE CLASSIFIED: ICD-10-CM

## 2024-10-15 PROCEDURE — 71260 CT THORAX DX C+: CPT | Mod: 26

## 2024-10-15 PROCEDURE — 74177 CT ABD & PELVIS W/CONTRAST: CPT

## 2024-10-15 PROCEDURE — 71260 CT THORAX DX C+: CPT

## 2024-10-15 PROCEDURE — 99243 OFF/OP CNSLTJ NEW/EST LOW 30: CPT

## 2024-10-15 PROCEDURE — 74177 CT ABD & PELVIS W/CONTRAST: CPT | Mod: 26

## 2024-10-21 LAB
ANION GAP SERPL CALC-SCNC: 11 MMOL/L
BASOPHILS # BLD AUTO: 0.05 K/UL
BASOPHILS NFR BLD AUTO: 0.8 %
BUN SERPL-MCNC: 13 MG/DL
CALCIUM SERPL-MCNC: 9.9 MG/DL
CHLORIDE SERPL-SCNC: 102 MMOL/L
CO2 SERPL-SCNC: 27 MMOL/L
CREAT SERPL-MCNC: 0.54 MG/DL
EGFR: 114 ML/MIN/1.73M2
EOSINOPHIL # BLD AUTO: 0.19 K/UL
EOSINOPHIL NFR BLD AUTO: 3 %
GLUCOSE SERPL-MCNC: 101 MG/DL
HCT VFR BLD CALC: 42.2 %
HGB BLD-MCNC: 14.6 G/DL
IMM GRANULOCYTES NFR BLD AUTO: 0.2 %
LYMPHOCYTES # BLD AUTO: 1.95 K/UL
LYMPHOCYTES NFR BLD AUTO: 31 %
MAN DIFF?: NORMAL
MCHC RBC-ENTMCNC: 29.8 PG
MCHC RBC-ENTMCNC: 34.6 GM/DL
MCV RBC AUTO: 86.1 FL
MONOCYTES # BLD AUTO: 0.61 K/UL
MONOCYTES NFR BLD AUTO: 9.7 %
NEUTROPHILS # BLD AUTO: 3.48 K/UL
NEUTROPHILS NFR BLD AUTO: 55.3 %
PLATELET # BLD AUTO: 265 K/UL
POTASSIUM SERPL-SCNC: 4.6 MMOL/L
RBC # BLD: 4.9 M/UL
RBC # FLD: 12.8 %
SODIUM SERPL-SCNC: 140 MMOL/L
WBC # FLD AUTO: 6.29 K/UL

## 2024-10-22 ENCOUNTER — OUTPATIENT (OUTPATIENT)
Dept: OUTPATIENT SERVICES | Facility: HOSPITAL | Age: 49
LOS: 1 days | End: 2024-10-22
Payer: COMMERCIAL

## 2024-10-22 ENCOUNTER — RESULT REVIEW (OUTPATIENT)
Age: 49
End: 2024-10-22

## 2024-10-22 VITALS
RESPIRATION RATE: 18 BRPM | DIASTOLIC BLOOD PRESSURE: 72 MMHG | OXYGEN SATURATION: 98 % | HEART RATE: 70 BPM | SYSTOLIC BLOOD PRESSURE: 136 MMHG

## 2024-10-22 VITALS
DIASTOLIC BLOOD PRESSURE: 71 MMHG | HEART RATE: 74 BPM | OXYGEN SATURATION: 98 % | SYSTOLIC BLOOD PRESSURE: 119 MMHG | TEMPERATURE: 98 F | RESPIRATION RATE: 18 BRPM

## 2024-10-22 DIAGNOSIS — Z98.890 OTHER SPECIFIED POSTPROCEDURAL STATES: Chronic | ICD-10-CM

## 2024-10-22 DIAGNOSIS — Z90.711 ACQUIRED ABSENCE OF UTERUS WITH REMAINING CERVICAL STUMP: Chronic | ICD-10-CM

## 2024-10-22 DIAGNOSIS — M53.3 SACROCOCCYGEAL DISORDERS, NOT ELSEWHERE CLASSIFIED: ICD-10-CM

## 2024-10-22 LAB
GLUCOSE BLDC GLUCOMTR-MCNC: 110 MG/DL — HIGH (ref 70–99)
HCG UR QL: NEGATIVE — SIGNIFICANT CHANGE UP

## 2024-10-22 PROCEDURE — 62267 INTERDISCAL PERQ ASPIR DX: CPT

## 2024-10-22 PROCEDURE — 88307 TISSUE EXAM BY PATHOLOGIST: CPT | Mod: 26

## 2024-10-22 PROCEDURE — 88173 CYTOPATH EVAL FNA REPORT: CPT | Mod: 26

## 2024-10-22 PROCEDURE — 77012 CT SCAN FOR NEEDLE BIOPSY: CPT | Mod: 26

## 2024-10-22 PROCEDURE — 88341 IMHCHEM/IMCYTCHM EA ADD ANTB: CPT | Mod: 26

## 2024-10-22 PROCEDURE — 88342 IMHCHEM/IMCYTCHM 1ST ANTB: CPT | Mod: 26

## 2024-10-22 RX ORDER — CYANOCOBALAMIN (VITAMIN B-12) 1000MCG/15
0 LIQUID (ML) ORAL
Refills: 0 | DISCHARGE

## 2024-10-22 RX ORDER — LOSARTAN POTASSIUM 25 MG/1
1 TABLET ORAL
Refills: 0 | DISCHARGE

## 2024-10-22 RX ORDER — SEMAGLUTIDE 1.34 MG/ML
1 INJECTION, SOLUTION SUBCUTANEOUS
Refills: 0 | DISCHARGE

## 2024-10-23 LAB — NON-GYNECOLOGICAL CYTOLOGY STUDY: SIGNIFICANT CHANGE UP

## 2024-11-05 DIAGNOSIS — M89.9 DISORDER OF BONE, UNSPECIFIED: ICD-10-CM

## 2024-11-05 DIAGNOSIS — M53.3 SACROCOCCYGEAL DISORDERS, NOT ELSEWHERE CLASSIFIED: ICD-10-CM

## 2024-11-14 ENCOUNTER — APPOINTMENT (OUTPATIENT)
Dept: NEUROSURGERY | Facility: CLINIC | Age: 49
End: 2024-11-14
Payer: COMMERCIAL

## 2024-11-14 ENCOUNTER — APPOINTMENT (OUTPATIENT)
Dept: NEUROSURGERY | Facility: CLINIC | Age: 49
End: 2024-11-14

## 2024-11-14 DIAGNOSIS — Z78.9 OTHER SPECIFIED HEALTH STATUS: ICD-10-CM

## 2024-11-14 DIAGNOSIS — M53.3 SACROCOCCYGEAL DISORDERS, NOT ELSEWHERE CLASSIFIED: ICD-10-CM

## 2024-11-14 PROCEDURE — 99442: CPT | Mod: 93

## 2024-11-25 ENCOUNTER — LABORATORY RESULT (OUTPATIENT)
Age: 49
End: 2024-11-25

## 2024-11-26 ENCOUNTER — APPOINTMENT (OUTPATIENT)
Dept: INTERNAL MEDICINE | Facility: CLINIC | Age: 49
End: 2024-11-26

## 2024-11-26 VITALS
HEART RATE: 86 BPM | DIASTOLIC BLOOD PRESSURE: 85 MMHG | SYSTOLIC BLOOD PRESSURE: 138 MMHG | WEIGHT: 151 LBS | TEMPERATURE: 97.3 F | OXYGEN SATURATION: 98 % | HEIGHT: 64 IN | BODY MASS INDEX: 25.78 KG/M2

## 2024-11-26 DIAGNOSIS — M53.3 SACROCOCCYGEAL DISORDERS, NOT ELSEWHERE CLASSIFIED: ICD-10-CM

## 2024-11-26 DIAGNOSIS — I10 ESSENTIAL (PRIMARY) HYPERTENSION: ICD-10-CM

## 2024-11-26 DIAGNOSIS — E11.9 TYPE 2 DIABETES MELLITUS W/OUT COMPLICATIONS: ICD-10-CM

## 2024-11-26 DIAGNOSIS — E53.8 DEFICIENCY OF OTHER SPECIFIED B GROUP VITAMINS: ICD-10-CM

## 2024-11-26 LAB
HCT VFR BLD CALC: 41 %
HGB BLD-MCNC: 13.8 G/DL
MCHC RBC-ENTMCNC: 30.4 PG
MCHC RBC-ENTMCNC: 33.7 G/DL
MCV RBC AUTO: 90.3 FL
PLATELET # BLD AUTO: 241 K/UL
RBC # BLD: 4.54 M/UL
RBC # FLD: 12.7 %
WBC # FLD AUTO: 6.55 K/UL

## 2024-11-26 PROCEDURE — 96372 THER/PROPH/DIAG INJ SC/IM: CPT

## 2024-11-26 PROCEDURE — 99214 OFFICE O/P EST MOD 30 MIN: CPT | Mod: 25

## 2024-11-26 RX ORDER — CYANOCOBALAMIN 1000 UG/ML
1000 INJECTION, SOLUTION INTRAMUSCULAR; SUBCUTANEOUS
Qty: 0 | Refills: 0 | Status: COMPLETED | OUTPATIENT
Start: 2024-11-26

## 2024-11-26 RX ADMIN — CYANOCOBALAMIN 0 MCG/ML: 1000 INJECTION, SOLUTION INTRAMUSCULAR at 00:00

## 2024-12-02 LAB
25(OH)D3 SERPL-MCNC: 17.4 NG/ML
ALBUMIN SERPL ELPH-MCNC: 4.5 G/DL
ALP BLD-CCNC: 79 U/L
ALT SERPL-CCNC: 26 U/L
ANION GAP SERPL CALC-SCNC: 12 MMOL/L
AST SERPL-CCNC: 21 U/L
BASOPHILS # BLD AUTO: 0.03 K/UL
BASOPHILS NFR BLD AUTO: 0.4 %
BILIRUB SERPL-MCNC: 0.3 MG/DL
BUN SERPL-MCNC: 16 MG/DL
CALCIUM SERPL-MCNC: 9.4 MG/DL
CHLORIDE SERPL-SCNC: 104 MMOL/L
CHOLEST SERPL-MCNC: 161 MG/DL
CO2 SERPL-SCNC: 26 MMOL/L
CREAT SERPL-MCNC: 0.59 MG/DL
EGFR: 110 ML/MIN/1.73M2
EOSINOPHIL # BLD AUTO: 0.15 K/UL
EOSINOPHIL NFR BLD AUTO: 2 %
ESTIMATED AVERAGE GLUCOSE: 111 MG/DL
GLUCOSE SERPL-MCNC: 156 MG/DL
HBA1C MFR BLD HPLC: 5.5 %
HCT VFR BLD CALC: 43.5 %
HDLC SERPL-MCNC: 59 MG/DL
HGB BLD-MCNC: 14.4 G/DL
IMM GRANULOCYTES NFR BLD AUTO: 0.3 %
LDLC SERPL CALC-MCNC: 73 MG/DL
LYMPHOCYTES # BLD AUTO: 1.81 K/UL
LYMPHOCYTES NFR BLD AUTO: 24.6 %
MAN DIFF?: NORMAL
MCHC RBC-ENTMCNC: 30.9 PG
MCHC RBC-ENTMCNC: 33.1 G/DL
MCV RBC AUTO: 93.3 FL
MONOCYTES # BLD AUTO: 0.5 K/UL
MONOCYTES NFR BLD AUTO: 6.8 %
NEUTROPHILS # BLD AUTO: 4.84 K/UL
NEUTROPHILS NFR BLD AUTO: 65.9 %
NONHDLC SERPL-MCNC: 103 MG/DL
PLATELET # BLD AUTO: 269 K/UL
POTASSIUM SERPL-SCNC: 4.1 MMOL/L
PROT SERPL-MCNC: 7.2 G/DL
RBC # BLD: 4.66 M/UL
RBC # FLD: 13.1 %
SODIUM SERPL-SCNC: 143 MMOL/L
TRIGL SERPL-MCNC: 176 MG/DL
TSH SERPL-ACNC: 2.11 UIU/ML
VIT B12 SERPL-MCNC: 169 PG/ML
WBC # FLD AUTO: 7.35 K/UL

## 2024-12-05 ENCOUNTER — RESULT REVIEW (OUTPATIENT)
Age: 49
End: 2024-12-05

## 2024-12-05 ENCOUNTER — OUTPATIENT (OUTPATIENT)
Dept: OUTPATIENT SERVICES | Facility: HOSPITAL | Age: 49
LOS: 1 days | End: 2024-12-05
Payer: COMMERCIAL

## 2024-12-05 VITALS
DIASTOLIC BLOOD PRESSURE: 76 MMHG | OXYGEN SATURATION: 99 % | HEART RATE: 88 BPM | RESPIRATION RATE: 17 BRPM | SYSTOLIC BLOOD PRESSURE: 130 MMHG

## 2024-12-05 VITALS
SYSTOLIC BLOOD PRESSURE: 111 MMHG | RESPIRATION RATE: 15 BRPM | HEART RATE: 88 BPM | TEMPERATURE: 98 F | OXYGEN SATURATION: 100 % | DIASTOLIC BLOOD PRESSURE: 64 MMHG

## 2024-12-05 DIAGNOSIS — M53.3 SACROCOCCYGEAL DISORDERS, NOT ELSEWHERE CLASSIFIED: ICD-10-CM

## 2024-12-05 DIAGNOSIS — Z98.890 OTHER SPECIFIED POSTPROCEDURAL STATES: Chronic | ICD-10-CM

## 2024-12-05 DIAGNOSIS — Z90.711 ACQUIRED ABSENCE OF UTERUS WITH REMAINING CERVICAL STUMP: Chronic | ICD-10-CM

## 2024-12-05 LAB
GLUCOSE BLDC GLUCOMTR-MCNC: 87 MG/DL — SIGNIFICANT CHANGE UP (ref 70–99)
HCG UR QL: NEGATIVE — SIGNIFICANT CHANGE UP

## 2024-12-05 PROCEDURE — 77012 CT SCAN FOR NEEDLE BIOPSY: CPT | Mod: 26

## 2024-12-05 PROCEDURE — 20225 BONE BIOPSY TROCAR/NDL DEEP: CPT

## 2024-12-05 PROCEDURE — 88333 PATH CONSLTJ SURG CYTO XM 1: CPT | Mod: 26

## 2024-12-05 PROCEDURE — 88307 TISSUE EXAM BY PATHOLOGIST: CPT | Mod: 26,59

## 2024-12-05 PROCEDURE — 88341 IMHCHEM/IMCYTCHM EA ADD ANTB: CPT | Mod: 26

## 2024-12-05 PROCEDURE — 88342 IMHCHEM/IMCYTCHM 1ST ANTB: CPT | Mod: 26

## 2024-12-05 PROCEDURE — 93010 ELECTROCARDIOGRAM REPORT: CPT

## 2024-12-06 LAB — NON-GYNECOLOGICAL CYTOLOGY STUDY: SIGNIFICANT CHANGE UP

## 2024-12-16 ENCOUNTER — APPOINTMENT (OUTPATIENT)
Dept: NEUROSURGERY | Facility: CLINIC | Age: 49
End: 2024-12-16
Payer: COMMERCIAL

## 2024-12-16 DIAGNOSIS — M53.3 SACROCOCCYGEAL DISORDERS, NOT ELSEWHERE CLASSIFIED: ICD-10-CM

## 2024-12-16 PROCEDURE — 99442: CPT

## 2024-12-17 ENCOUNTER — TRANSCRIPTION ENCOUNTER (OUTPATIENT)
Age: 49
End: 2024-12-17

## 2024-12-19 ENCOUNTER — NON-APPOINTMENT (OUTPATIENT)
Age: 49
End: 2024-12-19

## 2024-12-19 ENCOUNTER — APPOINTMENT (OUTPATIENT)
Dept: NEUROSURGERY | Facility: CLINIC | Age: 49
End: 2024-12-19

## 2024-12-19 DIAGNOSIS — Z78.9 OTHER SPECIFIED HEALTH STATUS: ICD-10-CM

## 2025-01-09 ENCOUNTER — NON-APPOINTMENT (OUTPATIENT)
Age: 50
End: 2025-01-09

## 2025-01-09 ENCOUNTER — APPOINTMENT (OUTPATIENT)
Dept: NEUROSURGERY | Facility: CLINIC | Age: 50
End: 2025-01-09

## 2025-01-09 VITALS
DIASTOLIC BLOOD PRESSURE: 87 MMHG | SYSTOLIC BLOOD PRESSURE: 153 MMHG | HEART RATE: 80 BPM | OXYGEN SATURATION: 100 % | HEIGHT: 64 IN

## 2025-01-09 VITALS — HEIGHT: 64 IN | BODY MASS INDEX: 26.46 KG/M2 | WEIGHT: 155 LBS

## 2025-01-09 DIAGNOSIS — M53.3 SACROCOCCYGEAL DISORDERS, NOT ELSEWHERE CLASSIFIED: ICD-10-CM

## 2025-01-09 DIAGNOSIS — Z78.9 OTHER SPECIFIED HEALTH STATUS: ICD-10-CM

## 2025-01-09 PROCEDURE — 99215 OFFICE O/P EST HI 40 MIN: CPT

## 2025-02-10 ENCOUNTER — OUTPATIENT (OUTPATIENT)
Dept: OUTPATIENT SERVICES | Facility: HOSPITAL | Age: 50
LOS: 1 days | End: 2025-02-10
Payer: COMMERCIAL

## 2025-02-10 VITALS
RESPIRATION RATE: 20 BRPM | OXYGEN SATURATION: 98 % | HEIGHT: 64 IN | WEIGHT: 154.1 LBS | DIASTOLIC BLOOD PRESSURE: 82 MMHG | SYSTOLIC BLOOD PRESSURE: 138 MMHG | HEART RATE: 82 BPM | TEMPERATURE: 98 F

## 2025-02-10 DIAGNOSIS — Z01.818 ENCOUNTER FOR OTHER PREPROCEDURAL EXAMINATION: ICD-10-CM

## 2025-02-10 DIAGNOSIS — M53.3 SACROCOCCYGEAL DISORDERS, NOT ELSEWHERE CLASSIFIED: ICD-10-CM

## 2025-02-10 DIAGNOSIS — Z98.890 OTHER SPECIFIED POSTPROCEDURAL STATES: Chronic | ICD-10-CM

## 2025-02-10 DIAGNOSIS — Z86.39 PERSONAL HISTORY OF OTHER ENDOCRINE, NUTRITIONAL AND METABOLIC DISEASE: ICD-10-CM

## 2025-02-10 DIAGNOSIS — Z90.49 ACQUIRED ABSENCE OF OTHER SPECIFIED PARTS OF DIGESTIVE TRACT: Chronic | ICD-10-CM

## 2025-02-10 DIAGNOSIS — Z90.711 ACQUIRED ABSENCE OF UTERUS WITH REMAINING CERVICAL STUMP: Chronic | ICD-10-CM

## 2025-02-10 LAB
ANION GAP SERPL CALC-SCNC: 11 MMOL/L — SIGNIFICANT CHANGE UP (ref 5–17)
BUN SERPL-MCNC: 18 MG/DL — SIGNIFICANT CHANGE UP (ref 7–23)
CALCIUM SERPL-MCNC: 10.1 MG/DL — SIGNIFICANT CHANGE UP (ref 8.4–10.5)
CHLORIDE SERPL-SCNC: 107 MMOL/L — SIGNIFICANT CHANGE UP (ref 96–108)
CO2 SERPL-SCNC: 27 MMOL/L — SIGNIFICANT CHANGE UP (ref 22–31)
CREAT SERPL-MCNC: 0.52 MG/DL — SIGNIFICANT CHANGE UP (ref 0.5–1.3)
EGFR: 114 ML/MIN/1.73M2 — SIGNIFICANT CHANGE UP
GLUCOSE SERPL-MCNC: 102 MG/DL — HIGH (ref 70–99)
HCT VFR BLD CALC: 39.2 % — SIGNIFICANT CHANGE UP (ref 34.5–45)
HGB BLD-MCNC: 13.4 G/DL — SIGNIFICANT CHANGE UP (ref 11.5–15.5)
MCHC RBC-ENTMCNC: 30.5 PG — SIGNIFICANT CHANGE UP (ref 27–34)
MCHC RBC-ENTMCNC: 34.2 G/DL — SIGNIFICANT CHANGE UP (ref 32–36)
MCV RBC AUTO: 89.1 FL — SIGNIFICANT CHANGE UP (ref 80–100)
NRBC # BLD: 0 /100 WBCS — SIGNIFICANT CHANGE UP (ref 0–0)
NRBC BLD-RTO: 0 /100 WBCS — SIGNIFICANT CHANGE UP (ref 0–0)
PLATELET # BLD AUTO: 241 K/UL — SIGNIFICANT CHANGE UP (ref 150–400)
POTASSIUM SERPL-MCNC: 3.8 MMOL/L — SIGNIFICANT CHANGE UP (ref 3.5–5.3)
POTASSIUM SERPL-SCNC: 3.8 MMOL/L — SIGNIFICANT CHANGE UP (ref 3.5–5.3)
RBC # BLD: 4.4 M/UL — SIGNIFICANT CHANGE UP (ref 3.8–5.2)
RBC # FLD: 12.6 % — SIGNIFICANT CHANGE UP (ref 10.3–14.5)
SODIUM SERPL-SCNC: 145 MMOL/L — SIGNIFICANT CHANGE UP (ref 135–145)
WBC # BLD: 5.8 K/UL — SIGNIFICANT CHANGE UP (ref 3.8–10.5)
WBC # FLD AUTO: 5.8 K/UL — SIGNIFICANT CHANGE UP (ref 3.8–10.5)

## 2025-02-10 PROCEDURE — 80048 BASIC METABOLIC PNL TOTAL CA: CPT

## 2025-02-10 PROCEDURE — 86850 RBC ANTIBODY SCREEN: CPT

## 2025-02-10 PROCEDURE — 86900 BLOOD TYPING SEROLOGIC ABO: CPT

## 2025-02-10 PROCEDURE — 85027 COMPLETE CBC AUTOMATED: CPT

## 2025-02-10 PROCEDURE — 83036 HEMOGLOBIN GLYCOSYLATED A1C: CPT

## 2025-02-10 PROCEDURE — 87641 MR-STAPH DNA AMP PROBE: CPT

## 2025-02-10 PROCEDURE — G0463: CPT

## 2025-02-10 PROCEDURE — 87640 STAPH A DNA AMP PROBE: CPT

## 2025-02-10 PROCEDURE — 36415 COLL VENOUS BLD VENIPUNCTURE: CPT

## 2025-02-10 PROCEDURE — 86901 BLOOD TYPING SEROLOGIC RH(D): CPT

## 2025-02-10 NOTE — H&P PST ADULT - NSICDXFAMILYHX_GEN_ALL_CORE_FT
FAMILY HISTORY:  Father  Still living? Yes, Estimated age: Age Unknown  Family hx of hypertension, Age at diagnosis: Age Unknown

## 2025-02-10 NOTE — H&P PST ADULT - NSICDXPASTSURGICALHX_GEN_ALL_CORE_FT
PAST SURGICAL HISTORY:  History of cholecystectomy     S/P knee surgery     S/P partial hysterectomy

## 2025-02-10 NOTE — H&P PST ADULT - ASSESSMENT
Dasi activities: "on my feet, up and down at work," does house chores Dasi activities: "on my feet, up and down at work," does house chores  Dasi score: 6.27  Patient denies any loose or broken tooth     CAPRINI SCORE    AGE RELATED RISK FACTORS                                                             [1 ] Age 41-60 years                                            (1 Point)  [ ] Age: 61-74 years                                           (2 Points)                 [ ] Age= 75 years                                                (3 Points)             DISEASE RELATED RISK FACTORS                                                       [ ] Edema in the lower extremities                 (1 Point)                     [ ] Varicose veins                                               (1 Point)                                 [ 1] BMI > 25 Kg/m2                                            (1 Point)                                  [ ] Serious infection (ie PNA)                            (1 Point)                     [ ] Lung disease ( COPD, Emphysema)            (1 Point)                                                                          [ ] Acute myocardial infarction                         (1 Point)                  [ ] Congestive heart failure (in the previous month)  (1 Point)         [ ] Inflammatory bowel disease                            (1 Point)                  [ ] Central venous access, PICC or Port               (2 points)       (within the last month)                                                                [ ] Stroke (in the previous month)                        (5 Points)    [ ] Previous or present malignancy                       (2 points)                                                                                                                                                         HEMATOLOGY RELATED FACTORS                                                         [ ] Prior episodes of VTE                                     (3 Points)                     [ ] Positive family history for VTE                      (3 Points)                  [ ] Prothrombin 96679 A                                     (3 Points)                     [ ] Factor V Leiden                                                (3 Points)                        [ ] Lupus anticoagulants                                      (3 Points)                                                           [ ] Anticardiolipin antibodies                              (3 Points)                                                       [ ] High homocysteine in the blood                   (3 Points)                                             [ ] Other congenital or acquired thrombophilia      (3 Points)                                                [ ] Heparin induced thrombocytopenia                  (3 Points)                                        MOBILITY RELATED FACTORS  [ ] Bed rest                                                         (1 Point)  [ ] Plaster cast                                                    (2 points)  [ ] Bed bound for more than 72 hours           (2 Points)    GENDER SPECIFIC FACTORS  [ ] Pregnancy or had a baby within the last month   (1 Point)  [ ] Post-partum < 6 weeks                                   (1 Point)  [ ] Hormonal therapy  or oral contraception   (1 Point)  [ ] History of pregnancy complications              (1 point)  [ ] Unexplained or recurrent              (1 Point)    OTHER RISK FACTORS                                           (1 Point)  [ ] BMI >40, smoking, diabetes requiring insulin, chemotherapy  blood transfusions and length of surgery over 2 hours    SURGERY RELATED RISK FACTORS  [ ]  Section within the last month     (1 Point)  [ ] Minor surgery                                                  (1 Point)  [ ] Arthroscopic surgery                                       (2 Points)  [2 ] Planned major surgery lasting more            (2 Points)      than 45 minutes     [ ] Elective hip or knee joint replacement       (5 points)       surgery                                                TRAUMA RELATED RISK FACTORS  [ ] Fracture of the hip, pelvis, or leg                       (5 Points)  [ ] Spinal cord injury resulting in paralysis             (5 points)       (in the previous month)    [ ] Paralysis  (less than 1 month)                             (5 Points)  [ ] Multiple Trauma within 1 month                        (5 Points)    Total Score [4]    Caprini Score 0-2: Low Risk, NO VTE prophylaxis required for most patients, encourage ambulation  Caprini Score 3-6: Moderate Risk , pharmacologic VTE prophylaxis is indicated for most patients (in the absence of contraindications)  Caprini Score Greater than or =7: High risk, pharmocologic VTE prophylaxis indicated for most patients (in the absence of contraindications)

## 2025-02-10 NOTE — H&P PST ADULT - REASON FOR ADMISSION
"Pain in my tailbone, removed the mass, biopsy, pain months   p "I have pain on my tailbone, removing a mass, I had biopsies." "I have pain on my tailbone, removing a mass, I had biopsies."  Goal of care: "no pain, removal of mass."

## 2025-02-10 NOTE — H&P PST ADULT - PROBLEM SELECTOR PLAN 1
Plan for distal sacrectomy   preop instruction provided including used of surgical wash, verbalized understanding and teach back   labs including MRSA result pending   ERP protocol

## 2025-02-10 NOTE — H&P PST ADULT - HISTORY OF PRESENT ILLNESS
49 year old female c/o lower back/ tailbone pain since September, I have had inconclusive biopsies, presents today to [facility] for scheduled distal sacrectomy for sacrococcygeal disorders on 2/26/2025. Her medical history significant for MVP (unknown last echo), hypertension, T2DM on Ozempic, previous h/o anemia. She denies any pain at present, no change in her bowel habits.    ***Her last dose of  Ozempic was 2/3/2025.

## 2025-02-10 NOTE — H&P PST ADULT - NSICDXPASTMEDICALHX_GEN_ALL_CORE_FT
PAST MEDICAL HISTORY:  H/O mitral valve prolapse     Uterine leiomyoma, unspecified location      PAST MEDICAL HISTORY:  Disorder of sacrococcygeal spine     H/O diabetes mellitus     H/O mitral valve prolapse     Hypertension     Uterine leiomyoma, unspecified location

## 2025-02-11 LAB
A1C WITH ESTIMATED AVERAGE GLUCOSE RESULT: 5.6 % — SIGNIFICANT CHANGE UP (ref 4–5.6)
ESTIMATED AVERAGE GLUCOSE: 114 MG/DL — SIGNIFICANT CHANGE UP (ref 68–114)
MRSA PCR RESULT.: SIGNIFICANT CHANGE UP
S AUREUS DNA NOSE QL NAA+PROBE: SIGNIFICANT CHANGE UP

## 2025-02-14 ENCOUNTER — NON-APPOINTMENT (OUTPATIENT)
Age: 50
End: 2025-02-14

## 2025-02-14 ENCOUNTER — APPOINTMENT (OUTPATIENT)
Dept: INTERNAL MEDICINE | Facility: CLINIC | Age: 50
End: 2025-02-14
Payer: COMMERCIAL

## 2025-02-14 VITALS
TEMPERATURE: 97.2 F | SYSTOLIC BLOOD PRESSURE: 124 MMHG | DIASTOLIC BLOOD PRESSURE: 81 MMHG | HEART RATE: 75 BPM | BODY MASS INDEX: 26.46 KG/M2 | HEIGHT: 64 IN | WEIGHT: 155 LBS | OXYGEN SATURATION: 98 %

## 2025-02-14 DIAGNOSIS — Z01.818 ENCOUNTER FOR OTHER PREPROCEDURAL EXAMINATION: ICD-10-CM

## 2025-02-14 DIAGNOSIS — L91.8 OTHER HYPERTROPHIC DISORDERS OF THE SKIN: ICD-10-CM

## 2025-02-14 PROCEDURE — G2211 COMPLEX E/M VISIT ADD ON: CPT | Mod: NC

## 2025-02-14 PROCEDURE — 99213 OFFICE O/P EST LOW 20 MIN: CPT

## 2025-02-16 PROBLEM — Z01.818 PREOPERATIVE CLEARANCE: Status: ACTIVE | Noted: 2025-02-16

## 2025-02-19 ENCOUNTER — NON-APPOINTMENT (OUTPATIENT)
Age: 50
End: 2025-02-19

## 2025-02-24 ENCOUNTER — NON-APPOINTMENT (OUTPATIENT)
Age: 50
End: 2025-02-24

## 2025-02-26 ENCOUNTER — INPATIENT (INPATIENT)
Facility: HOSPITAL | Age: 50
LOS: 6 days | Discharge: HOME | DRG: 451 | End: 2025-03-05
Attending: NEUROLOGICAL SURGERY | Admitting: NEUROLOGICAL SURGERY
Payer: COMMERCIAL

## 2025-02-26 ENCOUNTER — APPOINTMENT (OUTPATIENT)
Dept: NEUROSURGERY | Facility: HOSPITAL | Age: 50
End: 2025-02-26

## 2025-02-26 ENCOUNTER — TRANSCRIPTION ENCOUNTER (OUTPATIENT)
Age: 50
End: 2025-02-26

## 2025-02-26 VITALS
HEIGHT: 64.02 IN | RESPIRATION RATE: 18 BRPM | OXYGEN SATURATION: 98 % | WEIGHT: 154.1 LBS | DIASTOLIC BLOOD PRESSURE: 88 MMHG | HEART RATE: 77 BPM | SYSTOLIC BLOOD PRESSURE: 153 MMHG | TEMPERATURE: 98 F

## 2025-02-26 DIAGNOSIS — Z90.711 ACQUIRED ABSENCE OF UTERUS WITH REMAINING CERVICAL STUMP: Chronic | ICD-10-CM

## 2025-02-26 DIAGNOSIS — Z90.49 ACQUIRED ABSENCE OF OTHER SPECIFIED PARTS OF DIGESTIVE TRACT: Chronic | ICD-10-CM

## 2025-02-26 DIAGNOSIS — Z98.890 OTHER SPECIFIED POSTPROCEDURAL STATES: Chronic | ICD-10-CM

## 2025-02-26 DIAGNOSIS — M53.3 SACROCOCCYGEAL DISORDERS, NOT ELSEWHERE CLASSIFIED: ICD-10-CM

## 2025-02-26 LAB
ANION GAP SERPL CALC-SCNC: 8 MMOL/L — SIGNIFICANT CHANGE UP (ref 5–17)
BASOPHILS # BLD AUTO: 0.03 K/UL — SIGNIFICANT CHANGE UP (ref 0–0.2)
BASOPHILS NFR BLD AUTO: 0.4 % — SIGNIFICANT CHANGE UP (ref 0–2)
BUN SERPL-MCNC: 11 MG/DL — SIGNIFICANT CHANGE UP (ref 7–23)
CALCIUM SERPL-MCNC: 8.8 MG/DL — SIGNIFICANT CHANGE UP (ref 8.4–10.5)
CHLORIDE SERPL-SCNC: 103 MMOL/L — SIGNIFICANT CHANGE UP (ref 96–108)
CO2 SERPL-SCNC: 24 MMOL/L — SIGNIFICANT CHANGE UP (ref 22–31)
CREAT SERPL-MCNC: 0.55 MG/DL — SIGNIFICANT CHANGE UP (ref 0.5–1.3)
EGFR: 112 ML/MIN/1.73M2 — SIGNIFICANT CHANGE UP
EGFR: 112 ML/MIN/1.73M2 — SIGNIFICANT CHANGE UP
EOSINOPHIL # BLD AUTO: 0.01 K/UL — SIGNIFICANT CHANGE UP (ref 0–0.5)
EOSINOPHIL NFR BLD AUTO: 0.1 % — SIGNIFICANT CHANGE UP (ref 0–6)
GLUCOSE BLDC GLUCOMTR-MCNC: 106 MG/DL — HIGH (ref 70–99)
GLUCOSE BLDC GLUCOMTR-MCNC: 198 MG/DL — HIGH (ref 70–99)
GLUCOSE BLDC GLUCOMTR-MCNC: 205 MG/DL — HIGH (ref 70–99)
GLUCOSE SERPL-MCNC: 221 MG/DL — HIGH (ref 70–99)
HCT VFR BLD CALC: 34.1 % — LOW (ref 34.5–45)
HGB BLD-MCNC: 11.5 G/DL — SIGNIFICANT CHANGE UP (ref 11.5–15.5)
IMM GRANULOCYTES NFR BLD AUTO: 1 % — HIGH (ref 0–0.9)
LYMPHOCYTES # BLD AUTO: 0.79 K/UL — LOW (ref 1–3.3)
LYMPHOCYTES # BLD AUTO: 10.3 % — LOW (ref 13–44)
MCHC RBC-ENTMCNC: 29.7 PG — SIGNIFICANT CHANGE UP (ref 27–34)
MCHC RBC-ENTMCNC: 33.7 G/DL — SIGNIFICANT CHANGE UP (ref 32–36)
MCV RBC AUTO: 88.1 FL — SIGNIFICANT CHANGE UP (ref 80–100)
MONOCYTES # BLD AUTO: 0.38 K/UL — SIGNIFICANT CHANGE UP (ref 0–0.9)
MONOCYTES NFR BLD AUTO: 4.9 % — SIGNIFICANT CHANGE UP (ref 2–14)
NEUTROPHILS # BLD AUTO: 6.41 K/UL — SIGNIFICANT CHANGE UP (ref 1.8–7.4)
NEUTROPHILS NFR BLD AUTO: 83.3 % — HIGH (ref 43–77)
NRBC BLD AUTO-RTO: 0 /100 WBCS — SIGNIFICANT CHANGE UP (ref 0–0)
PLATELET # BLD AUTO: 230 K/UL — SIGNIFICANT CHANGE UP (ref 150–400)
POTASSIUM SERPL-MCNC: 4.4 MMOL/L — SIGNIFICANT CHANGE UP (ref 3.5–5.3)
POTASSIUM SERPL-SCNC: 4.4 MMOL/L — SIGNIFICANT CHANGE UP (ref 3.5–5.3)
RBC # BLD: 3.87 M/UL — SIGNIFICANT CHANGE UP (ref 3.8–5.2)
RBC # FLD: 12.9 % — SIGNIFICANT CHANGE UP (ref 10.3–14.5)
SODIUM SERPL-SCNC: 135 MMOL/L — SIGNIFICANT CHANGE UP (ref 135–145)
WBC # BLD: 7.7 K/UL — SIGNIFICANT CHANGE UP (ref 3.8–10.5)
WBC # FLD AUTO: 7.7 K/UL — SIGNIFICANT CHANGE UP (ref 3.8–10.5)

## 2025-02-26 PROCEDURE — 88309 TISSUE EXAM BY PATHOLOGIST: CPT | Mod: 26

## 2025-02-26 PROCEDURE — 14301 TIS TRNFR ANY 30.1-60 SQ CM: CPT

## 2025-02-26 PROCEDURE — 88311 DECALCIFY TISSUE: CPT | Mod: 26

## 2025-02-26 PROCEDURE — 27280 ARTHR SI JT OPN B1GRF INSTRM: CPT | Mod: 50

## 2025-02-26 PROCEDURE — 99291 CRITICAL CARE FIRST HOUR: CPT

## 2025-02-26 PROCEDURE — 72020 X-RAY EXAM OF SPINE 1 VIEW: CPT | Mod: 26

## 2025-02-26 PROCEDURE — 99255 IP/OBS CONSLTJ NEW/EST HI 80: CPT | Mod: 57,GC

## 2025-02-26 PROCEDURE — 93970 EXTREMITY STUDY: CPT | Mod: 26

## 2025-02-26 PROCEDURE — 64999M: CUSTOM | Mod: 22

## 2025-02-26 PROCEDURE — 15003 WOUND PREP ADDL 100 CM: CPT

## 2025-02-26 PROCEDURE — 15738 MUSCLE-SKIN GRAFT LEG: CPT

## 2025-02-26 PROCEDURE — 15777 ACELLULAR DERM MATRIX IMPLT: CPT | Mod: 50

## 2025-02-26 PROCEDURE — 15734 MUSCLE-SKIN GRAFT TRUNK: CPT

## 2025-02-26 PROCEDURE — 14302 TIS TRNFR ADDL 30 SQ CM: CPT

## 2025-02-26 PROCEDURE — 15002 WOUND PREP TRK/ARM/LEG: CPT

## 2025-02-26 DEVICE — FLOSEAL WITH RECOTHROM THROMBIN 10ML: Type: IMPLANTABLE DEVICE | Status: FUNCTIONAL

## 2025-02-26 DEVICE — IMPLANTABLE DEVICE: Type: IMPLANTABLE DEVICE | Status: FUNCTIONAL

## 2025-02-26 DEVICE — CLIP APPLIER COVIDIEN SURGICLIP II 9.75" MEDIUM: Type: IMPLANTABLE DEVICE | Status: FUNCTIONAL

## 2025-02-26 DEVICE — SURGIFOAM 8 X 12.5CM X 10MM (100): Type: IMPLANTABLE DEVICE | Status: FUNCTIONAL

## 2025-02-26 RX ORDER — POLYETHYLENE GLYCOL 3350 17 G/17G
17 POWDER, FOR SOLUTION ORAL DAILY
Refills: 0 | Status: DISCONTINUED | OUTPATIENT
Start: 2025-02-26 | End: 2025-03-01

## 2025-02-26 RX ORDER — SODIUM CHLORIDE 9 G/1000ML
1000 INJECTION, SOLUTION INTRAVENOUS
Refills: 0 | Status: DISCONTINUED | OUTPATIENT
Start: 2025-02-26 | End: 2025-02-27

## 2025-02-26 RX ORDER — INSULIN GLARGINE-YFGN 100 [IU]/ML
5 INJECTION, SOLUTION SUBCUTANEOUS AT BEDTIME
Refills: 0 | Status: DISCONTINUED | OUTPATIENT
Start: 2025-02-26 | End: 2025-03-05

## 2025-02-26 RX ORDER — ONDANSETRON HCL/PF 4 MG/2 ML
4 VIAL (ML) INJECTION EVERY 6 HOURS
Refills: 0 | Status: DISCONTINUED | OUTPATIENT
Start: 2025-02-26 | End: 2025-02-27

## 2025-02-26 RX ORDER — DEXTROSE 50 % IN WATER 50 %
12.5 SYRINGE (ML) INTRAVENOUS ONCE
Refills: 0 | Status: DISCONTINUED | OUTPATIENT
Start: 2025-02-26 | End: 2025-02-27

## 2025-02-26 RX ORDER — NALOXONE HYDROCHLORIDE 0.4 MG/ML
0.1 INJECTION, SOLUTION INTRAMUSCULAR; INTRAVENOUS; SUBCUTANEOUS
Refills: 0 | Status: DISCONTINUED | OUTPATIENT
Start: 2025-02-26 | End: 2025-02-27

## 2025-02-26 RX ORDER — CEFAZOLIN SODIUM IN 0.9 % NACL 3 G/100 ML
2000 INTRAVENOUS SOLUTION, PIGGYBACK (ML) INTRAVENOUS ONCE
Refills: 0 | Status: COMPLETED | OUTPATIENT
Start: 2025-02-26 | End: 2025-02-26

## 2025-02-26 RX ORDER — SENNA 187 MG
2 TABLET ORAL AT BEDTIME
Refills: 0 | Status: DISCONTINUED | OUTPATIENT
Start: 2025-02-26 | End: 2025-03-05

## 2025-02-26 RX ORDER — CEFAZOLIN SODIUM IN 0.9 % NACL 3 G/100 ML
2000 INTRAVENOUS SOLUTION, PIGGYBACK (ML) INTRAVENOUS EVERY 8 HOURS
Refills: 0 | Status: COMPLETED | OUTPATIENT
Start: 2025-02-26 | End: 2025-02-27

## 2025-02-26 RX ORDER — GLUCAGON 3 MG/1
1 POWDER NASAL ONCE
Refills: 0 | Status: DISCONTINUED | OUTPATIENT
Start: 2025-02-26 | End: 2025-02-27

## 2025-02-26 RX ORDER — APREPITANT 40 MG/1
40 CAPSULE ORAL ONCE
Refills: 0 | Status: COMPLETED | OUTPATIENT
Start: 2025-02-26 | End: 2025-02-26

## 2025-02-26 RX ORDER — SEMAGLUTIDE 0.25 MG/.5ML
0 INJECTION, SOLUTION SUBCUTANEOUS
Refills: 0 | DISCHARGE

## 2025-02-26 RX ORDER — LOSARTAN POTASSIUM 100 MG/1
50 TABLET, FILM COATED ORAL DAILY
Refills: 0 | Status: DISCONTINUED | OUTPATIENT
Start: 2025-02-26 | End: 2025-03-05

## 2025-02-26 RX ORDER — ACETAMINOPHEN 500 MG/5ML
650 LIQUID (ML) ORAL EVERY 6 HOURS
Refills: 0 | Status: DISCONTINUED | OUTPATIENT
Start: 2025-02-26 | End: 2025-03-05

## 2025-02-26 RX ORDER — DEXTROSE 50 % IN WATER 50 %
15 SYRINGE (ML) INTRAVENOUS ONCE
Refills: 0 | Status: DISCONTINUED | OUTPATIENT
Start: 2025-02-26 | End: 2025-02-27

## 2025-02-26 RX ORDER — INFLUENZA A VIRUS A/IDAHO/07/2018 (H1N1) ANTIGEN (MDCK CELL DERIVED, PROPIOLACTONE INACTIVATED, INFLUENZA A VIRUS A/INDIANA/08/2018 (H3N2) ANTIGEN (MDCK CELL DERIVED, PROPIOLACTONE INACTIVATED), INFLUENZA B VIRUS B/SINGAPORE/INFTT-16-0610/2016 ANTIGEN (MDCK CELL DERIVED, PROPIOLACTONE INACTIVATED), INFLUENZA B VIRUS B/IOWA/06/2017 ANTIGEN (MDCK CELL DERIVED, PROPIOLACTONE INACTIVATED) 15; 15; 15; 15 UG/.5ML; UG/.5ML; UG/.5ML; UG/.5ML
0.5 INJECTION, SUSPENSION INTRAMUSCULAR ONCE
Refills: 0 | Status: DISCONTINUED | OUTPATIENT
Start: 2025-02-26 | End: 2025-02-26

## 2025-02-26 RX ORDER — METHOCARBAMOL 500 MG/1
500 TABLET, FILM COATED ORAL EVERY 6 HOURS
Refills: 0 | Status: DISCONTINUED | OUTPATIENT
Start: 2025-02-26 | End: 2025-02-27

## 2025-02-26 RX ORDER — INSULIN LISPRO 100 U/ML
INJECTION, SOLUTION INTRAVENOUS; SUBCUTANEOUS
Refills: 0 | Status: DISCONTINUED | OUTPATIENT
Start: 2025-02-26 | End: 2025-03-05

## 2025-02-26 RX ORDER — DEXTROSE 50 % IN WATER 50 %
25 SYRINGE (ML) INTRAVENOUS ONCE
Refills: 0 | Status: DISCONTINUED | OUTPATIENT
Start: 2025-02-26 | End: 2025-02-27

## 2025-02-26 RX ORDER — LIDOCAINE HCL/PF 10 MG/ML
0.2 VIAL (ML) INJECTION ONCE
Refills: 0 | Status: DISCONTINUED | OUTPATIENT
Start: 2025-02-26 | End: 2025-02-26

## 2025-02-26 RX ORDER — ACETAMINOPHEN 500 MG/5ML
1000 LIQUID (ML) ORAL ONCE
Refills: 0 | Status: COMPLETED | OUTPATIENT
Start: 2025-02-26 | End: 2025-02-26

## 2025-02-26 RX ORDER — HYDROMORPHONE/SOD CHLOR,ISO/PF 2 MG/10 ML
30 SYRINGE (ML) INJECTION
Refills: 0 | Status: DISCONTINUED | OUTPATIENT
Start: 2025-02-26 | End: 2025-02-27

## 2025-02-26 RX ORDER — GABAPENTIN 400 MG/1
100 CAPSULE ORAL EVERY 8 HOURS
Refills: 0 | Status: DISCONTINUED | OUTPATIENT
Start: 2025-02-26 | End: 2025-03-05

## 2025-02-26 RX ORDER — ATORVASTATIN CALCIUM 80 MG/1
10 TABLET, FILM COATED ORAL AT BEDTIME
Refills: 0 | Status: DISCONTINUED | OUTPATIENT
Start: 2025-02-26 | End: 2025-03-05

## 2025-02-26 RX ADMIN — INSULIN GLARGINE-YFGN 5 UNIT(S): 100 INJECTION, SOLUTION SUBCUTANEOUS at 21:40

## 2025-02-26 RX ADMIN — Medication 100 MILLIGRAM(S): at 20:39

## 2025-02-26 RX ADMIN — INSULIN LISPRO 4: 100 INJECTION, SOLUTION INTRAVENOUS; SUBCUTANEOUS at 17:24

## 2025-02-26 RX ADMIN — Medication 1000 MILLIGRAM(S): at 06:57

## 2025-02-26 RX ADMIN — APREPITANT 40 MILLIGRAM(S): 40 CAPSULE ORAL at 06:57

## 2025-02-26 RX ADMIN — GABAPENTIN 100 MILLIGRAM(S): 400 CAPSULE ORAL at 21:42

## 2025-02-26 RX ADMIN — INSULIN LISPRO 2: 100 INJECTION, SOLUTION INTRAVENOUS; SUBCUTANEOUS at 21:40

## 2025-02-26 RX ADMIN — Medication 30 MILLILITER(S): at 18:34

## 2025-02-26 RX ADMIN — Medication 30 MILLILITER(S): at 19:21

## 2025-02-26 RX ADMIN — ATORVASTATIN CALCIUM 10 MILLIGRAM(S): 80 TABLET, FILM COATED ORAL at 21:42

## 2025-02-26 NOTE — CONSULT NOTE ADULT - ATTENDING COMMENTS
patient undergoing urgent sacral tumor resection and now with exposed rectum - plan urgent intraop plastic surgery assistance in case and we will assist with closure

## 2025-02-26 NOTE — BRIEF OPERATIVE NOTE - NSICDXBRIEFPROCEDURE_GEN_ALL_CORE_FT
PROCEDURES:  Sacrectomy for neoplasm 10-Feb-2025 16:04:37 Sacrococcygeal disorders Tate Culp  
PROCEDURES:  Sacrectomy for neoplasm 10-Feb-2025 16:04:37 Sacrococcygeal disorders Tate Culp

## 2025-02-26 NOTE — PATIENT PROFILE ADULT - PATIENT'S SEXUAL ORIENTATION
Writer JOSE C for pt to call back and schedule a visit    Please schedule a new, in person, appt with Sherley Cox on a Wednesday    Suzette Sy  
Heterosexual

## 2025-02-26 NOTE — PROGRESS NOTE ADULT - SUBJECTIVE AND OBJECTIVE BOX
Patient seen and examined at bedside.    --Anticoagulation--    T(C): 36.6 (02-26-25 @ 16:00), Max: 36.8 (02-26-25 @ 07:04)  HR: 77 (02-26-25 @ 18:00) (70 - 88)  BP: 109/58 (02-26-25 @ 18:00) (100/58 - 153/88)  RR: 16 (02-26-25 @ 18:00) (15 - 20)  SpO2: 94% (02-26-25 @ 18:00) (94% - 100%)  Wt(kg): --    Exam:  Ox3, PERRL, BUE 5/5, BLE 5/5, SILT     Deep    Superfical    Prevena Vac

## 2025-02-26 NOTE — CONSULT NOTE ADULT - ASSESSMENT
A: 49 year old female c/o lower back/ tailbone pain since September, I have had inconclusive biopsies, presents today to [facility] for scheduled distal sacrectomy for sacrococcygeal disorders on 2/26/2025. Her medical history significant for MVP (unknown last echo), hypertension, T2DM on Ozempic, previous h/o anemia.     PRS consulted for closure of back wound.     Plan:  -Will proceed to OR with CHARLES Bhaktash  Plastic Surgery  #[phone] [facility] pager  [phone] [facility] pager  Available on teams

## 2025-02-26 NOTE — PROGRESS NOTE ADULT - ASSESSMENT
ASSESSMENT/PLAN: s/p sacrectomy, S4/S5 nerve root sacrifices     NEURO:  NQ1, VSQ1  Sacrectomy precautions   CT pelvis in the AM  Tylenol + Oxy PRN for pain   Activity: [] OOB as tolerated [] Bedrest [] PT [] OT [] PMNR    PULM:  RA  sat > 92%    CV:  SBP goal:   Losartan 50 QD   Atorvastatin 10    RENAL:  Fluids: NS at 75ccs/hour  Terry as per neurosgx    GI:  Diet: NPO, pending dysphagia   GI prophylaxis [] not indicated [] PPI [] other:  Bowel regimen [] colace [] senna [] other:    ENDO:   Goal euglycemia (-180)    HEME/ONC:  VTE prophylaxis: [x] SCDs [] chemoprophylaxis [] high risk of DVT/PE on admission due to:    ID: afebrile     MISC:    SOCIAL/FAMILY:  [] updated at bedside [] family meeting    CODE STATUS:  [] Full Code [] DNR [] DNI [] Palliative/Comfort Care    DISPOSITION:  [] ICU [] Stroke Unit [] Floor [] EMU [] RCU [] PCU    [] Patient is at high risk of neurologic deterioration/death due to:     Time seen:  Time spent: ___ [] critical care minutes

## 2025-02-26 NOTE — PROGRESS NOTE ADULT - ATTENDING COMMENTS
49F h/o HTN, T2DM (on ozempic), MVP, anemia, developed lower back/ tailbone pain 9/2024, MRI with sacrococcygeal lesion, had inconclusive biopsies, presented today 2/26 for scheduled distal sacrectomy for sacrococcygeal disorder.     Exam: Ox3, FC, no CN abnormalities, strength 5/5 throughout, otherwise as above  Vitals/labs/imaging reviewed    Assessment: sacrococcygeal lesion s/p sacrectomy  neurochecks q1h overnight  CT pelvix in AM  q2h re-positioning  nothing RI  PCA for pain control  SBP goal   maintain Terry  regular diet  maintain euglycemia, lantus 5U  SCDs, hold off chemoppx tonight  rubin-op ancef    Critical care time: 35minutes spent examining patient, reviewing clinical data, coordinating interdisciplinary care, frequent monitoring for clinical deterioration for which patient is at high risk 2/2 sacrococcygeal lesion s/p sacrectomy. Agree with above.

## 2025-02-26 NOTE — PROGRESS NOTE ADULT - SUBJECTIVE AND OBJECTIVE BOX
HOSPITAL COURSE: refer to the [facility] [place] for details       Admission Scores  GCS:   HH:   MF:   NIHSS:   RASS:    CAM-ICU:   ICH:    24 hour Events:       Allergies    No Known Allergies    Intolerances        REVIEW OF SYSTEMS: [ ] Unable to Assess due to neurologic exam   [ ] All ROS addressed below are non-contributory, except:  Neuro: [ ] Headache [ ] Back pain [ ] Numbness [ ] Weakness [ ] Ataxia [ ] Dizziness [ ] Aphasia [ ] Dysarthria [ ] Visual disturbance  Resp: [ ] Shortness of breath/dyspnea, [ ] Orthopnea [ ] Cough  CV: [ ] Chest pain [ ] Palpitation [ ] Lightheadedness [ ] Syncope  Renal: [ ] Thirst [ ] Edema  GI: [ ] Nausea [ ] Emesis [ ] Abdominal pain [ ] Constipation [ ] Diarrhea  Hem: [ ] Hematemesis [ ] bright red blood per rectum  ID: [ ] Fever [ ] Chills [ ] Dysuria  ENT: [ ] Rhinorrhea      DEVICES:   [ ] Restraints [ ] ET tube [ ] central line [ ] arterial line [ ] graham [ ] NGT/OGT [ ] EVD [ ] LD [ ] JOVANNA/HMV [ ] Trach [ ] PEG [ ] Chest Tube     VITALS:   Vital Signs Last 24 Hrs  T(C): 36.8 (26 Feb 2025 07:24), Max: 36.8 (26 Feb 2025 07:04)  T(F): 98.2 (26 Feb 2025 07:04), Max: 98.2 (26 Feb 2025 07:04)  HR: 77 (26 Feb 2025 07:24) (77 - 77)  BP: 153/88 (26 Feb 2025 07:24) (153/88 - 153/88)  BP(mean): --  RR: 18 (26 Feb 2025 07:24) (18 - 18)  SpO2: 98% (26 Feb 2025 07:24) (98% - 98%)      CAPILLARY BLOOD GLUCOSE      POCT Blood Glucose.: 106 mg/dL (26 Feb 2025 06:37)    I&O's Summary      Respiratory:        LABS:               MEDICATION LEVELS:     IVF FLUIDS/MEDICATIONS:   MEDICATIONS  (STANDING):  influenza   Vaccine 0.5 milliLiter(s) IntraMuscular once    MEDICATIONS  (PRN):        IMAGING:      EXAMINATION:  PHYSICAL EXAM:    Constitutional: No Acute Distress     Neurological: Awake, alert oriented to person, place and time, Following Commands, PERRL, EOMI, No Gaze Preference, Face Symmetrical, Speech Fluent, No dysmetria, No ataxia, No nystagmus     Motor exam:          Upper extremity                         Delt     Bicep     Tricep    HG                                                 R         5/5 5/5 5/5 5/5                                               L          5/5 5/5 5/5 5/5          Lower extremity                        HF         KF        KE       DF         PF                                                  R        5/5 5/5 5/5 5/5 5/5                                               L         5/5 5/5 5/5 5/5 5/5                                                 Pulmonary: Clear to Auscultation, No rales, No rhonchi, No wheezes     Cardiovascular: S1, S2, Regular rate and rhythm     Gastrointestinal: Soft, Non-tender, Non-distended     Extremities: No calf tenderness     Incision: c.d.i.

## 2025-02-26 NOTE — BRIEF OPERATIVE NOTE - OPERATION/FINDINGS
Posterior S2 Sacrectomy  Sacrifice of b/l S4 and S5 nerve root  Sacro-illiac Instrumentation  Plastics Closure (Corey)
Alloderm placement over rectus. Back closed in layers. x2 JOVANNA drains placed. Prevena placed over incision.

## 2025-02-26 NOTE — CONSULT NOTE ADULT - SUBJECTIVE AND OBJECTIVE BOX
PLASTIC SURGERY CONSULT NOTE    Patient is a 49y old  Female who presents with a chief complaint of "I have pain on my tailbone, removing a mass, I had biopsies."  Goal of care: "no pain, removal of mass." (10 Feb 2025 15:21)      HPI:  49 year old female c/o lower back/ tailbone pain since September, I have had inconclusive biopsies, presents today to [facility] for scheduled distal sacrectomy for sacrococcygeal disorders on 2/26/2025. Her medical history significant for MVP (unknown last echo), hypertension, T2DM on Ozempic, previous h/o anemia. She denies any pain at present, no change in her bowel habits.    NSGY reached out to PRS for intra-operative consult for closure of back wound/alloderm placement.     PAST MEDICAL & SURGICAL HISTORY:  Uterine leiomyoma, unspecified location      H/O mitral valve prolapse      Hypertension      H/O diabetes mellitus      Disorder of sacrococcygeal spine      S/P partial hysterectomy      S/P knee surgery      History of cholecystectomy        [  ] No significant past history as reviewed with the patient and family    FAMILY HISTORY:  Family hx of hypertension (Father)      [  ] Family history not pertinent as reviewed with the patient and family    SOCIAL HISTORY:    MEDICATIONS  (STANDING):  influenza   Vaccine 0.5 milliLiter(s) IntraMuscular once    MEDICATIONS  (PRN):    Allergies    No Known Allergies    Intolerances        Vital Signs Last 24 Hrs  T(C): 36.8 (26 Feb 2025 07:24), Max: 36.8 (26 Feb 2025 07:04)  T(F): 98.2 (26 Feb 2025 07:04), Max: 98.2 (26 Feb 2025 07:04)  HR: 77 (26 Feb 2025 07:24) (77 - 77)  BP: 153/88 (26 Feb 2025 07:24) (153/88 - 153/88)  BP(mean): --  RR: 18 (26 Feb 2025 07:24) (18 - 18)  SpO2: 98% (26 Feb 2025 07:24) (98% - 98%)      Daily Height in cm: 162.6 (26 Feb 2025 07:24)    Daily             IMAGING STUDIES:

## 2025-02-27 LAB
ANION GAP SERPL CALC-SCNC: 9 MMOL/L — SIGNIFICANT CHANGE UP (ref 5–17)
BUN SERPL-MCNC: 8 MG/DL — SIGNIFICANT CHANGE UP (ref 7–23)
CALCIUM SERPL-MCNC: 8.9 MG/DL — SIGNIFICANT CHANGE UP (ref 8.4–10.5)
CHLORIDE SERPL-SCNC: 105 MMOL/L — SIGNIFICANT CHANGE UP (ref 96–108)
CO2 SERPL-SCNC: 26 MMOL/L — SIGNIFICANT CHANGE UP (ref 22–31)
CREAT SERPL-MCNC: 0.57 MG/DL — SIGNIFICANT CHANGE UP (ref 0.5–1.3)
EGFR: 111 ML/MIN/1.73M2 — SIGNIFICANT CHANGE UP
EGFR: 111 ML/MIN/1.73M2 — SIGNIFICANT CHANGE UP
GLUCOSE BLDC GLUCOMTR-MCNC: 132 MG/DL — HIGH (ref 70–99)
GLUCOSE BLDC GLUCOMTR-MCNC: 168 MG/DL — HIGH (ref 70–99)
GLUCOSE BLDC GLUCOMTR-MCNC: 190 MG/DL — HIGH (ref 70–99)
GLUCOSE BLDC GLUCOMTR-MCNC: 204 MG/DL — HIGH (ref 70–99)
GLUCOSE SERPL-MCNC: 148 MG/DL — HIGH (ref 70–99)
HCT VFR BLD CALC: 28.1 % — LOW (ref 34.5–45)
HGB BLD-MCNC: 9.8 G/DL — LOW (ref 11.5–15.5)
MAGNESIUM SERPL-MCNC: 2 MG/DL — SIGNIFICANT CHANGE UP (ref 1.6–2.6)
MCHC RBC-ENTMCNC: 30.7 PG — SIGNIFICANT CHANGE UP (ref 27–34)
MCHC RBC-ENTMCNC: 34.9 G/DL — SIGNIFICANT CHANGE UP (ref 32–36)
MCV RBC AUTO: 88.1 FL — SIGNIFICANT CHANGE UP (ref 80–100)
NRBC BLD AUTO-RTO: 0 /100 WBCS — SIGNIFICANT CHANGE UP (ref 0–0)
PHOSPHATE SERPL-MCNC: 3 MG/DL — SIGNIFICANT CHANGE UP (ref 2.5–4.5)
PLATELET # BLD AUTO: 197 K/UL — SIGNIFICANT CHANGE UP (ref 150–400)
POTASSIUM SERPL-MCNC: 4.5 MMOL/L — SIGNIFICANT CHANGE UP (ref 3.5–5.3)
POTASSIUM SERPL-SCNC: 4.5 MMOL/L — SIGNIFICANT CHANGE UP (ref 3.5–5.3)
RBC # BLD: 3.19 M/UL — LOW (ref 3.8–5.2)
RBC # FLD: 12.6 % — SIGNIFICANT CHANGE UP (ref 10.3–14.5)
SODIUM SERPL-SCNC: 140 MMOL/L — SIGNIFICANT CHANGE UP (ref 135–145)
WBC # BLD: 12.2 K/UL — HIGH (ref 3.8–10.5)
WBC # FLD AUTO: 12.2 K/UL — HIGH (ref 3.8–10.5)

## 2025-02-27 PROCEDURE — 99232 SBSQ HOSP IP/OBS MODERATE 35: CPT | Mod: 25

## 2025-02-27 PROCEDURE — 72192 CT PELVIS W/O DYE: CPT | Mod: 26

## 2025-02-27 RX ORDER — TRAMADOL HYDROCHLORIDE 50 MG/1
50 TABLET, FILM COATED ORAL EVERY 4 HOURS
Refills: 0 | Status: DISCONTINUED | OUTPATIENT
Start: 2025-02-27 | End: 2025-03-03

## 2025-02-27 RX ORDER — BACITRACIN 500 UNIT/G
1 OINTMENT (GRAM) TOPICAL DAILY
Refills: 0 | Status: COMPLETED | OUTPATIENT
Start: 2025-02-27 | End: 2025-03-02

## 2025-02-27 RX ORDER — ENOXAPARIN SODIUM 100 MG/ML
40 INJECTION SUBCUTANEOUS
Refills: 0 | Status: DISCONTINUED | OUTPATIENT
Start: 2025-02-27 | End: 2025-03-05

## 2025-02-27 RX ORDER — CEFAZOLIN SODIUM IN 0.9 % NACL 3 G/100 ML
2000 INTRAVENOUS SOLUTION, PIGGYBACK (ML) INTRAVENOUS EVERY 8 HOURS
Refills: 0 | Status: DISCONTINUED | OUTPATIENT
Start: 2025-02-27 | End: 2025-03-05

## 2025-02-27 RX ORDER — TRAMADOL HYDROCHLORIDE 50 MG/1
25 TABLET, FILM COATED ORAL EVERY 4 HOURS
Refills: 0 | Status: DISCONTINUED | OUTPATIENT
Start: 2025-02-27 | End: 2025-03-03

## 2025-02-27 RX ORDER — METHOCARBAMOL 500 MG/1
500 TABLET, FILM COATED ORAL EVERY 6 HOURS
Refills: 0 | Status: DISCONTINUED | OUTPATIENT
Start: 2025-02-27 | End: 2025-03-03

## 2025-02-27 RX ADMIN — INSULIN LISPRO 2: 100 INJECTION, SOLUTION INTRAVENOUS; SUBCUTANEOUS at 11:56

## 2025-02-27 RX ADMIN — METHOCARBAMOL 500 MILLIGRAM(S): 500 TABLET, FILM COATED ORAL at 17:13

## 2025-02-27 RX ADMIN — GABAPENTIN 100 MILLIGRAM(S): 400 CAPSULE ORAL at 05:16

## 2025-02-27 RX ADMIN — GABAPENTIN 100 MILLIGRAM(S): 400 CAPSULE ORAL at 21:42

## 2025-02-27 RX ADMIN — Medication 2 TABLET(S): at 21:42

## 2025-02-27 RX ADMIN — SODIUM CHLORIDE 70 MILLILITER(S): 9 INJECTION, SOLUTION INTRAVENOUS at 04:02

## 2025-02-27 RX ADMIN — Medication 100 MILLIGRAM(S): at 04:02

## 2025-02-27 RX ADMIN — TRAMADOL HYDROCHLORIDE 50 MILLIGRAM(S): 50 TABLET, FILM COATED ORAL at 19:36

## 2025-02-27 RX ADMIN — GABAPENTIN 100 MILLIGRAM(S): 400 CAPSULE ORAL at 13:14

## 2025-02-27 RX ADMIN — LOSARTAN POTASSIUM 50 MILLIGRAM(S): 100 TABLET, FILM COATED ORAL at 05:15

## 2025-02-27 RX ADMIN — INSULIN LISPRO 4: 100 INJECTION, SOLUTION INTRAVENOUS; SUBCUTANEOUS at 21:43

## 2025-02-27 RX ADMIN — Medication 1 APPLICATION(S): at 11:27

## 2025-02-27 RX ADMIN — Medication 30 MILLILITER(S): at 07:03

## 2025-02-27 RX ADMIN — POLYETHYLENE GLYCOL 3350 17 GRAM(S): 17 POWDER, FOR SOLUTION ORAL at 11:26

## 2025-02-27 RX ADMIN — METHOCARBAMOL 500 MILLIGRAM(S): 500 TABLET, FILM COATED ORAL at 11:26

## 2025-02-27 RX ADMIN — ATORVASTATIN CALCIUM 10 MILLIGRAM(S): 80 TABLET, FILM COATED ORAL at 21:42

## 2025-02-27 RX ADMIN — ENOXAPARIN SODIUM 40 MILLIGRAM(S): 100 INJECTION SUBCUTANEOUS at 17:13

## 2025-02-27 RX ADMIN — Medication 100 MILLIGRAM(S): at 15:24

## 2025-02-27 RX ADMIN — Medication 100 MILLIGRAM(S): at 21:42

## 2025-02-27 RX ADMIN — INSULIN LISPRO 2: 100 INJECTION, SOLUTION INTRAVENOUS; SUBCUTANEOUS at 17:14

## 2025-02-27 RX ADMIN — TRAMADOL HYDROCHLORIDE 50 MILLIGRAM(S): 50 TABLET, FILM COATED ORAL at 20:36

## 2025-02-27 RX ADMIN — INSULIN GLARGINE-YFGN 5 UNIT(S): 100 INJECTION, SOLUTION SUBCUTANEOUS at 21:42

## 2025-02-27 RX ADMIN — METHOCARBAMOL 500 MILLIGRAM(S): 500 TABLET, FILM COATED ORAL at 23:40

## 2025-02-27 RX ADMIN — TRAMADOL HYDROCHLORIDE 50 MILLIGRAM(S): 50 TABLET, FILM COATED ORAL at 15:24

## 2025-02-27 NOTE — OCCUPATIONAL THERAPY INITIAL EVALUATION ADULT - PERTINENT HX OF CURRENT PROBLEM, REHAB EVAL
49 year old female c/o lower back/ tailbone pain since September, I have had inconclusive biopsies, presents today to [facility] for scheduled distal sacrectomy for sacrococcygeal disorders on 2/26/2025. Her medical history significant for MVP (unknown last echo), hypertension, T2DM on Ozempic, previous h/o anemia. She denies any pain at present, no change in her bowel habits.now s/p Posterior S2 Sacrectomy, Sacrifice of b/l S4 and S5 nerve root, Sacro-illiac Instrumentation on 2/26

## 2025-02-27 NOTE — OCCUPATIONAL THERAPY INITIAL EVALUATION ADULT - ADDITIONAL COMMENTS
Pt lives in an apt with her spouse +4+5 steps to negotiate with handrails, tub shower. Pt was ambulating (I) without an AD and was (I) with all ADLs prior to admission.

## 2025-02-27 NOTE — PROGRESS NOTE ADULT - SUBJECTIVE AND OBJECTIVE BOX
Plastic Surgery Progress Note (pg [facility]: [id number], [facility]: [phone])    SUBJECTIVE  The patient was seen and examined. No acute events overnight. Pain controlled, afebrile w/ stable vitals.     OBJECTIVE  ___________________________________________________  VITAL SIGNS / I&O's   Vital Signs Last 24 Hrs  T(C): 36.6 (27 Feb 2025 07:00), Max: 36.6 (26 Feb 2025 16:00)  T(F): 97.8 (27 Feb 2025 07:00), Max: 97.9 (26 Feb 2025 16:00)  HR: 65 (27 Feb 2025 07:00) (59 - 88)  BP: 118/56 (27 Feb 2025 07:00) (100/58 - 158/71)  BP(mean): 81 (27 Feb 2025 07:00) (73 - 102)  RR: 20 (27 Feb 2025 07:00) (13 - 20)  SpO2: 95% (27 Feb 2025 07:00) (93% - 100%)    Parameters below as of 27 Feb 2025 07:00  Patient On (Oxygen Delivery Method): room air          26 Feb 2025 07:01  -  27 Feb 2025 07:00  --------------------------------------------------------  IN:    IV PiggyBack: 100 mL    Lactated Ringers: 1120 mL    Oral Fluid: 300 mL  Total IN: 1520 mL    OUT:    Drain (mL): 160 mL    Drain (mL): 158 mL    Indwelling Catheter - Urethral (mL): 1975 mL  Total OUT: 2293 mL    Total NET: -773 mL      27 Feb 2025 07:01  -  27 Feb 2025 09:56  --------------------------------------------------------  IN:    Lactated Ringers: 140 mL    Oral Fluid: 200 mL  Total IN: 340 mL    OUT:    Indwelling Catheter - Urethral (mL): 260 mL  Total OUT: 260 mL    Total NET: 80 mL        ___________________________________________________  PHYSICAL EXAM    -- CONSTITUTIONAL: NAD, lying in bed  -- NEURO: Awake, alert  -- HEENT: NC/AT, mucous membranes moist  -- NECK: Soft, no asymmetry  -- PULM: Non-labored respirations, equal chest rise bilaterally  -- BACK: Soft, flat. Prevena in place intact to suction. JPx2 SS.  -- EXTREMITIES: Warm and well perfused  -- PSYCH: Affect normal, A&Ox3      ___________________________________________________  LABS                        9.8    12.20 )-----------( 197      ( 27 Feb 2025 04:16 )             28.1     27 Feb 2025 04:16    140    |  105    |  8      ----------------------------<  148    4.5     |  26     |  0.57     Ca    8.9        27 Feb 2025 04:16  Phos  3.0       27 Feb 2025 04:16  Mg     2.0       27 Feb 2025 04:16        CAPILLARY BLOOD GLUCOSE      POCT Blood Glucose.: 132 mg/dL (27 Feb 2025 07:20)  POCT Blood Glucose.: 198 mg/dL (26 Feb 2025 21:38)  POCT Blood Glucose.: 205 mg/dL (26 Feb 2025 17:12)        Urinalysis Basic - ( 27 Feb 2025 04:16 )    Color: x / Appearance: x / SG: x / pH: x  Gluc: 148 mg/dL / Ketone: x  / Bili: x / Urobili: x   Blood: x / Protein: x / Nitrite: x   Leuk Esterase: x / RBC: x / WBC x   Sq Epi: x / Non Sq Epi: x / Bacteria: x      ___________________________________________________  MICRO  Recent Cultures:    ___________________________________________________  MEDICATIONS  (STANDING):  atorvastatin 10 milliGRAM(s) Oral at bedtime  chlorhexidine 4% Liquid 1 Application(s) Topical daily  enoxaparin Injectable 40 milliGRAM(s) SubCutaneous <User Schedule>  gabapentin 100 milliGRAM(s) Oral every 8 hours  insulin glargine Injectable (LANTUS) 5 Unit(s) SubCutaneous at bedtime  insulin lispro (ADMELOG) corrective regimen sliding scale   SubCutaneous Before meals and at bedtime  losartan 50 milliGRAM(s) Oral daily  methocarbamol 500 milliGRAM(s) Oral every 6 hours  polyethylene glycol 3350 17 Gram(s) Oral daily  senna 2 Tablet(s) Oral at bedtime    MEDICATIONS  (PRN):  acetaminophen     Tablet .. 650 milliGRAM(s) Oral every 6 hours PRN Temp greater or equal to 38C (100.4F), Mild Pain (1 - 3)  naloxone Injectable 0.1 milliGRAM(s) IV Push every 3 minutes PRN For ANY of the following changes in patient status:  A. RR LESS THAN 10 breaths per minute, B. Oxygen saturation LESS THAN 90%, C. Sedation score of 6  ondansetron Injectable 4 milliGRAM(s) IV Push every 6 hours PRN Nausea  traMADol 25 milliGRAM(s) Oral every 4 hours PRN Moderate Pain (4 - 6)  traMADol 50 milliGRAM(s) Oral every 4 hours PRN Severe Pain (7 - 10)

## 2025-02-27 NOTE — PHYSICAL THERAPY INITIAL EVALUATION ADULT - ADDITIONAL COMMENTS
Pt lives in an apt with her spouse +4+5 steps to negotiate with handrails. PTA Pt was ambulating (I) without an AD and was (I) with all ADLs prior to admission.

## 2025-02-27 NOTE — PROGRESS NOTE ADULT - SUBJECTIVE AND OBJECTIVE BOX
HOSPITAL COURSE: refer to the [facility] for details       Admission Scores  GCS:   HH:   MF:   NIHSS:   RASS:    CAM-ICU:   ICH:    24 hour Events:       Allergies    No Known Allergies    Intolerances        REVIEW OF SYSTEMS: [ ] Unable to Assess due to neurologic exam   [ ] All ROS addressed below are non-contributory, except:  Neuro: [ ] Headache [ ] Back pain [ ] Numbness [ ] Weakness [ ] Ataxia [ ] Dizziness [ ] Aphasia [ ] Dysarthria [ ] Visual disturbance  Resp: [ ] Shortness of breath/dyspnea, [ ] Orthopnea [ ] Cough  CV: [ ] Chest pain [ ] Palpitation [ ] Lightheadedness [ ] Syncope  Renal: [ ] Thirst [ ] Edema  GI: [ ] Nausea [ ] Emesis [ ] Abdominal pain [ ] Constipation [ ] Diarrhea  Hem: [ ] Hematemesis [ ] bright red blood per rectum  ID: [ ] Fever [ ] Chills [ ] Dysuria  ENT: [ ] Rhinorrhea         MEDICATION LEVELS:     IVF FLUIDS/MEDICATIONS:   MEDICATIONS  (STANDING):  influenza   Vaccine 0.5 milliLiter(s) IntraMuscular once    MEDICATIONS  (PRN):      EXAMINATION:  PHYSICAL EXAM:    Constitutional: No Acute Distress     Neurological: Awake, alert oriented to person, place and time, Following Commands, PERRL, EOMI, No Gaze Preference, Face Symmetrical, Speech Fluent, No dysmetria, No ataxia, No nystagmus     Motor exam:          Upper extremity                         Delt     Bicep     Tricep    HG                                                 R         5/5        5/5        5/5       5/5                                               L          5/5        5/5        5/5       5/5          Lower extremity                        HF         KF        KE       DF         PF                                                  R        5/5        5/5        5/5       5/5         5/5                                               L         5/5        5/5       5/5       5/5          5/5                                                 Pulmonary: Clear to Auscultation, No rales, No rhonchi, No wheezes     Cardiovascular: S1, S2, Regular rate and rhythm     Gastrointestinal: Soft, Non-tender, Non-distended     Extremities: No calf tenderness     Incision: c.d.i.    Rest of vitals, labs and imaging reviewed

## 2025-02-27 NOTE — PROGRESS NOTE ADULT - SUBJECTIVE AND OBJECTIVE BOX
Day 1 of Anesthesia Pain Management Service    SUBJECTIVE: A little pain moving    Pain Scale Score:	[X] Refer to charted pain scores    THERAPY:    [ ] IV PCA Morphine		        [ ] 5 mg/mL	[ ] 1 mg/mL  [X] IV PCA Hydromorphone	[ ] 5 mg/mL	[X] 1 mg/mL  [ ] IV PCA Fentanyl		        [ ] 50 micrograms/mL    Demand dose: 0.2 mg     Lockout: 6 minutes   Continuous Rate: 0 mg/hr  4 Hour Limit: 4 mg    MEDICATIONS  (STANDING):  atorvastatin 10 milliGRAM(s) Oral at bedtime  chlorhexidine 4% Liquid 1 Application(s) Topical daily  enoxaparin Injectable 40 milliGRAM(s) SubCutaneous <User Schedule>  gabapentin 100 milliGRAM(s) Oral every 8 hours  insulin glargine Injectable (LANTUS) 5 Unit(s) SubCutaneous at bedtime  insulin lispro (ADMELOG) corrective regimen sliding scale   SubCutaneous Before meals and at bedtime  losartan 50 milliGRAM(s) Oral daily  methocarbamol 500 milliGRAM(s) Oral every 6 hours  polyethylene glycol 3350 17 Gram(s) Oral daily  senna 2 Tablet(s) Oral at bedtime    MEDICATIONS  (PRN):  acetaminophen     Tablet .. 650 milliGRAM(s) Oral every 6 hours PRN Temp greater or equal to 38C (100.4F), Mild Pain (1 - 3)  traMADol 25 milliGRAM(s) Oral every 4 hours PRN Moderate Pain (4 - 6)  traMADol 50 milliGRAM(s) Oral every 4 hours PRN Severe Pain (7 - 10)      OBJECTIVE:    Sedation Score:	[ X] Alert	 [ ] Drowsy 	[ ] Arousable	[ ] Asleep	[ ] Unresponsive    Side Effects:	[X ] None	[ ] Nausea	[ ] Vomiting	[ ] Pruritus  		[ ] Other:    Vital Signs Last 24 Hrs  T(C): 36.6 (27 Feb 2025 07:00), Max: 36.6 (26 Feb 2025 16:00)  T(F): 97.8 (27 Feb 2025 07:00), Max: 97.9 (26 Feb 2025 16:00)  HR: 65 (27 Feb 2025 07:00) (59 - 88)  BP: 118/56 (27 Feb 2025 07:00) (100/58 - 158/71)  BP(mean): 81 (27 Feb 2025 07:00) (73 - 102)  RR: 20 (27 Feb 2025 07:00) (13 - 20)  SpO2: 95% (27 Feb 2025 07:00) (93% - 100%)    Parameters below as of 27 Feb 2025 07:00  Patient On (Oxygen Delivery Method): room air        ASSESSMENT/ PLAN    Therapy to  be:               [  ] Continued   [X ] Discontinued   [ X] Changed to PRN Analgesics    Documentation and Verification of current medications:   [X] Done	[ ] Not done, not eligible    Comments: PCA d\c'd by primary service. Transitioned to prn analgesics Total PCA use 2.3mg / 15 hours.
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
## 2025-02-27 NOTE — PHYSICAL THERAPY INITIAL EVALUATION ADULT - BALANCE TRAINING, PT EVAL
GOAL: Pt will demonstrate improved static/dynamic standing balance to normal in order to improve stability, decrease fall risk and increase independence with ADLs within 4 weeks.

## 2025-02-27 NOTE — PROGRESS NOTE ADULT - ASSESSMENT
ASSESSMENT/PLAN: s/p sacrectomy, S4/S5 nerve root sacrifices     NEURO:  NQ1, VSQ1  Sacrectomy precautions   CT pelvis in the AM  Tylenol + Oxy PRN for pain   Activity: [] OOB as tolerated [] Bedrest [] PT [] OT [] PMNR    PULM:  RA  sat > 92%    CV:  SBP goal:   Losartan 50 QD   Atorvastatin 10    RENAL:  Fluids: NS at 75ccs/hour  Terry as per neurosgx    GI:  Diet: NPO, pending dysphagia   GI prophylaxis [] not indicated [] PPI [] other:  Bowel regimen [] colace [] senna [] other:    ENDO:   Goal euglycemia (-180)    HEME/ONC:  VTE prophylaxis: [x] SCDs [] chemoprophylaxis [] high risk of DVT/PE on admission due to:    ID: afebrile     MISC:    SOCIAL/FAMILY:  [] updated at bedside [] family meeting    CODE STATUS:  [] Full Code [] DNR [] DNI [] Palliative/Comfort Care    DISPOSITION:  [] ICU [] Stroke Unit [] Floor [] EMU [] RCU [] PCU    [] Patient is at high risk of neurologic deterioration/death due to:     Time seen:  Time spent: ___ [] critical care minutes ASSESSMENT/PLAN: s/p sacrectomy, S4/S5 nerve root sacrifices     NEURO:  NQ4, VSQ4  Sacrectomy precautions   CT pelvis in the AM  Tylenol + Oxy PRN for pain   Activity: [] OOB as tolerated [] Bedrest [] PT [] OT [] PMNR    PULM:  RA  sat > 92%    CV:  SBP goal:   Losartan 50 QD   Atorvastatin 10    RENAL:  Fluids: NS at 75ccs/hour  Terry as per neurosgx    GI:  Diet: Regular diet  GI prophylaxis [] not indicated [] PPI [] other:  Bowel regimen [] colace [] senna [] other:    ENDO:   Goal euglycemia (-180)    HEME/ONC:  VTE prophylaxis: [x] SCDs [] chemoprophylaxis [] high risk of DVT/PE on admission due to:    ID: afebrile     MISC:    SOCIAL/FAMILY:  [] updated at bedside [] family meeting    CODE STATUS:  [] Full Code [] DNR [] DNI [] Palliative/Comfort Care    DISPOSITION:  [] ICU [] Stroke Unit [] Floor [] EMU [] RCU [] PCU    [] Patient is at high risk of neurologic deterioration/death due to:     Time seen:  Time spent: ___ [] critical care minutes

## 2025-02-27 NOTE — PROGRESS NOTE ADULT - ASSESSMENT
ASSESSMENT/PLAN:   JANINA SEBASTIAN is a 49yFemale s/p distal sacrectomy with closure by PRS.     - Monitor drain outputs  - Prevena in place until POD5  - Pain control  - Advance diet as tolerated, ensure pt taking adequate protein  - Appreciate rest of care per primary team    Plastic Surgery   [facility]: [id number]  [facility]: [phone]

## 2025-02-27 NOTE — PHYSICAL THERAPY INITIAL EVALUATION ADULT - GENERAL OBSERVATIONS, REHAB EVAL
Rec'd sidelying in bed in NAD, VSS, IVL, +PCA pump, +graham, +prevena vac, +JOVANNA drain x2, +NSCU monitoring, spouse at b/s, agreeable to PT

## 2025-02-27 NOTE — PHYSICAL THERAPY INITIAL EVALUATION ADULT - GAIT DEVIATIONS NOTED, PT EVAL
decreased everette/increased time in double stance/decreased step length/decreased stride length/decreased weight-shifting ability

## 2025-02-27 NOTE — PROGRESS NOTE ADULT - SUBJECTIVE AND OBJECTIVE BOX
OVERNIGHT EVENTS: NAEO    SUBJECTIVE: Pt seen and examined at bedside. Patient comfortable and in no-apparent distress. Pain is controlled. AVSS. JOVANNA L 160, JOVANNA R 158.    MEDICATIONS  (STANDING):  atorvastatin 10 milliGRAM(s) Oral at bedtime  chlorhexidine 4% Liquid 1 Application(s) Topical daily  enoxaparin Injectable 40 milliGRAM(s) SubCutaneous <User Schedule>  gabapentin 100 milliGRAM(s) Oral every 8 hours  insulin glargine Injectable (LANTUS) 5 Unit(s) SubCutaneous at bedtime  insulin lispro (ADMELOG) corrective regimen sliding scale   SubCutaneous Before meals and at bedtime  losartan 50 milliGRAM(s) Oral daily  methocarbamol 500 milliGRAM(s) Oral every 6 hours  polyethylene glycol 3350 17 Gram(s) Oral daily  senna 2 Tablet(s) Oral at bedtime    MEDICATIONS  (PRN):  acetaminophen     Tablet .. 650 milliGRAM(s) Oral every 6 hours PRN Temp greater or equal to 38C (100.4F), Mild Pain (1 - 3)  naloxone Injectable 0.1 milliGRAM(s) IV Push every 3 minutes PRN For ANY of the following changes in patient status:  A. RR LESS THAN 10 breaths per minute, B. Oxygen saturation LESS THAN 90%, C. Sedation score of 6  ondansetron Injectable 4 milliGRAM(s) IV Push every 6 hours PRN Nausea  traMADol 25 milliGRAM(s) Oral every 4 hours PRN Moderate Pain (4 - 6)  traMADol 50 milliGRAM(s) Oral every 4 hours PRN Severe Pain (7 - 10)    T(C): 36.6 (02-27-25 @ 07:00), Max: 36.6 (02-26-25 @ 16:00)  HR: 65 (02-27-25 @ 07:00) (59 - 88)  BP: 118/56 (02-27-25 @ 07:00) (100/58 - 158/71)  RR: 20 (02-27-25 @ 07:00) (13 - 20)  SpO2: 95% (02-27-25 @ 07:00) (93% - 100%)    02-26-25 @ 07:01  -  02-27-25 @ 07:00  --------------------------------------------------------  IN: 1520 mL / OUT: 2293 mL / NET: -773 mL    02-27-25 @ 07:01  -  02-27-25 @ 09:58  --------------------------------------------------------  IN: 340 mL / OUT: 260 mL / NET: 80 mL      LABS:                        9.8    12.20 )-----------( 197      ( 27 Feb 2025 04:16 )             28.1     02-27    140  |  105  |  8   ----------------------------<  148[H]  4.5   |  26  |  0.57    Ca    8.9      27 Feb 2025 04:16  Phos  3.0     02-27  Mg     2.0     02-27        Urinalysis Basic - ( 27 Feb 2025 04:16 )    Color: x / Appearance: x / SG: x / pH: x  Gluc: 148 mg/dL / Ketone: x  / Bili: x / Urobili: x   Blood: x / Protein: x / Nitrite: x   Leuk Esterase: x / RBC: x / WBC x   Sq Epi: x / Non Sq Epi: x / Bacteria: x      PE:  Gen: NAD  CV: Pulse regular present  Resp: Nonlabored  Abdomen: Soft, nontender  Back: Prevena vac holding suction, JPx2 ss output. No appreciable mass or fluctuance.

## 2025-02-27 NOTE — PROGRESS NOTE ADULT - ASSESSMENT
49 year old female c/o lower back/ tailbone pain since September, with inconclusive biopsies. Her medical history significant for MVP (unknown last echo), hypertension, T2DM on Ozempic, previous h/o anemia.     Pt now s/p d istal sacrectomy for sacrococcygeal disorder on 2/26/2025. PRS consulted for closure of back wound intraoperatively    Plan:  -Pain Control   -Monitor JOVANNA output  -Prevena in place until POD 5   -Rest care per primary team    Plastic Surgery   [phone]

## 2025-02-27 NOTE — PROGRESS NOTE ADULT - SUBJECTIVE AND OBJECTIVE BOX
Day 1 of Anesthesia Pain Management Service    SUBJECTIVE: Patient is doing well with IV PCA    Pain Scale Score:	[X] Refer to charted pain scores    THERAPY:    [ ] IV PCA Morphine		[ ] 5 mg/mL	[ ] 1 mg/mL  [X] IV PCA Hydromorphone	[ ] 5 mg/mL	[X] 1 mg/mL  [ ] IV PCA Fentanyl		[ ] 50 micrograms/mL    Demand dose: 0.2 mg     Lockout: 6 minutes   Continuous Rate: 0 mg/hr  4 Hour Limit: 4 mg    MEDICATIONS  (STANDING):  atorvastatin 10 milliGRAM(s) Oral at bedtime  chlorhexidine 4% Liquid 1 Application(s) Topical daily  enoxaparin Injectable 40 milliGRAM(s) SubCutaneous <User Schedule>  gabapentin 100 milliGRAM(s) Oral every 8 hours  insulin glargine Injectable (LANTUS) 5 Unit(s) SubCutaneous at bedtime  insulin lispro (ADMELOG) corrective regimen sliding scale   SubCutaneous Before meals and at bedtime  losartan 50 milliGRAM(s) Oral daily  methocarbamol 500 milliGRAM(s) Oral every 6 hours  polyethylene glycol 3350 17 Gram(s) Oral daily  senna 2 Tablet(s) Oral at bedtime    MEDICATIONS  (PRN):  acetaminophen     Tablet .. 650 milliGRAM(s) Oral every 6 hours PRN Temp greater or equal to 38C (100.4F), Mild Pain (1 - 3)  traMADol 25 milliGRAM(s) Oral every 4 hours PRN Moderate Pain (4 - 6)  traMADol 50 milliGRAM(s) Oral every 4 hours PRN Severe Pain (7 - 10)      OBJECTIVE:    Sedation Score:	[ X] Alert	[ ] Drowsy 	[ ] Arousable	[ ] Asleep	[ ] Unresponsive    Side Effects:	[X ] None	[ ] Nausea	[ ] Vomiting	[ ] Pruritus  		[ ] Other:    Vital Signs Last 24 Hrs  T(C): 36.6 (27 Feb 2025 07:00), Max: 36.6 (26 Feb 2025 16:00)  T(F): 97.8 (27 Feb 2025 07:00), Max: 97.9 (26 Feb 2025 16:00)  HR: 65 (27 Feb 2025 07:00) (59 - 88)  BP: 118/56 (27 Feb 2025 07:00) (100/58 - 158/71)  BP(mean): 81 (27 Feb 2025 07:00) (73 - 102)  RR: 20 (27 Feb 2025 07:00) (13 - 20)  SpO2: 95% (27 Feb 2025 07:00) (93% - 100%)    Parameters below as of 27 Feb 2025 07:00  Patient On (Oxygen Delivery Method): room air        ASSESSMENT/ PLAN    Therapy to  be:               [] Continued   [X ] Discontinued   [X ] Changed to PRN Analgesics    Documentation and Verification of current medications:   [X] Done	[ ] Not done, not eligible    Comments: PCA d/c'd, transition to PO analgesics per team.

## 2025-02-27 NOTE — PHYSICAL THERAPY INITIAL EVALUATION ADULT - PERTINENT HX OF CURRENT PROBLEM, REHAB EVAL
49 year old female c/o lower back/ tailbone pain since September, I have had inconclusive biopsies, presents today to [facility] for scheduled distal sacrectomy for sacrococcygeal disorders on 2/26/2025. Her medical history significant for MVP (unknown last echo), hypertension, T2DM on Ozempic, previous h/o anemia. She denies any pain at present, no change in her bowel habits. ***Her last dose of  Ozempic was 2/3/2025. Pt now s/p Posterior S2 Sacrectomy, Sacrifice of b/l S4 and S5 nerve root, Sacro-illiac Instrumentation on 2/26

## 2025-02-28 LAB
ANION GAP SERPL CALC-SCNC: 7 MMOL/L — SIGNIFICANT CHANGE UP (ref 5–17)
BUN SERPL-MCNC: 11 MG/DL — SIGNIFICANT CHANGE UP (ref 7–23)
CALCIUM SERPL-MCNC: 8.8 MG/DL — SIGNIFICANT CHANGE UP (ref 8.4–10.5)
CHLORIDE SERPL-SCNC: 104 MMOL/L — SIGNIFICANT CHANGE UP (ref 96–108)
CO2 SERPL-SCNC: 30 MMOL/L — SIGNIFICANT CHANGE UP (ref 22–31)
CREAT SERPL-MCNC: 0.44 MG/DL — LOW (ref 0.5–1.3)
EGFR: 118 ML/MIN/1.73M2 — SIGNIFICANT CHANGE UP
EGFR: 118 ML/MIN/1.73M2 — SIGNIFICANT CHANGE UP
GLUCOSE BLDC GLUCOMTR-MCNC: 110 MG/DL — HIGH (ref 70–99)
GLUCOSE BLDC GLUCOMTR-MCNC: 181 MG/DL — HIGH (ref 70–99)
GLUCOSE BLDC GLUCOMTR-MCNC: 219 MG/DL — HIGH (ref 70–99)
GLUCOSE BLDC GLUCOMTR-MCNC: 231 MG/DL — HIGH (ref 70–99)
GLUCOSE SERPL-MCNC: 133 MG/DL — HIGH (ref 70–99)
HCT VFR BLD CALC: 25.8 % — LOW (ref 34.5–45)
HGB BLD-MCNC: 8.7 G/DL — LOW (ref 11.5–15.5)
MCHC RBC-ENTMCNC: 30 PG — SIGNIFICANT CHANGE UP (ref 27–34)
MCHC RBC-ENTMCNC: 33.7 G/DL — SIGNIFICANT CHANGE UP (ref 32–36)
MCV RBC AUTO: 89 FL — SIGNIFICANT CHANGE UP (ref 80–100)
NRBC BLD AUTO-RTO: 0 /100 WBCS — SIGNIFICANT CHANGE UP (ref 0–0)
PLATELET # BLD AUTO: 172 K/UL — SIGNIFICANT CHANGE UP (ref 150–400)
POTASSIUM SERPL-MCNC: 4 MMOL/L — SIGNIFICANT CHANGE UP (ref 3.5–5.3)
POTASSIUM SERPL-SCNC: 4 MMOL/L — SIGNIFICANT CHANGE UP (ref 3.5–5.3)
RBC # BLD: 2.9 M/UL — LOW (ref 3.8–5.2)
RBC # FLD: 13.2 % — SIGNIFICANT CHANGE UP (ref 10.3–14.5)
SODIUM SERPL-SCNC: 141 MMOL/L — SIGNIFICANT CHANGE UP (ref 135–145)
WBC # BLD: 10.63 K/UL — HIGH (ref 3.8–10.5)
WBC # FLD AUTO: 10.63 K/UL — HIGH (ref 3.8–10.5)

## 2025-02-28 RX ADMIN — METHOCARBAMOL 500 MILLIGRAM(S): 500 TABLET, FILM COATED ORAL at 05:23

## 2025-02-28 RX ADMIN — Medication 100 MILLIGRAM(S): at 13:12

## 2025-02-28 RX ADMIN — TRAMADOL HYDROCHLORIDE 50 MILLIGRAM(S): 50 TABLET, FILM COATED ORAL at 22:15

## 2025-02-28 RX ADMIN — TRAMADOL HYDROCHLORIDE 50 MILLIGRAM(S): 50 TABLET, FILM COATED ORAL at 05:23

## 2025-02-28 RX ADMIN — INSULIN LISPRO 4: 100 INJECTION, SOLUTION INTRAVENOUS; SUBCUTANEOUS at 12:34

## 2025-02-28 RX ADMIN — Medication 100 MILLIGRAM(S): at 05:23

## 2025-02-28 RX ADMIN — Medication 100 MILLIGRAM(S): at 21:15

## 2025-02-28 RX ADMIN — TRAMADOL HYDROCHLORIDE 50 MILLIGRAM(S): 50 TABLET, FILM COATED ORAL at 17:43

## 2025-02-28 RX ADMIN — TRAMADOL HYDROCHLORIDE 50 MILLIGRAM(S): 50 TABLET, FILM COATED ORAL at 06:23

## 2025-02-28 RX ADMIN — INSULIN LISPRO 2: 100 INJECTION, SOLUTION INTRAVENOUS; SUBCUTANEOUS at 22:06

## 2025-02-28 RX ADMIN — ENOXAPARIN SODIUM 40 MILLIGRAM(S): 100 INJECTION SUBCUTANEOUS at 17:09

## 2025-02-28 RX ADMIN — ATORVASTATIN CALCIUM 10 MILLIGRAM(S): 80 TABLET, FILM COATED ORAL at 21:16

## 2025-02-28 RX ADMIN — Medication 1 APPLICATION(S): at 11:27

## 2025-02-28 RX ADMIN — POLYETHYLENE GLYCOL 3350 17 GRAM(S): 17 POWDER, FOR SOLUTION ORAL at 11:27

## 2025-02-28 RX ADMIN — GABAPENTIN 100 MILLIGRAM(S): 400 CAPSULE ORAL at 13:12

## 2025-02-28 RX ADMIN — GABAPENTIN 100 MILLIGRAM(S): 400 CAPSULE ORAL at 05:22

## 2025-02-28 RX ADMIN — INSULIN GLARGINE-YFGN 5 UNIT(S): 100 INJECTION, SOLUTION SUBCUTANEOUS at 22:06

## 2025-02-28 RX ADMIN — TRAMADOL HYDROCHLORIDE 50 MILLIGRAM(S): 50 TABLET, FILM COATED ORAL at 21:15

## 2025-02-28 RX ADMIN — GABAPENTIN 100 MILLIGRAM(S): 400 CAPSULE ORAL at 21:15

## 2025-02-28 RX ADMIN — TRAMADOL HYDROCHLORIDE 50 MILLIGRAM(S): 50 TABLET, FILM COATED ORAL at 16:43

## 2025-02-28 RX ADMIN — METHOCARBAMOL 500 MILLIGRAM(S): 500 TABLET, FILM COATED ORAL at 17:09

## 2025-02-28 RX ADMIN — METHOCARBAMOL 500 MILLIGRAM(S): 500 TABLET, FILM COATED ORAL at 11:27

## 2025-02-28 RX ADMIN — Medication 2 TABLET(S): at 21:15

## 2025-02-28 RX ADMIN — INSULIN LISPRO 4: 100 INJECTION, SOLUTION INTRAVENOUS; SUBCUTANEOUS at 17:15

## 2025-02-28 NOTE — PROGRESS NOTE ADULT - SUBJECTIVE AND OBJECTIVE BOX
OVERNIGHT EVENTS: NAEO    SUBJECTIVE: Pt seen and examined at bedside. Patient comfortable and in no-apparent distress. Pain is controlled. AVSS. JOVANNA L 170, JOVANNA R 205.     MEDICATIONS  (STANDING):  atorvastatin 10 milliGRAM(s) Oral at bedtime  bacitracin   Ointment 1 Application(s) Topical daily  ceFAZolin   IVPB 2000 milliGRAM(s) IV Intermittent every 8 hours  enoxaparin Injectable 40 milliGRAM(s) SubCutaneous <User Schedule>  gabapentin 100 milliGRAM(s) Oral every 8 hours  insulin glargine Injectable (LANTUS) 5 Unit(s) SubCutaneous at bedtime  insulin lispro (ADMELOG) corrective regimen sliding scale   SubCutaneous Before meals and at bedtime  losartan 50 milliGRAM(s) Oral daily  methocarbamol 500 milliGRAM(s) Oral every 6 hours  polyethylene glycol 3350 17 Gram(s) Oral daily  senna 2 Tablet(s) Oral at bedtime    MEDICATIONS  (PRN):  acetaminophen     Tablet .. 650 milliGRAM(s) Oral every 6 hours PRN Temp greater or equal to 38C (100.4F), Mild Pain (1 - 3)  traMADol 25 milliGRAM(s) Oral every 4 hours PRN Moderate Pain (4 - 6)  traMADol 50 milliGRAM(s) Oral every 4 hours PRN Severe Pain (7 - 10)    T(C): 37.1 (02-28-25 @ 04:55), Max: 37.2 (02-27-25 @ 16:33)  HR: 70 (02-28-25 @ 04:55) (63 - 85)  BP: 103/66 (02-28-25 @ 04:55) (103/66 - 132/79)  RR: 18 (02-28-25 @ 04:55) (12 - 18)  SpO2: 97% (02-28-25 @ 04:55) (94% - 98%)    02-27-25 @ 07:01  -  02-28-25 @ 07:00  --------------------------------------------------------  IN: 890 mL / OUT: 1975 mL / NET: -1085 mL      LABS:                        8.7    10.63 )-----------( 172      ( 28 Feb 2025 05:54 )             25.8     02-28    141  |  104  |  11  ----------------------------<  133[H]  4.0   |  30  |  0.44[L]    Ca    8.8      28 Feb 2025 05:54  Phos  3.0     02-27  Mg     2.0     02-27        Urinalysis Basic - ( 28 Feb 2025 05:54 )    Color: x / Appearance: x / SG: x / pH: x  Gluc: 133 mg/dL / Ketone: x  / Bili: x / Urobili: x   Blood: x / Protein: x / Nitrite: x   Leuk Esterase: x / RBC: x / WBC x   Sq Epi: x / Non Sq Epi: x / Bacteria: x      PE:  Gen: NAD  CV: Pulse regular present  Resp: Nonlabored  Abdomen: Soft, nontender  BACK: Soft, flat. Prevena in place intact to suction. JPx2 SS.

## 2025-02-28 NOTE — PROGRESS NOTE ADULT - ASSESSMENT
JANINA JAZ is a 49yFemale s/p distal sacrectomy with closure by PRS.     - Monitor drain outputs  - Prevena in place until POD5  - Pain control  - Advance diet as tolerated, ensure pt taking adequate protein  - Appreciate rest of care per primary team    Plastic Surgery   [facility]: [id number]  [facility]: [phone]

## 2025-02-28 NOTE — PROGRESS NOTE ADULT - ASSESSMENT
49 year old female c/o lower back/ tailbone pain since September, I have had inconclusive biopsies, presents today to [facility] for scheduled distal sacrectomy for sacrococcygeal disorders on 2/26/2025. Her medical history significant for MVP (unknown last echo), hypertension, T2DM on Ozempic, previous h/o anemia. She denies any pain at present, no change in her bowel habits.    Post-op day # 2 s/p distal sacrectomy, S4/S5 nerve root sacrifices     PLAN    NEURO:  NQ4, VSQ4  Sacrectomy precautions   CT pelvis in the AM  Tylenol + Oxy PRN for pain   Activity: [] OOB as tolerated [] Bedrest [] PT [] OT [] PMNR    PULM:  RA  sat > 92%    CV:  SBP goal:   Losartan 50 QD   Atorvastatin 10    RENAL:  Fluids: NS at 75ccs/hour  Terry as per neurosgx    GI:  Diet: Regular diet  GI prophylaxis [] not indicated [] PPI [] other:  Bowel regimen [] colace [] senna [] other:    ENDO:   Goal euglycemia (-180)    HEME/ONC:  VTE prophylaxis: [x] SCDs [] chemoprophylaxis [] high risk of DVT/PE on admission due to:    ID: afebrile     Dispo: Home with no PT needs    Will discuss with Dr Hendrickson 49 year old female c/o lower back/ tailbone pain since September, I have had inconclusive biopsies, presents today to [facility] for scheduled distal sacrectomy for sacrococcygeal disorders on 2/26/2025. Her medical history significant for MVP (unknown last echo), hypertension, T2DM on Ozempic, previous h/o anemia. She denies any pain at present, no change in her bowel habits.    Post-op day # 2 s/p distal sacrectomy, S4/S5 nerve root sacrifices     PLAN    NEURO:  NQ4, VSQ4  Sacrectomy precautions   CT pelvis seen and reviewed  Tylenol + Oxy PRN for pain   Activity: [x] OOB as tolerated   PT/OT following    PULM:  RA  sat > 94%    CV:  SBP Hemodynamically stable  Losartan 50 QD   Atorvastatin 10    RENAL:  IVL  Terry as per neurosgx    GI:  Diet: Regular diet  GI prophylaxis [] not indicated [] PPI [] other:  Bowel regimen [X] colace [X] senna [] other:  Last BM PTA, has been passing gas    ENDO:   Goal euglycemia (-180)    HEME/ONC:  VTE prophylaxis: [x] SCDs [x] chemoprophylaxis [] high risk of DVT/PE on admission due to:    ID: afebrile     Dispo: Home with outpatient PT and no OT needs    Will discuss with Dr Hendrickson  [phone]

## 2025-02-28 NOTE — ADVANCED PRACTICE NURSE CONSULT - REASON FOR CONSULT
Requested by staff to assess skin status: chest. PMH is noted:  49 year old female c/o lower back/ tailbone pain since September, I have had inconclusive biopsies, presents today to [facility] for scheduled distal sacrectomy for sacrococcygeal disorders on 2/26/2025. Her medical history significant for MVP (unknown last echo), hypertension, T2DM on Ozempic, previous h/o anemia. She denies any pain at present, no change in her bowel habits.  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

## 2025-02-28 NOTE — ADVANCED PRACTICE NURSE CONSULT - RECOMMEDATIONS
1. chest: apply cavilon daily, continue to monitor  2. continue to encourage mobility, T&P  3. seat cushion when oob to chair  4. off-load heels when in bed  5. nutrition support as pt condition allows  Tx plan discussed with pt/SO/RN

## 2025-02-28 NOTE — ADVANCED PRACTICE NURSE CONSULT - ASSESSMENT
The pt was encountered on [facility] with her significant other at the bedside. Ms Walker was awake and alert, engaging in conversation. she is in a low air-loss surface and is able to turn in the bed.   On her chest - on the sternum and between the breasts is a superficial wound measuring 3cmx 2cm x0cm. the wound is moist with no active drainage- the periwound skin is intact. Pt recalls having "something on my chest that was pulled off" this may be MARSI- medical adhesive related skin damage. will recommend Cavilon to lay down a protective coating on the skin.  discussed skin condition and tx plan with pt and her SO

## 2025-02-28 NOTE — PROGRESS NOTE ADULT - SUBJECTIVE AND OBJECTIVE BOX
49 year old female c/o lower back/ tailbone pain since September, I have had inconclusive biopsies, presents today to [facility] for scheduled distal sacrectomy for sacrococcygeal disorders on 2/26/2025. Her medical history significant for MVP (unknown last echo), hypertension, T2DM on Ozempic, previous h/o anemia. She denies any pain at present, no change in her bowel habits.    Post-op day # 2 s/p distal sacrectomy, S4/S5 nerve root sacrifices     OVERNIGHT EVENTS: no acuteevent    Vital Signs Last 24 Hrs  T(C): 37.1 (28 Feb 2025 04:55), Max: 37.2 (27 Feb 2025 16:33)  T(F): 98.8 (28 Feb 2025 04:55), Max: 99 (27 Feb 2025 16:33)  HR: 70 (28 Feb 2025 04:55) (63 - 85)  BP: 103/66 (28 Feb 2025 04:55) (103/66 - 132/79)  BP(mean): 77 (27 Feb 2025 14:13) (77 - 87)  RR: 18 (28 Feb 2025 04:55) (12 - 18)  SpO2: 97% (28 Feb 2025 04:55) (94% - 98%)    Parameters below as of 28 Feb 2025 04:55  Patient On (Oxygen Delivery Method): room air        I&O's Detail    27 Feb 2025 07:01  -  28 Feb 2025 07:00  --------------------------------------------------------  IN:    Lactated Ringers: 140 mL    Oral Fluid: 1250 mL  Total IN: 1390 mL    OUT:    Drain (mL): 170 mL    Drain (mL): 205 mL    Indwelling Catheter - Urethral (mL): 1600 mL  Total OUT: 1975 mL    Total NET: -585 mL        I&O's Summary    27 Feb 2025 07:01  -  28 Feb 2025 07:00  --------------------------------------------------------  IN: 1390 mL / OUT: 1975 mL / NET: -585 mL        PHYSICAL EXAM:  Neurological:    Motor exam:         [] Upper extremity                 D             B          T          WE       WF      HI                                               R         5/5        5/5        5/5       5/5     5/5       5/5                                               L          5/5        5/5        5/5       5/5     5/5       5/5         [] Lower extremity               HF            KF        KE         EHL        FHL                                               R        5/5        5/5        5/5       5/5         5/5                                               L         5/5        5/5       5/5       5/5          5/5                                                        [] warm well perfused; capillary refill <3 seconds     Sensation: [] intact to light touch  [] decreased:       Gait    Cardiovascular: Regular  Respiratory: Clear bilaterally  Gastrointestinal: Abd soft + BS non tender  Genitourinary:  Extremities: -C/C/E  Incision/Wound: Intact    TUBES/LINES:  [] CVC  [] A-line  [] Lumbar Drain  [] Ventriculostomy  [] Other    DIET:  [] NPO  [] Mechanical  [] Tube feeds    LABS:                        8.7    10.63 )-----------( 172      ( 28 Feb 2025 05:54 )             25.8     02-28    141  |  104  |  11  ----------------------------<  133[H]  4.0   |  30  |  0.44[L]    Ca    8.8      28 Feb 2025 05:54  Phos  3.0     02-27  Mg     2.0     02-27        Urinalysis Basic - ( 28 Feb 2025 05:54 )    Color: x / Appearance: x / SG: x / pH: x  Gluc: 133 mg/dL / Ketone: x  / Bili: x / Urobili: x   Blood: x / Protein: x / Nitrite: x   Leuk Esterase: x / RBC: x / WBC x   Sq Epi: x / Non Sq Epi: x / Bacteria: x          CAPILLARY BLOOD GLUCOSE      POCT Blood Glucose.: 204 mg/dL (27 Feb 2025 21:33)  POCT Blood Glucose.: 168 mg/dL (27 Feb 2025 17:04)  POCT Blood Glucose.: 190 mg/dL (27 Feb 2025 11:36)          Drug Levels: [] N/A    CSF Analysis: [] N/A      Allergies    No Known Allergies    Intolerances      MEDICATIONS:  Antibiotics:  ceFAZolin   IVPB 2000 milliGRAM(s) IV Intermittent every 8 hours    Neuro:  acetaminophen     Tablet .. 650 milliGRAM(s) Oral every 6 hours PRN  gabapentin 100 milliGRAM(s) Oral every 8 hours  methocarbamol 500 milliGRAM(s) Oral every 6 hours  traMADol 25 milliGRAM(s) Oral every 4 hours PRN  traMADol 50 milliGRAM(s) Oral every 4 hours PRN    Anticoagulation:  enoxaparin Injectable 40 milliGRAM(s) SubCutaneous <User Schedule>    OTHER:  atorvastatin 10 milliGRAM(s) Oral at bedtime  bacitracin   Ointment 1 Application(s) Topical daily  insulin glargine Injectable (LANTUS) 5 Unit(s) SubCutaneous at bedtime  insulin lispro (ADMELOG) corrective regimen sliding scale   SubCutaneous Before meals and at bedtime  losartan 50 milliGRAM(s) Oral daily  polyethylene glycol 3350 17 Gram(s) Oral daily  senna 2 Tablet(s) Oral at bedtime    IVF:    CULTURES:    RADIOLOGY & ADDITIONAL TESTS:             49 year old female c/o lower back/ tailbone pain since September, I have had inconclusive biopsies, presents today to [facility] for scheduled distal sacrectomy for sacrococcygeal disorders on 2/26/2025. Her medical history significant for MVP (unknown last echo), hypertension, T2DM on Ozempic, previous h/o anemia. She denies any pain at present, no change in her bowel habits.    Post-op day # 2 s/p distal sacrectomy, S4/S5 nerve root sacrifices     OVERNIGHT EVENTS: no acute event    Vital Signs Last 24 Hrs  T(C): 37.1 (28 Feb 2025 04:55), Max: 37.2 (27 Feb 2025 16:33)  T(F): 98.8 (28 Feb 2025 04:55), Max: 99 (27 Feb 2025 16:33)  HR: 70 (28 Feb 2025 04:55) (63 - 85)  BP: 103/66 (28 Feb 2025 04:55) (103/66 - 132/79)  BP(mean): 77 (27 Feb 2025 14:13) (77 - 87)  RR: 18 (28 Feb 2025 04:55) (12 - 18)  SpO2: 97% (28 Feb 2025 04:55) (94% - 98%)    Parameters below as of 28 Feb 2025 04:55  Patient On (Oxygen Delivery Method): room air        I&O's Detail    27 Feb 2025 07:01  -  28 Feb 2025 07:00  --------------------------------------------------------  IN:    Lactated Ringers: 140 mL    Oral Fluid: 1250 mL  Total IN: 1390 mL    OUT:    Drain (mL): 170 mL    Drain (mL): 205 mL    Indwelling Catheter - Urethral (mL): 1600 mL  Total OUT: 1975 mL    Total NET: -585 mL        I&O's Summary    27 Feb 2025 07:01  -  28 Feb 2025 07:00  --------------------------------------------------------  IN: 1390 mL / OUT: 1975 mL / NET: -585 mL        PHYSICAL EXAM:  Neurological:    Motor exam:         [x] Upper extremity                 D             B          T          WE       WF      HI                                               R         5/5        5/5        5/5       5/5     5/5       5/5                                               L          5/5        5/5        5/5       5/5     5/5       5/5         [x] Lower extremity               HF            KF        KE         EHL        FHL                                               R        5/5        5/5        5/5       5/5         5/5                                               L         5/5        5/5       5/5       5/5          5/5       Sensation: [x] intact to light touch  [] decreased:       Gait    Cardiovascular: Regular  Respiratory: Clear bilaterally  Gastrointestinal: Abd soft + BS non tender  Genitourinary:  Extremities: -C/C/E  Incision/Wound: Intact, Pravena in place, Left J/P 170cc, Rt J/P 205    TUBES/LINES:  [] CVC  [] A-line  [] Lumbar Drain  [] Ventriculostomy  [] Other    DIET: Regular  [] NPO  [] Mechanical  [] Tube feeds    LABS:                        8.7    10.63 )-----------( 172      ( 28 Feb 2025 05:54 )             25.8     02-28    141  |  104  |  11  ----------------------------<  133[H]  4.0   |  30  |  0.44[L]    Ca    8.8      28 Feb 2025 05:54  Phos  3.0     02-27  Mg     2.0     02-27        Urinalysis Basic - ( 28 Feb 2025 05:54 )    Color: x / Appearance: x / SG: x / pH: x  Gluc: 133 mg/dL / Ketone: x  / Bili: x / Urobili: x   Blood: x / Protein: x / Nitrite: x   Leuk Esterase: x / RBC: x / WBC x   Sq Epi: x / Non Sq Epi: x / Bacteria: x          CAPILLARY BLOOD GLUCOSE      POCT Blood Glucose.: 204 mg/dL (27 Feb 2025 21:33)  POCT Blood Glucose.: 168 mg/dL (27 Feb 2025 17:04)  POCT Blood Glucose.: 190 mg/dL (27 Feb 2025 11:36)          Drug Levels: [] N/A    CSF Analysis: [] N/A      Allergies    No Known Allergies    Intolerances      MEDICATIONS:  Antibiotics:  ceFAZolin   IVPB 2000 milliGRAM(s) IV Intermittent every 8 hours    Neuro:  acetaminophen     Tablet .. 650 milliGRAM(s) Oral every 6 hours PRN  gabapentin 100 milliGRAM(s) Oral every 8 hours  methocarbamol 500 milliGRAM(s) Oral every 6 hours  traMADol 25 milliGRAM(s) Oral every 4 hours PRN  traMADol 50 milliGRAM(s) Oral every 4 hours PRN    Anticoagulation:  enoxaparin Injectable 40 milliGRAM(s) SubCutaneous <User Schedule>    OTHER:  atorvastatin 10 milliGRAM(s) Oral at bedtime  bacitracin   Ointment 1 Application(s) Topical daily  insulin glargine Injectable (LANTUS) 5 Unit(s) SubCutaneous at bedtime  insulin lispro (ADMELOG) corrective regimen sliding scale   SubCutaneous Before meals and at bedtime  losartan 50 milliGRAM(s) Oral daily  polyethylene glycol 3350 17 Gram(s) Oral daily  senna 2 Tablet(s) Oral at bedtime    IVF:    CULTURES:    RADIOLOGY & ADDITIONAL TESTS:  < from: CT Pelvis No Cont (02.27.25 @ 15:17) >  Partial sacrectomy changes are again seen with bilateral sacroiliac   screws.                    49 year old female c/o lower back/ tailbone pain since September, I have had inconclusive biopsies, presents today to [facility] for scheduled distal sacrectomy for sacrococcygeal disorders on 2/26/2025. Her medical history significant for MVP (unknown last echo), hypertension, T2DM on Ozempic, previous h/o anemia. She denies any pain at present, no change in her bowel habits.    Post-op day # 2 s/p distal sacrectomy, S4/S5 nerve root sacrifices     OVERNIGHT EVENTS: no acute event, she was seen in bed comfortable, current analgesic has been keeping her pain under control    Vital Signs Last 24 Hrs  T(C): 37.1 (28 Feb 2025 04:55), Max: 37.2 (27 Feb 2025 16:33)  T(F): 98.8 (28 Feb 2025 04:55), Max: 99 (27 Feb 2025 16:33)  HR: 70 (28 Feb 2025 04:55) (63 - 85)  BP: 103/66 (28 Feb 2025 04:55) (103/66 - 132/79)  BP(mean): 77 (27 Feb 2025 14:13) (77 - 87)  RR: 18 (28 Feb 2025 04:55) (12 - 18)  SpO2: 97% (28 Feb 2025 04:55) (94% - 98%)    Parameters below as of 28 Feb 2025 04:55  Patient On (Oxygen Delivery Method): room air        I&O's Detail    27 Feb 2025 07:01  -  28 Feb 2025 07:00  --------------------------------------------------------  IN:    Lactated Ringers: 140 mL    Oral Fluid: 1250 mL  Total IN: 1390 mL    OUT:    Drain (mL): 170 mL    Drain (mL): 205 mL    Indwelling Catheter - Urethral (mL): 1600 mL  Total OUT: 1975 mL    Total NET: -585 mL        I&O's Summary    27 Feb 2025 07:01  -  28 Feb 2025 07:00  --------------------------------------------------------  IN: 1390 mL / OUT: 1975 mL / NET: -585 mL        PHYSICAL EXAM:  Neurological:    Motor exam:         [x] Upper extremity                 D             B          T          WE       WF      HI                                               R         5/5        5/5        5/5       5/5     5/5       5/5                                               L          5/5        5/5        5/5       5/5     5/5       5/5         [x] Lower extremity               HF            KF        KE         EHL        FHL                                               R        5/5        5/5        5/5       5/5         5/5                                               L         5/5        5/5       5/5       5/5          5/5       Sensation: [x] intact to light touch  [] decreased:       Gait    Cardiovascular: Regular  Respiratory: Clear bilaterally  Gastrointestinal: Abd soft + BS non tender  Genitourinary:  Extremities: -C/C/E  Incision/Wound: Intact, Pravena in place, Left J/P 170cc, Rt J/P 205    TUBES/LINES:  [] CVC  [] A-line  [] Lumbar Drain  [] Ventriculostomy  [] Other    DIET: Regular  [] NPO  [] Mechanical  [] Tube feeds    LABS:                        8.7    10.63 )-----------( 172      ( 28 Feb 2025 05:54 )             25.8     02-28    141  |  104  |  11  ----------------------------<  133[H]  4.0   |  30  |  0.44[L]    Ca    8.8      28 Feb 2025 05:54  Phos  3.0     02-27  Mg     2.0     02-27        Urinalysis Basic - ( 28 Feb 2025 05:54 )    Color: x / Appearance: x / SG: x / pH: x  Gluc: 133 mg/dL / Ketone: x  / Bili: x / Urobili: x   Blood: x / Protein: x / Nitrite: x   Leuk Esterase: x / RBC: x / WBC x   Sq Epi: x / Non Sq Epi: x / Bacteria: x          CAPILLARY BLOOD GLUCOSE      POCT Blood Glucose.: 204 mg/dL (27 Feb 2025 21:33)  POCT Blood Glucose.: 168 mg/dL (27 Feb 2025 17:04)  POCT Blood Glucose.: 190 mg/dL (27 Feb 2025 11:36)          Drug Levels: [] N/A    CSF Analysis: [] N/A      Allergies    No Known Allergies    Intolerances      MEDICATIONS:  Antibiotics:  ceFAZolin   IVPB 2000 milliGRAM(s) IV Intermittent every 8 hours    Neuro:  acetaminophen     Tablet .. 650 milliGRAM(s) Oral every 6 hours PRN  gabapentin 100 milliGRAM(s) Oral every 8 hours  methocarbamol 500 milliGRAM(s) Oral every 6 hours  traMADol 25 milliGRAM(s) Oral every 4 hours PRN  traMADol 50 milliGRAM(s) Oral every 4 hours PRN    Anticoagulation:  enoxaparin Injectable 40 milliGRAM(s) SubCutaneous <User Schedule>    OTHER:  atorvastatin 10 milliGRAM(s) Oral at bedtime  bacitracin   Ointment 1 Application(s) Topical daily  insulin glargine Injectable (LANTUS) 5 Unit(s) SubCutaneous at bedtime  insulin lispro (ADMELOG) corrective regimen sliding scale   SubCutaneous Before meals and at bedtime  losartan 50 milliGRAM(s) Oral daily  polyethylene glycol 3350 17 Gram(s) Oral daily  senna 2 Tablet(s) Oral at bedtime    IVF:    CULTURES:    RADIOLOGY & ADDITIONAL TESTS:  < from: CT Pelvis No Cont (02.27.25 @ 15:17) >  Partial sacrectomy changes are again seen with bilateral sacroiliac   screws.

## 2025-03-01 LAB
GLUCOSE BLDC GLUCOMTR-MCNC: 114 MG/DL — HIGH (ref 70–99)
GLUCOSE BLDC GLUCOMTR-MCNC: 147 MG/DL — HIGH (ref 70–99)
GLUCOSE BLDC GLUCOMTR-MCNC: 174 MG/DL — HIGH (ref 70–99)
GLUCOSE BLDC GLUCOMTR-MCNC: 199 MG/DL — HIGH (ref 70–99)
GLUCOSE BLDC GLUCOMTR-MCNC: 245 MG/DL — HIGH (ref 70–99)
HCT VFR BLD CALC: 28.4 % — LOW (ref 34.5–45)
HGB BLD-MCNC: 9.5 G/DL — LOW (ref 11.5–15.5)
MCHC RBC-ENTMCNC: 30.3 PG — SIGNIFICANT CHANGE UP (ref 27–34)
MCHC RBC-ENTMCNC: 33.5 G/DL — SIGNIFICANT CHANGE UP (ref 32–36)
MCV RBC AUTO: 90.4 FL — SIGNIFICANT CHANGE UP (ref 80–100)
NRBC BLD AUTO-RTO: 0 /100 WBCS — SIGNIFICANT CHANGE UP (ref 0–0)
PLATELET # BLD AUTO: 195 K/UL — SIGNIFICANT CHANGE UP (ref 150–400)
RBC # BLD: 3.14 M/UL — LOW (ref 3.8–5.2)
RBC # FLD: 13.2 % — SIGNIFICANT CHANGE UP (ref 10.3–14.5)
WBC # BLD: 9.53 K/UL — SIGNIFICANT CHANGE UP (ref 3.8–10.5)
WBC # FLD AUTO: 9.53 K/UL — SIGNIFICANT CHANGE UP (ref 3.8–10.5)

## 2025-03-01 RX ORDER — POLYETHYLENE GLYCOL 3350 17 G/17G
17 POWDER, FOR SOLUTION ORAL
Refills: 0 | Status: DISCONTINUED | OUTPATIENT
Start: 2025-03-01 | End: 2025-03-02

## 2025-03-01 RX ADMIN — GABAPENTIN 100 MILLIGRAM(S): 400 CAPSULE ORAL at 13:34

## 2025-03-01 RX ADMIN — GABAPENTIN 100 MILLIGRAM(S): 400 CAPSULE ORAL at 05:16

## 2025-03-01 RX ADMIN — ATORVASTATIN CALCIUM 10 MILLIGRAM(S): 80 TABLET, FILM COATED ORAL at 21:31

## 2025-03-01 RX ADMIN — METHOCARBAMOL 500 MILLIGRAM(S): 500 TABLET, FILM COATED ORAL at 17:15

## 2025-03-01 RX ADMIN — Medication 100 MILLIGRAM(S): at 21:32

## 2025-03-01 RX ADMIN — TRAMADOL HYDROCHLORIDE 50 MILLIGRAM(S): 50 TABLET, FILM COATED ORAL at 11:30

## 2025-03-01 RX ADMIN — METHOCARBAMOL 500 MILLIGRAM(S): 500 TABLET, FILM COATED ORAL at 11:30

## 2025-03-01 RX ADMIN — INSULIN LISPRO 2: 100 INJECTION, SOLUTION INTRAVENOUS; SUBCUTANEOUS at 18:24

## 2025-03-01 RX ADMIN — INSULIN LISPRO 4: 100 INJECTION, SOLUTION INTRAVENOUS; SUBCUTANEOUS at 21:32

## 2025-03-01 RX ADMIN — ENOXAPARIN SODIUM 40 MILLIGRAM(S): 100 INJECTION SUBCUTANEOUS at 17:15

## 2025-03-01 RX ADMIN — Medication 2 TABLET(S): at 21:31

## 2025-03-01 RX ADMIN — METHOCARBAMOL 500 MILLIGRAM(S): 500 TABLET, FILM COATED ORAL at 22:39

## 2025-03-01 RX ADMIN — POLYETHYLENE GLYCOL 3350 17 GRAM(S): 17 POWDER, FOR SOLUTION ORAL at 21:32

## 2025-03-01 RX ADMIN — GABAPENTIN 100 MILLIGRAM(S): 400 CAPSULE ORAL at 21:31

## 2025-03-01 RX ADMIN — Medication 100 MILLIGRAM(S): at 13:33

## 2025-03-01 RX ADMIN — TRAMADOL HYDROCHLORIDE 50 MILLIGRAM(S): 50 TABLET, FILM COATED ORAL at 20:05

## 2025-03-01 RX ADMIN — TRAMADOL HYDROCHLORIDE 50 MILLIGRAM(S): 50 TABLET, FILM COATED ORAL at 05:16

## 2025-03-01 RX ADMIN — Medication 1 APPLICATION(S): at 11:30

## 2025-03-01 RX ADMIN — TRAMADOL HYDROCHLORIDE 50 MILLIGRAM(S): 50 TABLET, FILM COATED ORAL at 06:16

## 2025-03-01 RX ADMIN — Medication 100 MILLIGRAM(S): at 05:17

## 2025-03-01 RX ADMIN — TRAMADOL HYDROCHLORIDE 50 MILLIGRAM(S): 50 TABLET, FILM COATED ORAL at 21:05

## 2025-03-01 RX ADMIN — POLYETHYLENE GLYCOL 3350 17 GRAM(S): 17 POWDER, FOR SOLUTION ORAL at 11:31

## 2025-03-01 RX ADMIN — TRAMADOL HYDROCHLORIDE 50 MILLIGRAM(S): 50 TABLET, FILM COATED ORAL at 12:28

## 2025-03-01 RX ADMIN — LOSARTAN POTASSIUM 50 MILLIGRAM(S): 100 TABLET, FILM COATED ORAL at 05:17

## 2025-03-01 RX ADMIN — INSULIN GLARGINE-YFGN 5 UNIT(S): 100 INJECTION, SOLUTION SUBCUTANEOUS at 21:32

## 2025-03-01 NOTE — PROGRESS NOTE ADULT - ASSESSMENT
HPI:  49 year old female c/o lower back/ tailbone pain since September, I have had inconclusive biopsies, presents today to [facility] for scheduled distal sacrectomy for sacrococcygeal disorders on 2/26/2025. Her medical history significant for MVP (unknown last echo), hypertension, T2DM on Ozempic, previous h/o anemia. She denies any pain at present, no change in her bowel habits.    ***Her last dose of  Ozempic was 2/3/2025. (10 Feb 2025 15:21)    PROCEDURE: Sacrectomy for neoplasm     s/p Posterior S2 Sacrectomy  Sacrifice of b/l S4 and S5 nerve root on 2/26     POD#  3   PLAN:  Neuro:   1 Out of bed   2 sacrectomy protocol   3 prn pain meds   4 robaxin for muscle spasm   5 continue gabapentin   6 monitor drain output     Respiratory:   1 room air   2 incentive spirometry     CV:  1 blood pressure stable     Endocrine:   1 DM - a1c 5.6   2 continue lantus 5u and sliding scale     Heme/Onc:             DVT ppx: sql and scds   1 LED negative on 2/26   2 Post op blood loss anemia - monitor     Renal:   1 graham in place   2 iv lock     ID:   1 afebrile     GI:   1 ccd diet   2 miralax and senna     Social/Family:   Discharge planning: dc home once medically cleared     35 minute spent for patient care and answered all questions.    -will discuss with Dr. Lara   -[employer] [id number]

## 2025-03-01 NOTE — PROGRESS NOTE ADULT - SUBJECTIVE AND OBJECTIVE BOX
SUBJECTIVE:   Patient was seen and evaluated at bedside. Patient is resting in bed and is in no new acute distress.   OVERNIGHT EVENTS:   none   Vital Signs Last 24 Hrs  T(C): 36.7 (01 Mar 2025 07:40), Max: 37.2 (28 Feb 2025 13:31)  T(F): 98.1 (01 Mar 2025 07:40), Max: 99 (28 Feb 2025 13:31)  HR: 80 (01 Mar 2025 07:40) (78 - 104)  BP: 112/73 (01 Mar 2025 07:40) (102/66 - 121/70)  BP(mean): --  RR: 18 (01 Mar 2025 07:40) (18 - 18)  SpO2: 97% (01 Mar 2025 07:40) (96% - 99%)    Parameters below as of 01 Mar 2025 07:40  Patient On (Oxygen Delivery Method): room air        PHYSICAL EXAM:    General: No Acute Distress     Neurological: Awake, alert oriented to person, place and time, Following Commands, PERRL, EOMI, Face Symmetrical, Speech Fluent, Moving all extremities, Muscle Strength normal in all four extremities, No Drift, Sensation to Light Touch Intact    Pulmonary: Clear to Auscultation, No Rales, No Rhonchi, No Wheezes     Cardiovascular: S1, S2, Regular Rate and Rhythm     Gastrointestinal: Soft, Nontender, Nondistended     Incision:   clean and dry   LABS:                        8.7    10.63 )-----------( 172      ( 28 Feb 2025 05:54 )             25.8    02-28    141  |  104  |  11  ----------------------------<  133[H]  4.0   |  30  |  0.44[L]    Ca    8.8      28 Feb 2025 05:54          02-28 @ 07:01 - 03-01 @ 07:00  --------------------------------------------------------  IN: 1740 mL / OUT: 1875 mL / NET: -135 mL    03-01 @ 07:01 - 03-01 @ 10:50  --------------------------------------------------------  IN: 260 mL / OUT: 0 mL / NET: 260 mL      DRAINS:   L gonzalez 120 cc , right gonzalez 205 cc   MEDICATIONS:  Antibiotics:  ceFAZolin   IVPB 2000 milliGRAM(s) IV Intermittent every 8 hours    Neuro:  acetaminophen     Tablet .. 650 milliGRAM(s) Oral every 6 hours PRN Temp greater or equal to 38C (100.4F), Mild Pain (1 - 3)  gabapentin 100 milliGRAM(s) Oral every 8 hours  methocarbamol 500 milliGRAM(s) Oral every 6 hours  traMADol 25 milliGRAM(s) Oral every 4 hours PRN Moderate Pain (4 - 6)  traMADol 50 milliGRAM(s) Oral every 4 hours PRN Severe Pain (7 - 10)    Cardiac:  losartan 50 milliGRAM(s) Oral daily    Pulm:    GI/:  polyethylene glycol 3350 17 Gram(s) Oral daily  senna 2 Tablet(s) Oral at bedtime    Other:   atorvastatin 10 milliGRAM(s) Oral at bedtime  bacitracin   Ointment 1 Application(s) Topical daily  enoxaparin Injectable 40 milliGRAM(s) SubCutaneous <User Schedule>  insulin glargine Injectable (LANTUS) 5 Unit(s) SubCutaneous at bedtime  insulin lispro (ADMELOG) corrective regimen sliding scale   SubCutaneous Before meals and at bedtime    DIET: [] Regular [] CCD [] Renal [] Puree [] Dysphagia [] Tube Feeds:   ccd diet   IMAGING:   ACC: [id number] EXAM:  CT PELVIS ONLY   ORDERED BY: RETA OLGUIN     PROCEDURE DATE:  02/27/2025          INTERPRETATION:  CT PELVIS    HISTORY: Status post sacrectomy for sacral notochordal tumor.    TECHNIQUE: Contiguous axial imaging was performedthrough the pelvis   without contrast.  Coronal and sagittal reformatting was utilized.    COMPARISON: Intraoperative x-rays dated 2/26/2025. CT-guided biopsy dated   12/5/2024. CT abdomen and pelvis dated 10/15/2024.    FINDINGS:    OSSEOUS STRUCTURES    Fractures:  Mild healed deformity of the right pubic rami/pubic body..    Postoperative Changes: Partial sacrectomy is present through the S2   level with bilateral sacroiliac screws and interlocking zoë. 2 soft   tissue drains are present with postsurgical soft tissue change.    VISUALIZED SPINE  Mild lower lumbar degenerative disc disease.    PELVIC JOINTS    Sacroiliac Joints:  Mild arthrosis bilaterally with sacral iliac screws.    Symphysis Pubis:  Mild arthrosis.    RIGHT HIP JOINT    Avascular Necrosis:  None.    Joint Space:  Relatively maintained although tiny osteophytes are   present.    Effusion/Synovitis:  None.    LEFT HIP JOINT    Avascular Necrosis:  None.    Joint Space:  Relatively maintained although tiny osteophytes are   present.    Effusion/Synovitis:  None.    SOFT TISSUES    Neurovascular:  Unremarkable.    Pelvis/Abdomen:  Terry catheter within the bladder.    Musculature:  Post surgical changes.    Subcutaneous Tissues:  Postsurgical changes with a few scattered gas   locules and 2 soft tissue drains.    IMPRESSION:  1.  Partial sacrectomy changes are again seen with bilateral sacroiliac   screws.    --- End of Report ---            OWEN MEDRANO MD; Attending Radiologist  This document has been electronically signed. Feb 27 2025  4:00PM

## 2025-03-01 NOTE — PROGRESS NOTE ADULT - ASSESSMENT
JAINNA JAZ is a 49yFemale s/p distal sacrectomy with closure by PRS.     - Monitor drain outputs  - Prevena in place until POD5  - Pain control  - Advance diet as tolerated, ensure pt taking adequate protein  - Appreciate rest of care per primary team    Plastic Surgery   [facility]: [id number]  [facility]: [phone]

## 2025-03-01 NOTE — PROGRESS NOTE ADULT - SUBJECTIVE AND OBJECTIVE BOX
OVERNIGHT EVENTS: NAEO    SUBJECTIVE: Pt seen and examined at bedside. Patient comfortable and in no-apparent distress. Pain is controlled. AVSS. JOVANNA L 120 cc, JOVANNA R 205 cc.     MEDICATIONS  (STANDING):  atorvastatin 10 milliGRAM(s) Oral at bedtime  bacitracin   Ointment 1 Application(s) Topical daily  ceFAZolin   IVPB 2000 milliGRAM(s) IV Intermittent every 8 hours  enoxaparin Injectable 40 milliGRAM(s) SubCutaneous <User Schedule>  gabapentin 100 milliGRAM(s) Oral every 8 hours  insulin glargine Injectable (LANTUS) 5 Unit(s) SubCutaneous at bedtime  insulin lispro (ADMELOG) corrective regimen sliding scale   SubCutaneous Before meals and at bedtime  losartan 50 milliGRAM(s) Oral daily  methocarbamol 500 milliGRAM(s) Oral every 6 hours  polyethylene glycol 3350 17 Gram(s) Oral daily  senna 2 Tablet(s) Oral at bedtime    MEDICATIONS  (PRN):  acetaminophen     Tablet .. 650 milliGRAM(s) Oral every 6 hours PRN Temp greater or equal to 38C (100.4F), Mild Pain (1 - 3)  traMADol 25 milliGRAM(s) Oral every 4 hours PRN Moderate Pain (4 - 6)  traMADol 50 milliGRAM(s) Oral every 4 hours PRN Severe Pain (7 - 10)    T(C): 36.7 (03-01-25 @ 07:40), Max: 37.2 (02-28-25 @ 13:31)  HR: 80 (03-01-25 @ 07:40) (78 - 104)  BP: 112/73 (03-01-25 @ 07:40) (102/66 - 121/70)  RR: 18 (03-01-25 @ 07:40) (18 - 18)  SpO2: 97% (03-01-25 @ 07:40) (96% - 99%)    02-28-25 @ 07:01  -  03-01-25 @ 07:00  --------------------------------------------------------  IN: 1740 mL / OUT: 1875 mL / NET: -135 mL      LABS:                        8.7    10.63 )-----------( 172      ( 28 Feb 2025 05:54 )             25.8     02-28    141  |  104  |  11  ----------------------------<  133[H]  4.0   |  30  |  0.44[L]    Ca    8.8      28 Feb 2025 05:54        Urinalysis Basic - ( 28 Feb 2025 05:54 )    Color: x / Appearance: x / SG: x / pH: x  Gluc: 133 mg/dL / Ketone: x  / Bili: x / Urobili: x   Blood: x / Protein: x / Nitrite: x   Leuk Esterase: x / RBC: x / WBC x   Sq Epi: x / Non Sq Epi: x / Bacteria: x      PE:  Gen: NAD  CV: Pulse regular present  Resp: Nonlabored  Abdomen: Soft, nontender  BACK: Soft, flat. Prevena in place intact to suction. JPx2 SS.

## 2025-03-02 LAB
ANION GAP SERPL CALC-SCNC: 12 MMOL/L — SIGNIFICANT CHANGE UP (ref 5–17)
BUN SERPL-MCNC: 12 MG/DL — SIGNIFICANT CHANGE UP (ref 7–23)
CALCIUM SERPL-MCNC: 8.7 MG/DL — SIGNIFICANT CHANGE UP (ref 8.4–10.5)
CHLORIDE SERPL-SCNC: 93 MMOL/L — LOW (ref 96–108)
CO2 SERPL-SCNC: 30 MMOL/L — SIGNIFICANT CHANGE UP (ref 22–31)
CREAT SERPL-MCNC: 0.41 MG/DL — LOW (ref 0.5–1.3)
EGFR: 121 ML/MIN/1.73M2 — SIGNIFICANT CHANGE UP
EGFR: 121 ML/MIN/1.73M2 — SIGNIFICANT CHANGE UP
GLUCOSE BLDC GLUCOMTR-MCNC: 122 MG/DL — HIGH (ref 70–99)
GLUCOSE BLDC GLUCOMTR-MCNC: 128 MG/DL — HIGH (ref 70–99)
GLUCOSE BLDC GLUCOMTR-MCNC: 151 MG/DL — HIGH (ref 70–99)
GLUCOSE BLDC GLUCOMTR-MCNC: 180 MG/DL — HIGH (ref 70–99)
GLUCOSE SERPL-MCNC: 252 MG/DL — HIGH (ref 70–99)
HCT VFR BLD CALC: 28.2 % — LOW (ref 34.5–45)
HGB BLD-MCNC: 9.5 G/DL — LOW (ref 11.5–15.5)
MCHC RBC-ENTMCNC: 30.2 PG — SIGNIFICANT CHANGE UP (ref 27–34)
MCHC RBC-ENTMCNC: 33.7 G/DL — SIGNIFICANT CHANGE UP (ref 32–36)
MCV RBC AUTO: 89.5 FL — SIGNIFICANT CHANGE UP (ref 80–100)
NRBC BLD AUTO-RTO: 0 /100 WBCS — SIGNIFICANT CHANGE UP (ref 0–0)
PLATELET # BLD AUTO: 200 K/UL — SIGNIFICANT CHANGE UP (ref 150–400)
POTASSIUM SERPL-MCNC: 4 MMOL/L — SIGNIFICANT CHANGE UP (ref 3.5–5.3)
POTASSIUM SERPL-SCNC: 4 MMOL/L — SIGNIFICANT CHANGE UP (ref 3.5–5.3)
RBC # BLD: 3.15 M/UL — LOW (ref 3.8–5.2)
RBC # FLD: 13.2 % — SIGNIFICANT CHANGE UP (ref 10.3–14.5)
SODIUM SERPL-SCNC: 135 MMOL/L — SIGNIFICANT CHANGE UP (ref 135–145)
WBC # BLD: 7.67 K/UL — SIGNIFICANT CHANGE UP (ref 3.8–10.5)
WBC # FLD AUTO: 7.67 K/UL — SIGNIFICANT CHANGE UP (ref 3.8–10.5)

## 2025-03-02 PROCEDURE — 74177 CT ABD & PELVIS W/CONTRAST: CPT | Mod: 26

## 2025-03-02 RX ORDER — ACETAMINOPHEN 500 MG/5ML
1000 LIQUID (ML) ORAL ONCE
Refills: 0 | Status: COMPLETED | OUTPATIENT
Start: 2025-03-02 | End: 2025-03-02

## 2025-03-02 RX ORDER — MAGNESIUM CITRATE
296 SOLUTION, ORAL ORAL ONCE
Refills: 0 | Status: COMPLETED | OUTPATIENT
Start: 2025-03-02 | End: 2025-03-02

## 2025-03-02 RX ORDER — BISACODYL 5 MG
5 TABLET, DELAYED RELEASE (ENTERIC COATED) ORAL ONCE
Refills: 0 | Status: COMPLETED | OUTPATIENT
Start: 2025-03-02 | End: 2025-03-02

## 2025-03-02 RX ORDER — POLYETHYLENE GLYCOL 3350 17 G/17G
17 POWDER, FOR SOLUTION ORAL DAILY
Refills: 0 | Status: DISCONTINUED | OUTPATIENT
Start: 2025-03-02 | End: 2025-03-05

## 2025-03-02 RX ADMIN — Medication 296 MILLILITER(S): at 09:36

## 2025-03-02 RX ADMIN — GABAPENTIN 100 MILLIGRAM(S): 400 CAPSULE ORAL at 05:07

## 2025-03-02 RX ADMIN — Medication 1 APPLICATION(S): at 11:48

## 2025-03-02 RX ADMIN — Medication 5 MILLIGRAM(S): at 14:35

## 2025-03-02 RX ADMIN — TRAMADOL HYDROCHLORIDE 50 MILLIGRAM(S): 50 TABLET, FILM COATED ORAL at 01:23

## 2025-03-02 RX ADMIN — Medication 100 MILLIGRAM(S): at 05:07

## 2025-03-02 RX ADMIN — POLYETHYLENE GLYCOL 3350 17 GRAM(S): 17 POWDER, FOR SOLUTION ORAL at 17:30

## 2025-03-02 RX ADMIN — TRAMADOL HYDROCHLORIDE 50 MILLIGRAM(S): 50 TABLET, FILM COATED ORAL at 23:07

## 2025-03-02 RX ADMIN — INSULIN GLARGINE-YFGN 5 UNIT(S): 100 INJECTION, SOLUTION SUBCUTANEOUS at 21:07

## 2025-03-02 RX ADMIN — Medication 100 MILLIGRAM(S): at 21:06

## 2025-03-02 RX ADMIN — GABAPENTIN 100 MILLIGRAM(S): 400 CAPSULE ORAL at 13:05

## 2025-03-02 RX ADMIN — LOSARTAN POTASSIUM 50 MILLIGRAM(S): 100 TABLET, FILM COATED ORAL at 05:09

## 2025-03-02 RX ADMIN — POLYETHYLENE GLYCOL 3350 17 GRAM(S): 17 POWDER, FOR SOLUTION ORAL at 05:08

## 2025-03-02 RX ADMIN — Medication 250 MILLILITER(S): at 11:56

## 2025-03-02 RX ADMIN — METHOCARBAMOL 500 MILLIGRAM(S): 500 TABLET, FILM COATED ORAL at 05:07

## 2025-03-02 RX ADMIN — GABAPENTIN 100 MILLIGRAM(S): 400 CAPSULE ORAL at 21:07

## 2025-03-02 RX ADMIN — INSULIN LISPRO 2: 100 INJECTION, SOLUTION INTRAVENOUS; SUBCUTANEOUS at 21:08

## 2025-03-02 RX ADMIN — TRAMADOL HYDROCHLORIDE 50 MILLIGRAM(S): 50 TABLET, FILM COATED ORAL at 00:23

## 2025-03-02 RX ADMIN — TRAMADOL HYDROCHLORIDE 50 MILLIGRAM(S): 50 TABLET, FILM COATED ORAL at 06:07

## 2025-03-02 RX ADMIN — Medication 400 MILLIGRAM(S): at 14:35

## 2025-03-02 RX ADMIN — ENOXAPARIN SODIUM 40 MILLIGRAM(S): 100 INJECTION SUBCUTANEOUS at 17:30

## 2025-03-02 RX ADMIN — INSULIN LISPRO 2: 100 INJECTION, SOLUTION INTRAVENOUS; SUBCUTANEOUS at 17:48

## 2025-03-02 RX ADMIN — ATORVASTATIN CALCIUM 10 MILLIGRAM(S): 80 TABLET, FILM COATED ORAL at 21:07

## 2025-03-02 RX ADMIN — TRAMADOL HYDROCHLORIDE 50 MILLIGRAM(S): 50 TABLET, FILM COATED ORAL at 05:07

## 2025-03-02 RX ADMIN — Medication 1000 MILLIGRAM(S): at 14:55

## 2025-03-02 RX ADMIN — METHOCARBAMOL 500 MILLIGRAM(S): 500 TABLET, FILM COATED ORAL at 23:09

## 2025-03-02 RX ADMIN — Medication 100 MILLIGRAM(S): at 13:05

## 2025-03-02 NOTE — PROGRESS NOTE ADULT - SUBJECTIVE AND OBJECTIVE BOX
SUBJECTIVE:   Patient was seen and evaluated at bedside. Patient is resting in bed and is in no new acute distress.   OVERNIGHT EVENTS:   none   Vital Signs Last 24 Hrs  T(C): 36.8 (02 Mar 2025 07:55), Max: 37.1 (01 Mar 2025 19:51)  T(F): 98.2 (02 Mar 2025 07:55), Max: 98.8 (01 Mar 2025 19:51)  HR: 70 (02 Mar 2025 07:55) (70 - 93)  BP: 113/69 (02 Mar 2025 07:55) (103/65 - 132/76)  BP(mean): --  RR: 18 (02 Mar 2025 07:55) (18 - 18)  SpO2: 97% (02 Mar 2025 07:55) (96% - 99%)    Parameters below as of 02 Mar 2025 07:55  Patient On (Oxygen Delivery Method): room air        PHYSICAL EXAM:    General: No Acute Distress     Neurological: Awake, alert oriented to person, place and time, Following Commands, PERRL, EOMI, Face Symmetrical, Speech Fluent, Moving all extremities, Muscle Strength normal in all four extremities, No Drift, Sensation to Light Touch Intact    Pulmonary: Clear to Auscultation, No Rales, No Rhonchi, No Wheezes     Cardiovascular: S1, S2, Regular Rate and Rhythm     Gastrointestinal: Soft, Nontender, Nondistended     Incision: clean and dry     LABS:                        9.5    7.67  )-----------( 200      ( 02 Mar 2025 06:28 )             28.2    03-02    135  |  93[L]  |  12  ----------------------------<  252[H]  4.0   |  30  |  0.41[L]    Ca    8.7      02 Mar 2025 06:21          03-01 @ 07:01  -  03-02 @ 07:00  --------------------------------------------------------  IN: 1410 mL / OUT: 1570 mL / NET: -160 mL      DRAINS:   L JOVANNA 155 CC  R JOVANNA 65 CC   MEDICATIONS:  Antibiotics:  ceFAZolin   IVPB 2000 milliGRAM(s) IV Intermittent every 8 hours    Neuro:  acetaminophen     Tablet .. 650 milliGRAM(s) Oral every 6 hours PRN Temp greater or equal to 38C (100.4F), Mild Pain (1 - 3)  gabapentin 100 milliGRAM(s) Oral every 8 hours  methocarbamol 500 milliGRAM(s) Oral every 6 hours  traMADol 25 milliGRAM(s) Oral every 4 hours PRN Moderate Pain (4 - 6)  traMADol 50 milliGRAM(s) Oral every 4 hours PRN Severe Pain (7 - 10)    Cardiac:  losartan 50 milliGRAM(s) Oral daily    Pulm:    GI/:  polyethylene glycol 3350 17 Gram(s) Oral two times a day  senna 2 Tablet(s) Oral at bedtime    Other:   atorvastatin 10 milliGRAM(s) Oral at bedtime  bacitracin   Ointment 1 Application(s) Topical daily  enoxaparin Injectable 40 milliGRAM(s) SubCutaneous <User Schedule>  insulin glargine Injectable (LANTUS) 5 Unit(s) SubCutaneous at bedtime  insulin lispro (ADMELOG) corrective regimen sliding scale   SubCutaneous Before meals and at bedtime    DIET: [] Regular [] CCD [] Renal [] Puree [] Dysphagia [] Tube Feeds:   regular   IMAGING:   ACC: [id number] EXAM:  CT PELVIS ONLY   ORDERED BY: RETA OLGUIN     PROCEDURE DATE:  02/27/2025          INTERPRETATION:  CT PELVIS    HISTORY: Status post sacrectomy for sacral notochordal tumor.    TECHNIQUE: Contiguous axial imaging was performedthrough the pelvis   without contrast.  Coronal and sagittal reformatting was utilized.    COMPARISON: Intraoperative x-rays dated 2/26/2025. CT-guided biopsy dated   12/5/2024. CT abdomen and pelvis dated 10/15/2024.    FINDINGS:    OSSEOUS STRUCTURES    Fractures:  Mild healed deformity of the right pubic rami/pubic body..    Postoperative Changes: Partial sacrectomy is present through the S2   level with bilateral sacroiliac screws and interlocking zoë. 2 soft   tissue drains are present with postsurgical soft tissue change.    VISUALIZED SPINE  Mild lower lumbar degenerative disc disease.    PELVIC JOINTS    Sacroiliac Joints:  Mild arthrosis bilaterally with sacral iliac screws.    Symphysis Pubis:  Mild arthrosis.    RIGHT HIP JOINT    Avascular Necrosis:  None.    Joint Space:  Relatively maintained although tiny osteophytes are   present.    Effusion/Synovitis:  None.    LEFT HIP JOINT    Avascular Necrosis:  None.    Joint Space:  Relatively maintained although tiny osteophytes are   present.    Effusion/Synovitis:  None.    SOFT TISSUES    Neurovascular:  Unremarkable.    Pelvis/Abdomen:  Terry catheter within the bladder.    Musculature:  Post surgical changes.    Subcutaneous Tissues:  Postsurgical changes with a few scattered gas   locules and 2 soft tissue drains.    IMPRESSION:  1.  Partial sacrectomy changes are again seen with bilateral sacroiliac   screws.    --- End of Report ---            OWEN MEDRANO MD; Attending Radiologist  This document has been electronically signed. Feb 27 2025  4:00PM

## 2025-03-02 NOTE — PROGRESS NOTE ADULT - ASSESSMENT
HPI:  49 year old female c/o lower back/ tailbone pain since September, I have had inconclusive biopsies, presents today to [facility] for scheduled distal sacrectomy for sacrococcygeal disorders on 2/26/2025. Her medical history significant for MVP (unknown last echo), hypertension, T2DM on Ozempic, previous h/o anemia. She denies any pain at present, no change in her bowel habits.    ***Her last dose of  Ozempic was 2/3/2025. (10 Feb 2025 15:21)    PROCEDURE: Sacrectomy for neoplasm     s/p Posterior S2 Sacrectomy  Sacrifice of b/l S4 and S5 nerve root on 2/26     POD#  4  PLAN:  Neuro:   1 Out of bed   2 sacrectomy protocol   3 prn pain meds   4 robaxin for muscle spasm   5 continue gabapentin   6 monitor drain output     Respiratory:   1 room air   2 incentive spirometry     CV:  1 blood pressure stable     Endocrine:   1 DM - a1c 5.6   2 continue lantus 5u and sliding scale     Heme/Onc:             DVT ppx: sql and scds   1 LED negative on 2/26   2 Post op blood loss anemia - monitor     Renal:   1 graham in place   2 iv lock     ID:   1 afebrile     GI:   1 ccd diet   2 miralax and senna   3 added mag citrate - small bm overnight . patient states that she is very uncomfortable and couldn't sleep overnight.   Social/Family:   Discharge planning: dc home once medically cleared     35 minute spent for patient care and answered all questions.    -will discuss with Dr. Lara   -spectra [id number]

## 2025-03-03 LAB
GLUCOSE BLDC GLUCOMTR-MCNC: 150 MG/DL — HIGH (ref 70–99)
GLUCOSE BLDC GLUCOMTR-MCNC: 154 MG/DL — HIGH (ref 70–99)
GLUCOSE BLDC GLUCOMTR-MCNC: 165 MG/DL — HIGH (ref 70–99)
GLUCOSE BLDC GLUCOMTR-MCNC: 182 MG/DL — HIGH (ref 70–99)

## 2025-03-03 RX ORDER — TRAMADOL HYDROCHLORIDE 50 MG/1
25 TABLET, FILM COATED ORAL EVERY 4 HOURS
Refills: 0 | Status: DISCONTINUED | OUTPATIENT
Start: 2025-03-03 | End: 2025-03-05

## 2025-03-03 RX ORDER — B1/B2/B3/B5/B6/B12/VIT C/FOLIC 500-0.5 MG
1 TABLET ORAL DAILY
Refills: 0 | Status: DISCONTINUED | OUTPATIENT
Start: 2025-03-03 | End: 2025-03-05

## 2025-03-03 RX ORDER — TRAMADOL HYDROCHLORIDE 50 MG/1
50 TABLET, FILM COATED ORAL EVERY 4 HOURS
Refills: 0 | Status: DISCONTINUED | OUTPATIENT
Start: 2025-03-03 | End: 2025-03-05

## 2025-03-03 RX ORDER — METHOCARBAMOL 500 MG/1
500 TABLET, FILM COATED ORAL EVERY 8 HOURS
Refills: 0 | Status: DISCONTINUED | OUTPATIENT
Start: 2025-03-03 | End: 2025-03-05

## 2025-03-03 RX ADMIN — TRAMADOL HYDROCHLORIDE 50 MILLIGRAM(S): 50 TABLET, FILM COATED ORAL at 07:10

## 2025-03-03 RX ADMIN — Medication 100 MILLIGRAM(S): at 06:15

## 2025-03-03 RX ADMIN — Medication 100 MILLIGRAM(S): at 13:04

## 2025-03-03 RX ADMIN — TRAMADOL HYDROCHLORIDE 50 MILLIGRAM(S): 50 TABLET, FILM COATED ORAL at 21:49

## 2025-03-03 RX ADMIN — GABAPENTIN 100 MILLIGRAM(S): 400 CAPSULE ORAL at 13:03

## 2025-03-03 RX ADMIN — Medication 100 MILLIGRAM(S): at 21:30

## 2025-03-03 RX ADMIN — GABAPENTIN 100 MILLIGRAM(S): 400 CAPSULE ORAL at 21:31

## 2025-03-03 RX ADMIN — ATORVASTATIN CALCIUM 10 MILLIGRAM(S): 80 TABLET, FILM COATED ORAL at 21:31

## 2025-03-03 RX ADMIN — INSULIN LISPRO 2: 100 INJECTION, SOLUTION INTRAVENOUS; SUBCUTANEOUS at 08:15

## 2025-03-03 RX ADMIN — INSULIN LISPRO 2: 100 INJECTION, SOLUTION INTRAVENOUS; SUBCUTANEOUS at 21:30

## 2025-03-03 RX ADMIN — TRAMADOL HYDROCHLORIDE 50 MILLIGRAM(S): 50 TABLET, FILM COATED ORAL at 00:07

## 2025-03-03 RX ADMIN — METHOCARBAMOL 500 MILLIGRAM(S): 500 TABLET, FILM COATED ORAL at 06:14

## 2025-03-03 RX ADMIN — METHOCARBAMOL 500 MILLIGRAM(S): 500 TABLET, FILM COATED ORAL at 21:31

## 2025-03-03 RX ADMIN — TRAMADOL HYDROCHLORIDE 50 MILLIGRAM(S): 50 TABLET, FILM COATED ORAL at 14:02

## 2025-03-03 RX ADMIN — ENOXAPARIN SODIUM 40 MILLIGRAM(S): 100 INJECTION SUBCUTANEOUS at 17:18

## 2025-03-03 RX ADMIN — GABAPENTIN 100 MILLIGRAM(S): 400 CAPSULE ORAL at 06:14

## 2025-03-03 RX ADMIN — INSULIN GLARGINE-YFGN 5 UNIT(S): 100 INJECTION, SOLUTION SUBCUTANEOUS at 21:31

## 2025-03-03 RX ADMIN — TRAMADOL HYDROCHLORIDE 50 MILLIGRAM(S): 50 TABLET, FILM COATED ORAL at 13:02

## 2025-03-03 RX ADMIN — TRAMADOL HYDROCHLORIDE 50 MILLIGRAM(S): 50 TABLET, FILM COATED ORAL at 20:49

## 2025-03-03 RX ADMIN — Medication 1 TABLET(S): at 11:13

## 2025-03-03 RX ADMIN — INSULIN LISPRO 2: 100 INJECTION, SOLUTION INTRAVENOUS; SUBCUTANEOUS at 12:44

## 2025-03-03 RX ADMIN — Medication 500 MILLIGRAM(S): at 11:13

## 2025-03-03 NOTE — DIETITIAN INITIAL EVALUATION ADULT - EDUCATION DIETARY MODIFICATIONS
New order placed for MRI brain.     Writer informed pt the MRI order is placed.  She understood, no further questions.     teach back/(2) meets goals/outcomes/verbalization

## 2025-03-03 NOTE — DIETITIAN INITIAL EVALUATION ADULT - ORAL INTAKE PTA/DIET HISTORY
skips breakfast, tuna sandwich for lunch, dinner varies. snacks vary. skips breakfast, tuna sandwich or left overs from the night before for lunch, dinner varies. snacks vary she could not be specific about dinner and snacks.

## 2025-03-03 NOTE — DIETITIAN INITIAL EVALUATION ADULT - NSICDXPASTMEDICALHX_GEN_ALL_CORE_FT
PAST MEDICAL HISTORY:  Disorder of sacrococcygeal spine     H/O diabetes mellitus     H/O mitral valve prolapse     Hypertension     Uterine leiomyoma, unspecified location      Warm

## 2025-03-03 NOTE — PROGRESS NOTE ADULT - ASSESSMENT
JANINA JAZ is a 49yFemale s/p distal sacrectomy with closure by PRS.     - Monitor drain outputs  - Prevena removed  - Pain control  - Advance diet as tolerated, ensure pt taking adequate protein  - Appreciate rest of care per primary team    Plastic Surgery   [facility]: [id number]  [facility]: [phone]

## 2025-03-03 NOTE — DIETITIAN INITIAL EVALUATION ADULT - PERTINENT MEDS FT
MEDICATIONS  (STANDING):  atorvastatin 10 milliGRAM(s) Oral at bedtime  ceFAZolin   IVPB 2000 milliGRAM(s) IV Intermittent every 8 hours  enoxaparin Injectable 40 milliGRAM(s) SubCutaneous <User Schedule>  gabapentin 100 milliGRAM(s) Oral every 8 hours  insulin glargine Injectable (LANTUS) 5 Unit(s) SubCutaneous at bedtime  insulin lispro (ADMELOG) corrective regimen sliding scale   SubCutaneous Before meals and at bedtime  losartan 50 milliGRAM(s) Oral daily  methocarbamol 500 milliGRAM(s) Oral every 6 hours  polyethylene glycol 3350 17 Gram(s) Oral daily  senna 2 Tablet(s) Oral at bedtime    MEDICATIONS  (PRN):  acetaminophen     Tablet .. 650 milliGRAM(s) Oral every 6 hours PRN Temp greater or equal to 38C (100.4F), Mild Pain (1 - 3)  traMADol 50 milliGRAM(s) Oral every 4 hours PRN Severe Pain (7 - 10)  traMADol 25 milliGRAM(s) Oral every 4 hours PRN Moderate Pain (4 - 6)

## 2025-03-03 NOTE — DIETITIAN INITIAL EVALUATION ADULT - PERTINENT LABORATORY DATA
03-02    135  |  93[L]  |  12  ----------------------------<  252[H]  4.0   |  30  |  0.41[L]    Ca    8.7      02 Mar 2025 06:21    POCT Blood Glucose.: 182 mg/dL (03-03-25 @ 07:57)  A1C with Estimated Average Glucose Result: 5.6 % (02-10-25 @ 17:30)

## 2025-03-03 NOTE — PROGRESS NOTE ADULT - ASSESSMENT
ASSESSMENT: 49 year old female c/o lower back/ tailbone pain since September, I have had inconclusive biopsies, [facility] for scheduled distal sacrectomy for sacrococcygeal disorders on 2/26/2025. Her medical history significant for MVP (unknown last echo), hypertension, T2DM on Ozempic, previous h/o anemia. She denies any pain at present, no change in her bowel habits. ***Her last dose of  Ozempic was 2/3/2025. (10 Feb 2025 15:21)  s/p Posterior S2 Sacrectomy, Sacrifice of b/l S4 and S5 nerve root, Sacro-illiac Instrumentation 2/26, w/ plastics closure (Dr. Nicole)     NEURO: Neurochecks q4h   plastics closure, 2 surgical JOVANNA drains R 130cc, L 95 cc output in 24H. Prevena vac discontinued today   Pain control with PRN medications: Tylenol, tramadol, robaxin  continue gabapentin 100mg q8h   Strict sacrectomy protocol - no meds per rectum. Frequent offloading   PT/OT rec outpatient PT upon discharge     PULM: on room air, sat >92%   Incentive spirometer encouraged, mobilize as tolerated    CV: -160 mmHg   h/o HTN continue home med losartan   continue statin     RENAL: IVL   cleared for removal of graham - follow up TOV. frequent bladder scans w/ straight cath prn   replete electrolytes prn     GI: regular diet   continue bowel regimen (no meds KS) - constipation resolved s/p mag citrate. LBM today     ENDO: Goal euglycemia (-180)  continue lantus 5U QHS while admitted     HEME/ONC: h/h stable   VTE prophylaxis: [x] SCDs , LED neg for DVT 2/26    ID: received rubin-op antibiotics, continued on ancef while drains are in per plastics    Will discuss with Dr. Lara   contact via TEAMS preferred  ASSESSMENT: 49 year old female c/o lower back/ tailbone pain since September, I have had inconclusive biopsies, presents today to [facility] for scheduled distal sacrectomy for sacrococcygeal disorders on 2/26/2025. Her medical history significant for MVP (unknown last echo), hypertension, T2DM on Ozempic, previous h/o anemia. She denies any pain at present, no change in her bowel habits. ***Her last dose of  Ozempic was 2/3/2025. (10 Feb 2025 15:21)  s/p Posterior S2 Sacrectomy, Sacrifice of b/l S4 and S5 nerve root, Sacro-illiac Instrumentation 2/26, w/ plastics closure (Dr. Nicole)     NEURO: Neurochecks q4h   plastics closure, 2 surgical JOVANNA drains R 130cc, L 95 cc output in 24H. Prevena vac discontinued today   Pain control with PRN medications: Tylenol, tramadol, robaxin  continue gabapentin 100mg q8h   Strict sacrectomy protocol - no meds per rectum. Frequent offloading   PT/OT rec outpatient PT upon discharge     PULM: on room air, sat >92%   Incentive spirometer encouraged, mobilize as tolerated    CV: -160 mmHg   h/o HTN continue home med losartan   continue statin     RENAL: IVL   cleared for removal of graham - follow up TOV. frequent bladder scans w/ straight cath prn   replete electrolytes prn     GI: regular diet   continue bowel regimen (no meds NV) - constipation resolved s/p mag citrate. LBM today  probiotic ordered while on iv abx     ENDO: Goal euglycemia (-180)  continue lantus 5U QHS while admitted     HEME/ONC: h/h stable   VTE prophylaxis: [x] SCDs , LED neg for DVT 2/26    ID: received rubin-op antibiotics, continued on ancef while drains are in per plastics.     Will discuss with Dr. Lara   contact via TEAMS preferred

## 2025-03-03 NOTE — PROGRESS NOTE ADULT - SUBJECTIVE AND OBJECTIVE BOX
Plastic Surgery Progress Note (pg [facility]: [id number], [facility]: [phone])    SUBJECTIVE  The patient was seen and examined. No acute events overnight. Pain controlled, afebrile w/ stable vitals. Prevena removed.    OBJECTIVE  ___________________________________________________  VITAL SIGNS / I&O's   Vital Signs Last 24 Hrs  T(C): 36.7 (03 Mar 2025 04:25), Max: 36.9 (02 Mar 2025 11:54)  T(F): 98 (03 Mar 2025 04:25), Max: 98.4 (02 Mar 2025 11:54)  HR: 87 (03 Mar 2025 04:25) (60 - 87)  BP: 107/70 (03 Mar 2025 04:25) (107/68 - 119/74)  BP(mean): --  RR: 18 (03 Mar 2025 04:25) (18 - 18)  SpO2: 100% (03 Mar 2025 04:25) (96% - 100%)    Parameters below as of 03 Mar 2025 04:25  Patient On (Oxygen Delivery Method): room air          02 Mar 2025 07:01  -  03 Mar 2025 07:00  --------------------------------------------------------  IN:    Oral Fluid: 1220 mL  Total IN: 1220 mL    OUT:    Drain (mL): 130 mL    Drain (mL): 95 mL    Indwelling Catheter - Urethral (mL): 1550 mL  Total OUT: 1775 mL    Total NET: -555 mL        ___________________________________________________  PHYSICAL EXAM    -- CONSTITUTIONAL: NAD, lying in bed  -- NEURO: Awake, alert  -- HEENT: NC/AT, mucous membranes moist  -- NECK: Soft, no asymmetry  -- PULM: Non-labored respirations, equal chest rise bilaterally  -- BACK: Soft, flat. Prevena removed. JPx2 SS.  -- EXTREMITIES: Warm and well perfused  -- PSYCH: Affect normal, A&Ox3      ___________________________________________________  LABS                        9.5    7.67  )-----------( 200      ( 02 Mar 2025 06:28 )             28.2     02 Mar 2025 06:21    135    |  93     |  12     ----------------------------<  252    4.0     |  30     |  0.41     Ca    8.7        02 Mar 2025 06:21        CAPILLARY BLOOD GLUCOSE      POCT Blood Glucose.: 151 mg/dL (02 Mar 2025 21:06)  POCT Blood Glucose.: 180 mg/dL (02 Mar 2025 17:33)  POCT Blood Glucose.: 128 mg/dL (02 Mar 2025 12:39)  POCT Blood Glucose.: 122 mg/dL (02 Mar 2025 07:59)        Urinalysis Basic - ( 02 Mar 2025 06:21 )    Color: x / Appearance: x / SG: x / pH: x  Gluc: 252 mg/dL / Ketone: x  / Bili: x / Urobili: x   Blood: x / Protein: x / Nitrite: x   Leuk Esterase: x / RBC: x / WBC x   Sq Epi: x / Non Sq Epi: x / Bacteria: x      ___________________________________________________  MICRO  Recent Cultures:    ___________________________________________________  MEDICATIONS  (STANDING):  atorvastatin 10 milliGRAM(s) Oral at bedtime  ceFAZolin   IVPB 2000 milliGRAM(s) IV Intermittent every 8 hours  enoxaparin Injectable 40 milliGRAM(s) SubCutaneous <User Schedule>  gabapentin 100 milliGRAM(s) Oral every 8 hours  insulin glargine Injectable (LANTUS) 5 Unit(s) SubCutaneous at bedtime  insulin lispro (ADMELOG) corrective regimen sliding scale   SubCutaneous Before meals and at bedtime  losartan 50 milliGRAM(s) Oral daily  methocarbamol 500 milliGRAM(s) Oral every 6 hours  polyethylene glycol 3350 17 Gram(s) Oral daily  senna 2 Tablet(s) Oral at bedtime    MEDICATIONS  (PRN):  acetaminophen     Tablet .. 650 milliGRAM(s) Oral every 6 hours PRN Temp greater or equal to 38C (100.4F), Mild Pain (1 - 3)  traMADol 25 milliGRAM(s) Oral every 4 hours PRN Moderate Pain (4 - 6)  traMADol 50 milliGRAM(s) Oral every 4 hours PRN Severe Pain (7 - 10)

## 2025-03-03 NOTE — PROGRESS NOTE ADULT - SUBJECTIVE AND OBJECTIVE BOX
SUBJECTIVE: patient evaluated this AM, comfortable appearing in bed laying on R side offloading incision. constipation now resolved s/p mag citrate. +graham will of void today close monitoring for urinary retention. vitals and labs reviewed, stable   - discussed with patient strict sacrectomy protocol frequent incision off loading, no medications per rectum. sign above bed    Vital Signs Last 24 Hrs  T(C): 36.8 (03-03-25 @ 08:29), Max: 36.9 (03-02-25 @ 11:54)  T(F): 98.3 (03-03-25 @ 08:29), Max: 98.4 (03-02-25 @ 11:54)  HR: 83 (03-03-25 @ 08:29) (71 - 87)  BP: 111/74 (03-03-25 @ 08:29) (107/68 - 119/74)  BP(mean): --  RR: 18 (03-03-25 @ 08:29) (18 - 18)  SpO2: 97% (03-03-25 @ 08:29) (96% - 100%)    PHYSICAL EXAM:  Constitutional: No Acute Distress   Neurological: Awake/alert   Incision:   Drains:   Pulmonary: Clear lungs  Cardiovascular: Regular rate and rhythm   Gastrointestinal: Soft, Non-tender, Non-distended, +bowel sounds x 4  Extremities: No calf tenderness bilaterally, no edema      LABS:                     9.5    7.67  )-----------( 200      ( 02 Mar 2025 06:28 )             28.2      03-02  135  |  93[L]  |  12  ----------------------------<  252[H]  4.0   |  30  |  0.41[L]  Ca    8.7      02 Mar 2025 06:21    03-02 @ 07:01  -  03-03 @ 07:00  --------------------------------------------------------  IN: 1220 mL / OUT: 1775 mL / NET: -555 mL    IMAGING:   < from: CT Abdomen and Pelvis w/ IV Cont (03.02.25 @ 18:38) >  ACC: [id number] EXAM:  CT ABDOMEN AND PELVIS IC   ORDERED BY: MONSERRAT JONES   PROCEDURE DATE:  03/02/2025    INTERPRETATION:  CLINICAL INFORMATION: Status post sphincterectomy.   Constipation abdominal pain is stuck sensation and rectum. Evaluate for stool burden.  COMPARISON: CT abdomen pelvis 10/15/2024.    CONTRAST/COMPLICATIONS:  IV Contrast: Omnipaque 350  90 cc administered   10 cc discarded  Oral Contrast: NONE    PROCEDURE:  CT of the Abdomen and Pelvis was performed.  Sagittal and coronal reformats were performed.    FINDINGS:  LOWER CHEST: Within normal limits.    LIVER: Within normal limits.  BILE DUCTS: Mild intrahepatic biliary duct dilation, compatible with   history of cholecystectomy. No extrahepatic biliary duct dilation.  GALLBLADDER: Cholecystectomy.  SPLEEN: Within normal limits.  PANCREAS: Within normal limits.  ADRENALS: Within normal limits.  KIDNEYS/URETERS: Symmetric renal enhancement. No hydronephrosis. 0.7 cm   left nonobstructing renal calculus.    BLADDER: Graham catheter. Nondependent focus of air.  REPRODUCTIVE ORGANS: Hysterectomy.    BOWEL: No bowel obstruction. Appendix is normal. Mild colonic stool   burden. Circumferential rectal wall thickening with trace sacral edema.  PERITONEUM/RETROPERITONEUM: Within normal limits.  VESSELS: Minimal atherosclerotic changes.  LYMPH NODES: No lymphadenopathy.  ABDOMINAL WALL: Tiny fat and fluid containing umbilical hernia.  BONES: Status post sacrectomy and placement of bilateral sacroiliac   syndesmotic screws and interlocking zoë. Otherwise bones are within   normal limits.    IMPRESSION:  Mild colonic stool burden.  Circumferential rectal wall thickening with trace presacral edema,   compatible with proctitis.      --- End of Report ---  ROSALIA MADISON MD; Resident Radiologist  This document has been electronically signed.  TONI LUBIN MD; Attending Radiologist  This document has been electronically signed. Mar  3 2025  8:46AM  < end of copied text >    MEDICATIONS  (STANDING):  ascorbic acid 500 milliGRAM(s) Oral daily  atorvastatin 10 milliGRAM(s) Oral at bedtime  ceFAZolin   IVPB 2000 milliGRAM(s) IV Intermittent every 8 hours  enoxaparin Injectable 40 milliGRAM(s) SubCutaneous <User Schedule>  gabapentin 100 milliGRAM(s) Oral every 8 hours  insulin glargine Injectable (LANTUS) 5 Unit(s) SubCutaneous at bedtime  insulin lispro (ADMELOG) corrective regimen sliding scale   SubCutaneous Before meals and at bedtime  losartan 50 milliGRAM(s) Oral daily  methocarbamol 500 milliGRAM(s) Oral every 6 hours  multivitamin 1 Tablet(s) Oral daily  polyethylene glycol 3350 17 Gram(s) Oral daily  senna 2 Tablet(s) Oral at bedtime    MEDICATIONS  (PRN):  acetaminophen     Tablet .. 650 milliGRAM(s) Oral every 6 hours PRN Temp greater or equal to 38C (100.4F), Mild Pain (1 - 3)  traMADol 25 milliGRAM(s) Oral every 4 hours PRN Moderate Pain (4 - 6)  traMADol 50 milliGRAM(s) Oral every 4 hours PRN Severe Pain (7 - 10)    DIET: reg   SUBJECTIVE: patient evaluated this AM, comfortable appearing in bed laying on R side offloading incision. constipation now resolved s/p mag citrate. +graham will of void today close monitoring for urinary retention. vitals and labs reviewed, stable   - discussed with patient strict sacrectomy protocol frequent incision off loading, no medications per rectum. sign above bed    Vital Signs Last 24 Hrs  T(C): 36.8 (03-03-25 @ 08:29), Max: 36.9 (03-02-25 @ 11:54)  T(F): 98.3 (03-03-25 @ 08:29), Max: 98.4 (03-02-25 @ 11:54)  HR: 83 (03-03-25 @ 08:29) (71 - 87)  BP: 111/74 (03-03-25 @ 08:29) (107/68 - 119/74)  BP(mean): --  RR: 18 (03-03-25 @ 08:29) (18 - 18)  SpO2: 97% (03-03-25 @ 08:29) (96% - 100%)    PHYSICAL EXAM:  Constitutional: No Acute Distress   Neurological: Awake/alert, oriented x3 (person, place and time), EOMI, PERRL 3mm briskly reactive, b/l upper extremities 5/5 strength, b/l LE symmetric 5/5 strength when isolating all muscle groups, no lower extremity sensory loss, endorsing some saddle numbness posteriorly   Incision: sacral incision closed w/ sutures, incision is clean, dry and intact   Drains: 2 surgical JOVANNA drains with serosanguineous output   Pulmonary: Clear lungs  Cardiovascular: Regular rate and rhythm   Gastrointestinal: Soft, Non-tender, Non-distended, +bowel sounds x 4  Extremities: No calf tenderness bilaterally, no edema      LABS:                     9.5    7.67  )-----------( 200      ( 02 Mar 2025 06:28 )             28.2      03-02  135  |  93[L]  |  12  ----------------------------<  252[H]  4.0   |  30  |  0.41[L]  Ca    8.7      02 Mar 2025 06:21    03-02 @ 07:01  -  03-03 @ 07:00  --------------------------------------------------------  IN: 1220 mL / OUT: 1775 mL / NET: -555 mL    IMAGING:   < from: CT Abdomen and Pelvis w/ IV Cont (03.02.25 @ 18:38) >  ACC: [id number] EXAM:  CT ABDOMEN AND PELVIS IC   ORDERED BY: MONSERRAT JONES   PROCEDURE DATE:  03/02/2025    INTERPRETATION:  CLINICAL INFORMATION: Status post sphincterectomy.   Constipation abdominal pain is stuck sensation and rectum. Evaluate for stool burden.  COMPARISON: CT abdomen pelvis 10/15/2024.    CONTRAST/COMPLICATIONS:  IV Contrast: Omnipaque 350  90 cc administered   10 cc discarded  Oral Contrast: NONE    PROCEDURE:  CT of the Abdomen and Pelvis was performed.  Sagittal and coronal reformats were performed.    FINDINGS:  LOWER CHEST: Within normal limits.    LIVER: Within normal limits.  BILE DUCTS: Mild intrahepatic biliary duct dilation, compatible with   history of cholecystectomy. No extrahepatic biliary duct dilation.  GALLBLADDER: Cholecystectomy.  SPLEEN: Within normal limits.  PANCREAS: Within normal limits.  ADRENALS: Within normal limits.  KIDNEYS/URETERS: Symmetric renal enhancement. No hydronephrosis. 0.7 cm   left nonobstructing renal calculus.    BLADDER: Graham catheter. Nondependent focus of air.  REPRODUCTIVE ORGANS: Hysterectomy.    BOWEL: No bowel obstruction. Appendix is normal. Mild colonic stool   burden. Circumferential rectal wall thickening with trace sacral edema.  PERITONEUM/RETROPERITONEUM: Within normal limits.  VESSELS: Minimal atherosclerotic changes.  LYMPH NODES: No lymphadenopathy.  ABDOMINAL WALL: Tiny fat and fluid containing umbilical hernia.  BONES: Status post sacrectomy and placement of bilateral sacroiliac   syndesmotic screws and interlocking zoë. Otherwise bones are within   normal limits.    IMPRESSION:  Mild colonic stool burden.  Circumferential rectal wall thickening with trace presacral edema,   compatible with proctitis.      --- End of Report ---  ROSALIA MADISON MD; Resident Radiologist  This document has been electronically signed.  TONI LUBIN MD; Attending Radiologist  This document has been electronically signed. Mar  3 2025  8:46AM  < end of copied text >    MEDICATIONS  (STANDING):  ascorbic acid 500 milliGRAM(s) Oral daily  atorvastatin 10 milliGRAM(s) Oral at bedtime  ceFAZolin   IVPB 2000 milliGRAM(s) IV Intermittent every 8 hours  enoxaparin Injectable 40 milliGRAM(s) SubCutaneous <User Schedule>  gabapentin 100 milliGRAM(s) Oral every 8 hours  insulin glargine Injectable (LANTUS) 5 Unit(s) SubCutaneous at bedtime  insulin lispro (ADMELOG) corrective regimen sliding scale   SubCutaneous Before meals and at bedtime  losartan 50 milliGRAM(s) Oral daily  methocarbamol 500 milliGRAM(s) Oral every 6 hours  multivitamin 1 Tablet(s) Oral daily  polyethylene glycol 3350 17 Gram(s) Oral daily  senna 2 Tablet(s) Oral at bedtime    MEDICATIONS  (PRN):  acetaminophen     Tablet .. 650 milliGRAM(s) Oral every 6 hours PRN Temp greater or equal to 38C (100.4F), Mild Pain (1 - 3)  traMADol 25 milliGRAM(s) Oral every 4 hours PRN Moderate Pain (4 - 6)  traMADol 50 milliGRAM(s) Oral every 4 hours PRN Severe Pain (7 - 10)    DIET: reg

## 2025-03-03 NOTE — DIETITIAN INITIAL EVALUATION ADULT - NS FNS DIET ORDER
Diet, Consistent Carbohydrate/No Snacks:   Supplement Feeding Modality:  Oral  Probiotic Yogurt/Smoothie Cans or Servings Per Day:  2       Frequency:  Daily (02-27-25 @ 13:45) [Active]

## 2025-03-04 LAB
GLUCOSE BLDC GLUCOMTR-MCNC: 122 MG/DL — HIGH (ref 70–99)
GLUCOSE BLDC GLUCOMTR-MCNC: 154 MG/DL — HIGH (ref 70–99)
GLUCOSE BLDC GLUCOMTR-MCNC: 183 MG/DL — HIGH (ref 70–99)
GLUCOSE BLDC GLUCOMTR-MCNC: 186 MG/DL — HIGH (ref 70–99)

## 2025-03-04 RX ADMIN — INSULIN LISPRO 2: 100 INJECTION, SOLUTION INTRAVENOUS; SUBCUTANEOUS at 21:24

## 2025-03-04 RX ADMIN — Medication 100 MILLIGRAM(S): at 05:34

## 2025-03-04 RX ADMIN — METHOCARBAMOL 500 MILLIGRAM(S): 500 TABLET, FILM COATED ORAL at 05:34

## 2025-03-04 RX ADMIN — Medication 2 TABLET(S): at 21:23

## 2025-03-04 RX ADMIN — Medication 100 MILLIGRAM(S): at 13:10

## 2025-03-04 RX ADMIN — POLYETHYLENE GLYCOL 3350 17 GRAM(S): 17 POWDER, FOR SOLUTION ORAL at 11:59

## 2025-03-04 RX ADMIN — GABAPENTIN 100 MILLIGRAM(S): 400 CAPSULE ORAL at 21:23

## 2025-03-04 RX ADMIN — METHOCARBAMOL 500 MILLIGRAM(S): 500 TABLET, FILM COATED ORAL at 13:09

## 2025-03-04 RX ADMIN — INSULIN LISPRO 2: 100 INJECTION, SOLUTION INTRAVENOUS; SUBCUTANEOUS at 12:45

## 2025-03-04 RX ADMIN — TRAMADOL HYDROCHLORIDE 50 MILLIGRAM(S): 50 TABLET, FILM COATED ORAL at 01:48

## 2025-03-04 RX ADMIN — LOSARTAN POTASSIUM 50 MILLIGRAM(S): 100 TABLET, FILM COATED ORAL at 05:34

## 2025-03-04 RX ADMIN — TRAMADOL HYDROCHLORIDE 50 MILLIGRAM(S): 50 TABLET, FILM COATED ORAL at 23:23

## 2025-03-04 RX ADMIN — INSULIN LISPRO 2: 100 INJECTION, SOLUTION INTRAVENOUS; SUBCUTANEOUS at 17:20

## 2025-03-04 RX ADMIN — TRAMADOL HYDROCHLORIDE 50 MILLIGRAM(S): 50 TABLET, FILM COATED ORAL at 18:20

## 2025-03-04 RX ADMIN — METHOCARBAMOL 500 MILLIGRAM(S): 500 TABLET, FILM COATED ORAL at 21:23

## 2025-03-04 RX ADMIN — ENOXAPARIN SODIUM 40 MILLIGRAM(S): 100 INJECTION SUBCUTANEOUS at 17:20

## 2025-03-04 RX ADMIN — GABAPENTIN 100 MILLIGRAM(S): 400 CAPSULE ORAL at 05:34

## 2025-03-04 RX ADMIN — TRAMADOL HYDROCHLORIDE 50 MILLIGRAM(S): 50 TABLET, FILM COATED ORAL at 08:30

## 2025-03-04 RX ADMIN — INSULIN GLARGINE-YFGN 5 UNIT(S): 100 INJECTION, SOLUTION SUBCUTANEOUS at 21:27

## 2025-03-04 RX ADMIN — TRAMADOL HYDROCHLORIDE 50 MILLIGRAM(S): 50 TABLET, FILM COATED ORAL at 22:23

## 2025-03-04 RX ADMIN — TRAMADOL HYDROCHLORIDE 50 MILLIGRAM(S): 50 TABLET, FILM COATED ORAL at 07:30

## 2025-03-04 RX ADMIN — ATORVASTATIN CALCIUM 10 MILLIGRAM(S): 80 TABLET, FILM COATED ORAL at 21:23

## 2025-03-04 RX ADMIN — TRAMADOL HYDROCHLORIDE 50 MILLIGRAM(S): 50 TABLET, FILM COATED ORAL at 17:20

## 2025-03-04 RX ADMIN — GABAPENTIN 100 MILLIGRAM(S): 400 CAPSULE ORAL at 13:09

## 2025-03-04 RX ADMIN — Medication 100 MILLIGRAM(S): at 21:28

## 2025-03-04 RX ADMIN — Medication 500 MILLIGRAM(S): at 11:59

## 2025-03-04 RX ADMIN — Medication 1 TABLET(S): at 11:59

## 2025-03-04 NOTE — PROGRESS NOTE ADULT - SUBJECTIVE AND OBJECTIVE BOX
SUBJECTIVE: patient evaluated this AM, comfortable appearing in bed laying on R side offloading incision. constipation now resolved. Terry removed yesterday, voiding independently. Vitals and labs reviewed stable. Has 2 remaining surgical drains   - discussed with patient strict sacrectomy protocol frequent incision off loading, no medications per rectum. sign above bed    Vital Signs Last 24 Hrs  T(C): 36.6 (03-04-25 @ 07:40), Max: 36.9 (03-03-25 @ 13:24)  T(F): 97.9 (03-04-25 @ 07:40), Max: 98.4 (03-03-25 @ 13:24)  HR: 75 (03-04-25 @ 07:40) (72 - 91)  BP: 112/73 (03-04-25 @ 07:40) (102/64 - 116/72)  BP(mean): --  RR: 18 (03-04-25 @ 07:40) (18 - 18)  SpO2: 98% (03-04-25 @ 07:40) (94% - 99%)    PHYSICAL EXAM:  Constitutional: No Acute Distress   Neurological: Awake/alert, oriented x3 (person, place and time), EOMI, PERRL 3mm briskly reactive, b/l upper extremities 5/5 strength, b/l LE symmetric 5/5 strength when isolating all muscle groups, no lower extremity sensory loss, endorsing some saddle numbness posteriorly   Incision: sacral incision closed w/ sutures, incision is clean, dry and intact throughout   Drains: 2 surgical JOVANNA drains with serosanguineous output   Pulmonary: Clear lungs  Cardiovascular: Regular rate and rhythm   Gastrointestinal: Soft, Non-tender, Non-distended, +bowel sounds x 4  Extremities: No calf tenderness bilaterally, no edema      LABS:                     9.5    7.67  )-----------( 200      ( 02 Mar 2025 06:28 )             28.2      03-02  135  |  93[L]  |  12  ----------------------------<  252[H]  4.0   |  30  |  0.41[L]  Ca    8.7      02 Mar 2025 06:21    I&O's Summary    03 Mar 2025 07:01  -  04 Mar 2025 07:00  --------------------------------------------------------  IN: 930 mL / OUT: 1090 mL / NET: -160 mL    IMAGING:   < from: CT Abdomen and Pelvis w/ IV Cont (03.02.25 @ 18:38) >  ACC: [id number] EXAM:  CT ABDOMEN AND PELVIS IC   ORDERED BY: MONSERRAT JONES   PROCEDURE DATE:  03/02/2025    INTERPRETATION:  CLINICAL INFORMATION: Status post sphincterectomy.   Constipation abdominal pain is stuck sensation and rectum. Evaluate for stool burden.  COMPARISON: CT abdomen pelvis 10/15/2024.    CONTRAST/COMPLICATIONS:  IV Contrast: Omnipaque 350  90 cc administered   10 cc discarded  Oral Contrast: NONE    PROCEDURE:  CT of the Abdomen and Pelvis was performed.  Sagittal and coronal reformats were performed.    FINDINGS:  LOWER CHEST: Within normal limits.    LIVER: Within normal limits.  BILE DUCTS: Mild intrahepatic biliary duct dilation, compatible with   history of cholecystectomy. No extrahepatic biliary duct dilation.  GALLBLADDER: Cholecystectomy.  SPLEEN: Within normal limits.  PANCREAS: Within normal limits.  ADRENALS: Within normal limits.  KIDNEYS/URETERS: Symmetric renal enhancement. No hydronephrosis. 0.7 cm   left nonobstructing renal calculus.    BLADDER: Terry catheter. Nondependent focus of air.  REPRODUCTIVE ORGANS: Hysterectomy.    BOWEL: No bowel obstruction. Appendix is normal. Mild colonic stool   burden. Circumferential rectal wall thickening with trace sacral edema.  PERITONEUM/RETROPERITONEUM: Within normal limits.  VESSELS: Minimal atherosclerotic changes.  LYMPH NODES: No lymphadenopathy.  ABDOMINAL WALL: Tiny fat and fluid containing umbilical hernia.  BONES: Status post sacrectomy and placement of bilateral sacroiliac   syndesmotic screws and interlocking zoë. Otherwise bones are within   normal limits.    IMPRESSION:  Mild colonic stool burden.  Circumferential rectal wall thickening with trace presacral edema,   compatible with proctitis.      --- End of Report ---  ROSALIA MADISON MD; Resident Radiologist  This document has been electronically signed.  TONI LUBIN MD; Attending Radiologist  This document has been electronically signed. Mar  3 2025  8:46AM  < end of copied text >    MEDICATIONS  (STANDING):  ascorbic acid 500 milliGRAM(s) Oral daily  atorvastatin 10 milliGRAM(s) Oral at bedtime  ceFAZolin   IVPB 2000 milliGRAM(s) IV Intermittent every 8 hours  enoxaparin Injectable 40 milliGRAM(s) SubCutaneous <User Schedule>  gabapentin 100 milliGRAM(s) Oral every 8 hours  insulin glargine Injectable (LANTUS) 5 Unit(s) SubCutaneous at bedtime  insulin lispro (ADMELOG) corrective regimen sliding scale   SubCutaneous Before meals and at bedtime  losartan 50 milliGRAM(s) Oral daily  methocarbamol 500 milliGRAM(s) Oral every 8 hours  multivitamin 1 Tablet(s) Oral daily  polyethylene glycol 3350 17 Gram(s) Oral daily  senna 2 Tablet(s) Oral at bedtime    MEDICATIONS  (PRN):  acetaminophen     Tablet .. 650 milliGRAM(s) Oral every 6 hours PRN Temp greater or equal to 38C (100.4F), Mild Pain (1 - 3)  traMADol 25 milliGRAM(s) Oral every 4 hours PRN Moderate Pain (4 - 6)  traMADol 50 milliGRAM(s) Oral every 4 hours PRN Severe Pain (7 - 10)      DIET: reg

## 2025-03-04 NOTE — PROGRESS NOTE ADULT - ASSESSMENT
ASSESSMENT: 49 year old female c/o lower back/ tailbone pain since September, I have had inconclusive biopsies, presents today to [facility] for scheduled distal sacrectomy for sacrococcygeal disorders on 2/26/2025. Her medical history significant for MVP (unknown last echo), hypertension, T2DM on Ozempic, previous h/o anemia. She denies any pain at present, no change in her bowel habits. ***Her last dose of  Ozempic was 2/3/2025. (10 Feb 2025 15:21)  s/p Posterior S2 Sacrectomy, Sacrifice of b/l S4 and S5 nerve root, Sacro-illiac Instrumentation 2/26, w/ plastics closure (Dr. Nicole)     NEURO: Neurochecks q4h   plastics closure, 2 surgical JOVANNA drains R 115cc, L 25 cc output in 24H. Prevena vac discontinued 3/3  Pain control with PRN medications: Tylenol, tramadol, robaxin  continue gabapentin 100mg q8h   Strict sacrectomy protocol - no meds per rectum. Frequent offloading   PT/OT rec outpatient PT upon discharge     PULM: on room air, sat >92%   Incentive spirometer encouraged, mobilize as tolerated    CV: -160 mmHg   h/o HTN continue home med losartan   continue statin     RENAL: IVL   cleared for removal of graham yest 3/3, patient is voiding independently w/o retention   replete electrolytes prn     GI: regular diet   continue bowel regimen (no meds MI) - constipation resolved s/p mag citrate. LBM 3/3  probiotic ordered while on iv abx     ENDO: Goal euglycemia (-180)  continue lantus 5U QHS while admitted     HEME/ONC: h/h stable   VTE prophylaxis: [x] SCDs , LED neg for DVT 2/26    ID: received rubin-op antibiotics, continued on ancef while drains are in per plastics.     Will discuss with Dr. Lara   contact via TEAMS preferred

## 2025-03-05 ENCOUNTER — TRANSCRIPTION ENCOUNTER (OUTPATIENT)
Age: 50
End: 2025-03-05

## 2025-03-05 VITALS
TEMPERATURE: 98 F | DIASTOLIC BLOOD PRESSURE: 56 MMHG | OXYGEN SATURATION: 98 % | SYSTOLIC BLOOD PRESSURE: 102 MMHG | RESPIRATION RATE: 18 BRPM | HEART RATE: 74 BPM

## 2025-03-05 LAB
GLUCOSE BLDC GLUCOMTR-MCNC: 136 MG/DL — HIGH (ref 70–99)
GLUCOSE BLDC GLUCOMTR-MCNC: 143 MG/DL — HIGH (ref 70–99)

## 2025-03-05 PROCEDURE — 93970 EXTREMITY STUDY: CPT

## 2025-03-05 PROCEDURE — 86900 BLOOD TYPING SEROLOGIC ABO: CPT

## 2025-03-05 PROCEDURE — C1889: CPT

## 2025-03-05 PROCEDURE — 88311 DECALCIFY TISSUE: CPT

## 2025-03-05 PROCEDURE — 86901 BLOOD TYPING SEROLOGIC RH(D): CPT

## 2025-03-05 PROCEDURE — 83735 ASSAY OF MAGNESIUM: CPT

## 2025-03-05 PROCEDURE — 82962 GLUCOSE BLOOD TEST: CPT

## 2025-03-05 PROCEDURE — 97530 THERAPEUTIC ACTIVITIES: CPT

## 2025-03-05 PROCEDURE — 99261: CPT

## 2025-03-05 PROCEDURE — 86922 COMPATIBILITY TEST ANTIGLOB: CPT

## 2025-03-05 PROCEDURE — 84100 ASSAY OF PHOSPHORUS: CPT

## 2025-03-05 PROCEDURE — 88309 TISSUE EXAM BY PATHOLOGIST: CPT

## 2025-03-05 PROCEDURE — 80048 BASIC METABOLIC PNL TOTAL CA: CPT

## 2025-03-05 PROCEDURE — 97161 PT EVAL LOW COMPLEX 20 MIN: CPT

## 2025-03-05 PROCEDURE — C1737: CPT

## 2025-03-05 PROCEDURE — 86850 RBC ANTIBODY SCREEN: CPT

## 2025-03-05 PROCEDURE — 85025 COMPLETE CBC W/AUTO DIFF WBC: CPT

## 2025-03-05 PROCEDURE — 97116 GAIT TRAINING THERAPY: CPT

## 2025-03-05 PROCEDURE — 97166 OT EVAL MOD COMPLEX 45 MIN: CPT

## 2025-03-05 PROCEDURE — 72020 X-RAY EXAM OF SPINE 1 VIEW: CPT

## 2025-03-05 PROCEDURE — 72192 CT PELVIS W/O DYE: CPT | Mod: MC

## 2025-03-05 PROCEDURE — 76000 FLUOROSCOPY <1 HR PHYS/QHP: CPT

## 2025-03-05 PROCEDURE — 36415 COLL VENOUS BLD VENIPUNCTURE: CPT

## 2025-03-05 PROCEDURE — 74177 CT ABD & PELVIS W/CONTRAST: CPT | Mod: MC

## 2025-03-05 PROCEDURE — 85027 COMPLETE CBC AUTOMATED: CPT

## 2025-03-05 RX ORDER — ACETAMINOPHEN 500 MG/5ML
2 LIQUID (ML) ORAL
Qty: 0 | Refills: 0 | DISCHARGE
Start: 2025-03-05

## 2025-03-05 RX ORDER — BISACODYL 5 MG
5 TABLET, DELAYED RELEASE (ENTERIC COATED) ORAL EVERY 12 HOURS
Refills: 0 | Status: DISCONTINUED | OUTPATIENT
Start: 2025-03-05 | End: 2025-03-05

## 2025-03-05 RX ORDER — NALOXONE HYDROCHLORIDE 0.4 MG/ML
1 INJECTION, SOLUTION INTRAMUSCULAR; INTRAVENOUS; SUBCUTANEOUS
Qty: 1 | Refills: 0
Start: 2025-03-05 | End: 2025-03-05

## 2025-03-05 RX ORDER — SENNA 187 MG
2 TABLET ORAL
Qty: 0 | Refills: 0 | DISCHARGE
Start: 2025-03-05

## 2025-03-05 RX ORDER — METHOCARBAMOL 500 MG/1
1 TABLET, FILM COATED ORAL
Qty: 42 | Refills: 0
Start: 2025-03-05 | End: 2025-03-18

## 2025-03-05 RX ORDER — GABAPENTIN 400 MG/1
1 CAPSULE ORAL
Qty: 42 | Refills: 0
Start: 2025-03-05 | End: 2025-03-18

## 2025-03-05 RX ORDER — BISACODYL 5 MG
1 TABLET, DELAYED RELEASE (ENTERIC COATED) ORAL
Qty: 0 | Refills: 0 | DISCHARGE
Start: 2025-03-05

## 2025-03-05 RX ORDER — B1/B2/B3/B5/B6/B12/VIT C/FOLIC 500-0.5 MG
1 TABLET ORAL
Qty: 0 | Refills: 0 | DISCHARGE
Start: 2025-03-05

## 2025-03-05 RX ORDER — POLYETHYLENE GLYCOL 3350 17 G/17G
17 POWDER, FOR SOLUTION ORAL
Qty: 0 | Refills: 0 | DISCHARGE
Start: 2025-03-05

## 2025-03-05 RX ORDER — TRAMADOL HYDROCHLORIDE 50 MG/1
1 TABLET, FILM COATED ORAL
Qty: 28 | Refills: 0
Start: 2025-03-05 | End: 2025-03-11

## 2025-03-05 RX ADMIN — TRAMADOL HYDROCHLORIDE 50 MILLIGRAM(S): 50 TABLET, FILM COATED ORAL at 09:20

## 2025-03-05 RX ADMIN — Medication 1 TABLET(S): at 13:12

## 2025-03-05 RX ADMIN — TRAMADOL HYDROCHLORIDE 50 MILLIGRAM(S): 50 TABLET, FILM COATED ORAL at 10:20

## 2025-03-05 RX ADMIN — METHOCARBAMOL 500 MILLIGRAM(S): 500 TABLET, FILM COATED ORAL at 13:10

## 2025-03-05 RX ADMIN — TRAMADOL HYDROCHLORIDE 50 MILLIGRAM(S): 50 TABLET, FILM COATED ORAL at 05:02

## 2025-03-05 RX ADMIN — TRAMADOL HYDROCHLORIDE 50 MILLIGRAM(S): 50 TABLET, FILM COATED ORAL at 14:13

## 2025-03-05 RX ADMIN — Medication 100 MILLIGRAM(S): at 05:41

## 2025-03-05 RX ADMIN — TRAMADOL HYDROCHLORIDE 50 MILLIGRAM(S): 50 TABLET, FILM COATED ORAL at 15:13

## 2025-03-05 RX ADMIN — Medication 500 MILLIGRAM(S): at 13:12

## 2025-03-05 RX ADMIN — GABAPENTIN 100 MILLIGRAM(S): 400 CAPSULE ORAL at 13:10

## 2025-03-05 RX ADMIN — METHOCARBAMOL 500 MILLIGRAM(S): 500 TABLET, FILM COATED ORAL at 05:41

## 2025-03-05 RX ADMIN — LOSARTAN POTASSIUM 50 MILLIGRAM(S): 100 TABLET, FILM COATED ORAL at 05:41

## 2025-03-05 RX ADMIN — GABAPENTIN 100 MILLIGRAM(S): 400 CAPSULE ORAL at 05:41

## 2025-03-05 RX ADMIN — POLYETHYLENE GLYCOL 3350 17 GRAM(S): 17 POWDER, FOR SOLUTION ORAL at 13:10

## 2025-03-05 RX ADMIN — TRAMADOL HYDROCHLORIDE 50 MILLIGRAM(S): 50 TABLET, FILM COATED ORAL at 04:02

## 2025-03-05 NOTE — DISCHARGE NOTE PROVIDER - NSDCMRMEDTOKEN_GEN_ALL_CORE_FT
atorvastatin 10 mg oral tablet: 1 tab(s) orally  losartan 50 mg oral tablet: 1 tab(s) orally  Ozempic 2 mg/1.5 mL (1 mg dose) subcutaneous solution: subcutaneous once a week every Monday   acetaminophen 325 mg oral tablet: 2 tab(s) orally every 6 hours As needed Temp greater or equal to 38C (100.4F), Mild Pain (1 - 3)  ascorbic acid 500 mg oral tablet: 1 tab(s) orally once a day  atorvastatin 10 mg oral tablet: 1 tab(s) orally once a day  gabapentin 100 mg oral capsule: 1 cap(s) orally every 8 hours  losartan 50 mg oral tablet: 1 tab(s) orally once a day  methocarbamol 500 mg oral tablet: 1 tab(s) orally every 8 hours as needed for  muscle spasm  Multiple Vitamins oral tablet: 1 tab(s) orally once a day  Narcan 4 mg/0.1 mL nasal spray: 1 spray(s) intranasally once as needed for OPIOID OVERDOSE RESCUE  Ozempic 2 mg/1.5 mL (1 mg dose) subcutaneous solution: subcutaneous once a week every Monday  polyethylene glycol 3350 oral powder for reconstitution: 17 gram(s) orally once a day  senna leaf extract oral tablet: 2 tab(s) orally once a day (at bedtime)  traMADol 50 mg oral tablet: 1 tab(s) orally every 6 hours as needed for Severe Pain (7 - 10) MDD: 4 tablets   acetaminophen 325 mg oral tablet: 2 tab(s) orally every 6 hours As needed Temp greater or equal to 38C (100.4F), Mild Pain (1 - 3)  ascorbic acid 500 mg oral tablet: 1 tab(s) orally once a day  atorvastatin 10 mg oral tablet: 1 tab(s) orally once a day  bisacodyl 5 mg oral delayed release tablet: 1 tab(s) orally every 12 hours As needed Constipation  gabapentin 100 mg oral capsule: 1 cap(s) orally every 8 hours  losartan 50 mg oral tablet: 1 tab(s) orally once a day  methocarbamol 500 mg oral tablet: 1 tab(s) orally every 8 hours as needed for  muscle spasm  Multiple Vitamins oral tablet: 1 tab(s) orally once a day  Narcan 4 mg/0.1 mL nasal spray: 1 spray(s) intranasally once as needed for OPIOID OVERDOSE RESCUE  Ozempic 2 mg/1.5 mL (1 mg dose) subcutaneous solution: subcutaneous once a week every Monday  polyethylene glycol 3350 oral powder for reconstitution: 17 gram(s) orally once a day  senna leaf extract oral tablet: 2 tab(s) orally once a day (at bedtime)  traMADol 50 mg oral tablet: 1 tab(s) orally every 6 hours as needed for Severe Pain (7 - 10) MDD: 4 tablets

## 2025-03-05 NOTE — DISCHARGE NOTE PROVIDER - NSDCCPCAREPLAN_GEN_ALL_CORE_FT
PRINCIPAL DISCHARGE DIAGNOSIS  Diagnosis: Disorder of sacrococcygeal spine  Assessment and Plan of Treatment: You had the following procedure performed at [facility] on 2/26/2025: Posterior S2 Sacrectomy; Sacrifice of b/l S4 and S5 nerve root;   Sacrifice of b/l S4 and S5 nerve root; Sacro-illiac Instrumentation 2/26/2025; Plastics closure   Please follow up with Dr. Lara within 1-2 weeks   For pain you may take the following:   - Tylenol (acetaminophen) 650 mg every 6 hours as needed for mild pain 1-3/10  - Tramadol 25 mg (1/2 tablet) every 6 hours AS NEEDED for moderate pain 4-6/10  - Tramadol 50 mg (1 tablet) every 6 hours AS NEEDED for severe pain 7-10/10   - Robaxin 500 mg every 8 hours AS NEEDED for muscle spasm   DO NOT take Robaxin and oxycodone together as these two drugs together may cause co-occuring effects of lethargy, dizziness, falls etc   *****Please DO NOT take any Aspirin and NSAIDs (Advil, Aleve, Motrin, Ibuprofen) until cleared by your Neurosurgeon****. Please DO NOT do any heavy lifting, bending, twisting and straining. You have surgical SUTURES, they will be removed on follow up with your PLASTIC SURGEON. You may shower and run water over the incision; DO NOT APPLY SOAP / ointments/ or creams, DO NOT do any scrubbing. IF INCISION IS WET Pat dry only** Please come to the emergency room for any of the following: altered mental status, seizures, pain uncontrolled by pain medications, fevers, leaking / bleeding from surgical site, chest pain and shortness of breath.  You have been started on a new medication, Tramadol.  Tramadol helps to control pain. Tramadol is an additive medication so it is important to limit the use of this medication.  Side effects include nausea, vomiting, constipation, dry mouth, lightheadedness, dizziness or drowsiness.  It is important to tell your physician if you experience any of these side effects.      SECONDARY DISCHARGE DIAGNOSES  Diagnosis: Disorder of sacrococcygeal spine  Assessment and Plan of Treatment: SACRECTOMY PROTOCOL PLEASE CONTINUE THE FOLLOWING STRICT PRECAUTION:  All efforts to keep pressure off the surgical wound should be attempted.   the patient should be turned and repositioned every two hours.   Out of bed While out of bed in chair, patient can only remain in chair for 15-30 minutes  Safety NOTHING PER RECTUM (NPR). ABSOLUTELY No digital disimpaction, no enemas, no suppositories, no rectal tubes.       Diagnosis: Hypertension  Assessment and Plan of Treatment: You may continue your home medication losartan once daily   Please continue to monitor your blood pressure with a manual cuff.     PRINCIPAL DISCHARGE DIAGNOSIS  Diagnosis: Disorder of sacrococcygeal spine  Assessment and Plan of Treatment: You had the following procedure performed at [facility] on 2/26/2025: Posterior S2 Sacrectomy; Sacrifice of b/l S4 and S5 nerve root;   Sacrifice of b/l S4 and S5 nerve root; Sacro-illiac Instrumentation 2/26/2025;  Plastics closure (Dr. Nicloe)   Please follow up with Dr. Lara within 1-2 weeks   For pain you may take the following:   - Tylenol (acetaminophen) 650 mg every 6 hours as needed for mild pain 1-3/10  - Tramadol 25 mg (1/2 tablet) every 6 hours AS NEEDED for moderate pain 4-6/10  - Tramadol 50 mg (1 tablet) every 6 hours AS NEEDED for severe pain 7-10/10   - Robaxin 500 mg every 8 hours AS NEEDED for muscle spasm   DO NOT take Robaxin and oxycodone together as these two drugs together may cause co-occuring effects of lethargy, dizziness, falls etc   *****Please DO NOT take any Aspirin and NSAIDs (Advil, Aleve, Motrin, Ibuprofen) until cleared by your Neurosurgeon****. Please DO NOT do any heavy lifting, bending, twisting and straining. You have surgical SUTURES, they will be removed on follow up with your PLASTIC SURGEON Dr. Nicole. You may shower and run water over the incision; DO NOT APPLY SOAP / ointments/ or creams, DO NOT do any scrubbing. IF INCISION IS WET Pat dry only** AND LEAVE OPEN to air per plastic surgery****   Please come to the emergency room for any of the following: altered mental status, seizures, pain uncontrolled by pain medications, fevers, leaking / bleeding from surgical site, chest pain and shortness of breath.  You have been started on a new medication, Tramadol.  Tramadol helps to control pain. Tramadol is an additive medication so it is important to limit the use of this medication.  Side effects include nausea, vomiting, constipation, dry mouth, lightheadedness, dizziness or drowsiness.  It is important to tell your physician if you experience any of these side effects.      SECONDARY DISCHARGE DIAGNOSES  Diagnosis: Disorder of sacrococcygeal spine  Assessment and Plan of Treatment: SACRECTOMY PROTOCOL-- PLEASE CONTINUE THE FOLLOWING STRICT PRECAUTIONS:   All efforts to keep pressure off the surgical wound should be attempted.   Continue frequent turning and positioninged every two hours. If in bed/on couch lay on your side  While out of bed in chair, can only remain in chair for 15 minutes  Safety NOTHING PER RECTUM (NPR). ABSOLUTELY No digital disimpaction, no enemas, no suppositories, no rectal tubes    Diagnosis: Hypertension  Assessment and Plan of Treatment: You may continue your home medication losartan once daily   Please continue to monitor your blood pressure with a manual cuff.     PRINCIPAL DISCHARGE DIAGNOSIS  Diagnosis: Disorder of sacrococcygeal spine  Assessment and Plan of Treatment: You had the following procedure performed at [facility] on 2/26/2025: Posterior S2 Sacrectomy; Sacrifice of b/l S4 and S5 nerve root;   Sacrifice of b/l S4 and S5 nerve root; Sacro-illiac Instrumentation 2/26/2025;  Plastics closure (Dr. Nicole)   Please follow up with Dr. Lara within 1-2 weeks   For pain you may take the following:   - Tylenol (acetaminophen) 650 mg every 6 hours as needed for mild pain 1-3/10  - Tramadol 25 mg (1/2 tablet) every 6 hours AS NEEDED for moderate pain 4-6/10  - Tramadol 50 mg (1 tablet) every 6 hours AS NEEDED for severe pain 7-10/10   - Robaxin 500 mg every 8 hours AS NEEDED for muscle spasm   DO NOT take Robaxin and oxycodone together as these two drugs together may cause co-occuring effects of lethargy, dizziness, falls etc   *****Please DO NOT take any Aspirin and NSAIDs (Advil, Aleve, Motrin, Ibuprofen) until cleared by your Neurosurgeon****. Please DO NOT do any heavy lifting, bending, twisting and straining. You have surgical SUTURES, they will be removed on follow up with your PLASTIC SURGEON Dr. Nicole. You may shower and run water over the incision; DO NOT APPLY SOAP / ointments/ or creams, DO NOT do any scrubbing. IF INCISION IS WET Pat dry only** AND LEAVE OPEN to air per plastic surgery****   Please come to the emergency room for any of the following: altered mental status, seizures, pain uncontrolled by pain medications, fevers, leaking / bleeding from surgical site, chest pain and shortness of breath.  You have been started on a new medication, Tramadol.  Tramadol helps to control pain. Tramadol is an additive medication so it is important to limit the use of this medication.  Side effects include nausea, vomiting, constipation, dry mouth, lightheadedness, dizziness or drowsiness.  It is important to tell your physician if you experience any of these side effects.      SECONDARY DISCHARGE DIAGNOSES  Diagnosis: Disorder of sacrococcygeal spine  Assessment and Plan of Treatment: SACRECTOMY PROTOCOL-- PLEASE CONTINUE THE FOLLOWING STRICT PRECAUTIONS:   All efforts to keep pressure off the surgical wound should be attempted.   Continue frequent turning and positioning every two hours. If in bed/on couch lay on your side  While out of bed in chair, can only remain in chair for 15 minutes  Safety NOTHING PER RECTUM (NPR). ABSOLUTELY No digital disimpaction, no enemas, no suppositories, no rectal tubes  Monitor the incision regularly. Please call your plastic surgeon immediately if you notice breakdown, redness, swelling, or discharge from the wound.    Diagnosis: Hypertension  Assessment and Plan of Treatment: You may continue your home medication losartan once daily   Please continue to monitor your blood pressure with a manual cuff.     PRINCIPAL DISCHARGE DIAGNOSIS  Diagnosis: Disorder of sacrococcygeal spine  Assessment and Plan of Treatment: You had the following procedure performed at [facility] on 2/26/2025: Posterior S2 Sacrectomy; Sacrifice of b/l S4 and S5 nerve root;   Sacrifice of b/l S4 and S5 nerve root; Sacro-illiac Instrumentation 2/26/2025;  Plastics closure (Dr. Nicole)   Please follow up with Dr. Lara within 1-2 weeks   For pain you may take the following:   - Tylenol (acetaminophen) 650 mg every 6 hours as needed for mild pain 1-3/10  - Tramadol 25 mg (1/2 tablet) every 6 hours AS NEEDED for moderate pain 4-6/10  - Tramadol 50 mg (1 tablet) every 6 hours AS NEEDED for severe pain 7-10/10   - Robaxin 500 mg every 8 hours AS NEEDED for muscle spasm   DO NOT take Robaxin and oxycodone together as these two drugs together may cause co-occuring effects of lethargy, dizziness, falls etc   *****Please DO NOT take any Aspirin and NSAIDs (Advil, Aleve, Motrin, Ibuprofen) until cleared by your Neurosurgeon****. Please DO NOT do any heavy lifting, bending, twisting and straining. You have surgical SUTURES, they will be removed on follow up with your PLASTIC SURGEON Dr. Nicole. You may shower and run water over the incision; DO NOT APPLY SOAP / ointments/ or creams, DO NOT do any scrubbing. IF INCISION IS WET Pat dry only** AND LEAVE OPEN to air per plastic surgery****   Please come to the emergency room for any of the following: altered mental status, seizures, pain uncontrolled by pain medications, fevers, leaking / bleeding from surgical site, chest pain and shortness of breath.  You have been started on a new medication, Tramadol.  Tramadol helps to control pain. Tramadol is an additive medication so it is important to limit the use of this medication.  Side effects include nausea, vomiting, constipation, dry mouth, lightheadedness, dizziness or drowsiness.  It is important to tell your physician if you experience any of these side effects.      SECONDARY DISCHARGE DIAGNOSES  Diagnosis: Disorder of sacrococcygeal spine  Assessment and Plan of Treatment: SACRECTOMY PROTOCOL-- PLEASE CONTINUE THE FOLLOWING STRICT PRECAUTIONS:   All efforts to keep pressure off the surgical wound should be attempted.   Continue frequent turning and positioning every two hours. If in bed/on couch lay on your side  While out of bed in chair, can only remain in chair for 15 minutes  Safety NOTHING PER RECTUM (NPR). ABSOLUTELY No digital disimpaction, no enemas, no suppositories, no rectal tubes  Monitor the incision regularly. Please call your plastic surgeon immediately if you notice breakdown, redness, swelling, or discharge from the wound.    Diagnosis: Hypertension  Assessment and Plan of Treatment: You may continue your home medication losartan once daily   Please continue to monitor your blood pressure with a manual cuff.    Diagnosis: Prophylactic measure  Assessment and Plan of Treatment: Continue bowel regimen with miralax and senna   As needed, may take Dulcolax 5 mg every 12 hours for constipation.   Hold for diarrhea.

## 2025-03-05 NOTE — DISCHARGE NOTE NURSING/CASE MANAGEMENT/SOCIAL WORK - FINANCIAL ASSISTANCE
[facility] provides services at a reduced cost to those who are determined to be eligible through [facility]’s financial assistance program. Information regarding [facility]’s financial assistance program can be found by going to [email or web address] or by calling [phone].

## 2025-03-05 NOTE — DISCHARGE NOTE PROVIDER - REASON FOR ADMISSION
Sacrifice of b/l S4 and S5 nerve root; Sacro-illiac Instrumentation 2/26/2025   Plastics closure  Posterior S2 Sacrectomy; Sacro-illiac Instrumentation 2/26/2025   Plastics closure  Posterior S2 Sacrectomy; Sacro-illiac Instrumentation 2/26/2025   Plastics closure (Dr. Nicole)

## 2025-03-05 NOTE — PROGRESS NOTE ADULT - SUBJECTIVE AND OBJECTIVE BOX
SUBJECTIVE: patient evaluated this AM, comfortable appearing in bed laying on R side offloading incision. voiding independently. Vitals and labs stable. R JOVANNA output 30 cc in 24H   - discussed with patient strict sacrectomy protocol frequent incision off loading, no medications per rectum. sign above bed    Vital Signs Last 24 Hrs  T(C): 36.9 (03-05-25 @ 07:34), Max: 37.1 (03-04-25 @ 20:14)  T(F): 98.4 (03-05-25 @ 07:34), Max: 98.7 (03-04-25 @ 20:14)  HR: 68 (03-05-25 @ 07:34) (68 - 90)  BP: 101/66 (03-05-25 @ 07:34) (101/66 - 124/74)  BP(mean): --  RR: 18 (03-05-25 @ 07:34) (18 - 18)  SpO2: 98% (03-05-25 @ 07:34) (97% - 98%)    PHYSICAL EXAM:  Constitutional: No Acute Distress   Neurological: Awake/alert, oriented x3 (person, place and time), EOMI, PERRL 3mm briskly reactive, b/l upper extremities 5/5 strength, b/l LE symmetric 5/5 strength when isolating all muscle groups, no lower extremity sensory loss, endorsing some saddle numbness posteriorly   Incision: sacral incision closed w/ sutures, incision is clean, dry and intact throughout its entirety   Drains: 1 surgical JOVANNA drain (R sided) with serosanguineous output. Prev exit site of L JOVANNA covered w/ clean dry dressing   Pulmonary: Clear lungs  Cardiovascular: Regular rate and rhythm   Gastrointestinal: Soft, Non-tender, Non-distended, +bowel sounds x 4  Extremities: No calf tenderness bilaterally, no edema      LABS:                     9.5    7.67  )-----------( 200      ( 02 Mar 2025 06:28 )             28.2      03-02  135  |  93[L]  |  12  ----------------------------<  252[H]  4.0   |  30  |  0.41[L]  Ca    8.7      02 Mar 2025 06:21    I&O's Summary    03 Mar 2025 07:01  -  04 Mar 2025 07:00  --------------------------------------------------------  IN: 930 mL / OUT: 1090 mL / NET: -160 mL    IMAGING:   < from: CT Abdomen and Pelvis w/ IV Cont (03.02.25 @ 18:38) >  ACC: [id number] EXAM:  CT ABDOMEN AND PELVIS IC   ORDERED BY: MONSERRAT JONES   PROCEDURE DATE:  03/02/2025    INTERPRETATION:  CLINICAL INFORMATION: Status post sphincterectomy.   Constipation abdominal pain is stuck sensation and rectum. Evaluate for stool burden.  COMPARISON: CT abdomen pelvis 10/15/2024.    CONTRAST/COMPLICATIONS:  IV Contrast: Omnipaque 350  90 cc administered   10 cc discarded  Oral Contrast: NONE    PROCEDURE:  CT of the Abdomen and Pelvis was performed.  Sagittal and coronal reformats were performed.    FINDINGS:  LOWER CHEST: Within normal limits.    LIVER: Within normal limits.  BILE DUCTS: Mild intrahepatic biliary duct dilation, compatible with   history of cholecystectomy. No extrahepatic biliary duct dilation.  GALLBLADDER: Cholecystectomy.  SPLEEN: Within normal limits.  PANCREAS: Within normal limits.  ADRENALS: Within normal limits.  KIDNEYS/URETERS: Symmetric renal enhancement. No hydronephrosis. 0.7 cm   left nonobstructing renal calculus.    BLADDER: Terry catheter. Nondependent focus of air.  REPRODUCTIVE ORGANS: Hysterectomy.    BOWEL: No bowel obstruction. Appendix is normal. Mild colonic stool   burden. Circumferential rectal wall thickening with trace sacral edema.  PERITONEUM/RETROPERITONEUM: Within normal limits.  VESSELS: Minimal atherosclerotic changes.  LYMPH NODES: No lymphadenopathy.  ABDOMINAL WALL: Tiny fat and fluid containing umbilical hernia.  BONES: Status post sacrectomy and placement of bilateral sacroiliac   syndesmotic screws and interlocking zoë. Otherwise bones are within   normal limits.    IMPRESSION:  Mild colonic stool burden.  Circumferential rectal wall thickening with trace presacral edema,   compatible with proctitis.      --- End of Report ---  ROSALIA MADISON MD; Resident Radiologist  This document has been electronically signed.  TONI LUBIN MD; Attending Radiologist  This document has been electronically signed. Mar  3 2025  8:46AM  < end of copied text >    MEDICATIONS  (STANDING):  ascorbic acid 500 milliGRAM(s) Oral daily  atorvastatin 10 milliGRAM(s) Oral at bedtime  ceFAZolin   IVPB 2000 milliGRAM(s) IV Intermittent every 8 hours  enoxaparin Injectable 40 milliGRAM(s) SubCutaneous <User Schedule>  gabapentin 100 milliGRAM(s) Oral every 8 hours  insulin glargine Injectable (LANTUS) 5 Unit(s) SubCutaneous at bedtime  insulin lispro (ADMELOG) corrective regimen sliding scale   SubCutaneous Before meals and at bedtime  losartan 50 milliGRAM(s) Oral daily  methocarbamol 500 milliGRAM(s) Oral every 8 hours  multivitamin 1 Tablet(s) Oral daily  polyethylene glycol 3350 17 Gram(s) Oral daily  senna 2 Tablet(s) Oral at bedtime    MEDICATIONS  (PRN):  acetaminophen     Tablet .. 650 milliGRAM(s) Oral every 6 hours PRN Temp greater or equal to 38C (100.4F), Mild Pain (1 - 3)  traMADol 25 milliGRAM(s) Oral every 4 hours PRN Moderate Pain (4 - 6)  traMADol 50 milliGRAM(s) Oral every 4 hours PRN Severe Pain (7 - 10)    DIET: reg   SUBJECTIVE: patient evaluated this AM, comfortable appearing in bed laying on R side offloading incision. voiding independently. Vitals and labs stable. R JOVANNA output 30 cc in 24H - removed by plastic surgery.   - discussed with patient strict sacrectomy protocol frequent incision off loading, no medications per rectum. Sign above bed    Vital Signs Last 24 Hrs  T(C): 36.9 (03-05-25 @ 07:34), Max: 37.1 (03-04-25 @ 20:14)  T(F): 98.4 (03-05-25 @ 07:34), Max: 98.7 (03-04-25 @ 20:14)  HR: 68 (03-05-25 @ 07:34) (68 - 90)  BP: 101/66 (03-05-25 @ 07:34) (101/66 - 124/74)  BP(mean): --  RR: 18 (03-05-25 @ 07:34) (18 - 18)  SpO2: 98% (03-05-25 @ 07:34) (97% - 98%)    PHYSICAL EXAM:  Constitutional: No Acute Distress   Neurological: Awake/alert, oriented x3 (person, place and time), EOMI, PERRL 3mm briskly reactive, b/l upper extremities 5/5 strength, b/l LE symmetric 5/5 strength when isolating all muscle groups, no lower extremity sensory loss, endorsing some saddle numbness posteriorly   Incision: sacral incision closed w/ sutures, incision is clean, dry and intact throughout its entirety   Drains: 1 surgical JOVANNA drain (R sided) with serosanguineous output. Prev exit site of L JOVANNA covered w/ clean dry dressing   Pulmonary: Clear lungs  Cardiovascular: Regular rate and rhythm   Gastrointestinal: Soft, Non-tender, Non-distended, +bowel sounds x 4  Extremities: No calf tenderness bilaterally, no edema      LABS:                     9.5    7.67  )-----------( 200      ( 02 Mar 2025 06:28 )             28.2      03-02  135  |  93[L]  |  12  ----------------------------<  252[H]  4.0   |  30  |  0.41[L]  Ca    8.7      02 Mar 2025 06:21    I&O's Summary    03 Mar 2025 07:01  -  04 Mar 2025 07:00  --------------------------------------------------------  IN: 930 mL / OUT: 1090 mL / NET: -160 mL    IMAGING:   < from: CT Abdomen and Pelvis w/ IV Cont (03.02.25 @ 18:38) >  ACC: [id number] EXAM:  CT ABDOMEN AND PELVIS IC   ORDERED BY: MONSERRAT JONES   PROCEDURE DATE:  03/02/2025    INTERPRETATION:  CLINICAL INFORMATION: Status post sphincterectomy.   Constipation abdominal pain is stuck sensation and rectum. Evaluate for stool burden.  COMPARISON: CT abdomen pelvis 10/15/2024.    CONTRAST/COMPLICATIONS:  IV Contrast: Omnipaque 350  90 cc administered   10 cc discarded  Oral Contrast: NONE    PROCEDURE:  CT of the Abdomen and Pelvis was performed.  Sagittal and coronal reformats were performed.    FINDINGS:  LOWER CHEST: Within normal limits.    LIVER: Within normal limits.  BILE DUCTS: Mild intrahepatic biliary duct dilation, compatible with   history of cholecystectomy. No extrahepatic biliary duct dilation.  GALLBLADDER: Cholecystectomy.  SPLEEN: Within normal limits.  PANCREAS: Within normal limits.  ADRENALS: Within normal limits.  KIDNEYS/URETERS: Symmetric renal enhancement. No hydronephrosis. 0.7 cm   left nonobstructing renal calculus.    BLADDER: Terry catheter. Nondependent focus of air.  REPRODUCTIVE ORGANS: Hysterectomy.    BOWEL: No bowel obstruction. Appendix is normal. Mild colonic stool   burden. Circumferential rectal wall thickening with trace sacral edema.  PERITONEUM/RETROPERITONEUM: Within normal limits.  VESSELS: Minimal atherosclerotic changes.  LYMPH NODES: No lymphadenopathy.  ABDOMINAL WALL: Tiny fat and fluid containing umbilical hernia.  BONES: Status post sacrectomy and placement of bilateral sacroiliac   syndesmotic screws and interlocking zoë. Otherwise bones are within   normal limits.    IMPRESSION:  Mild colonic stool burden.  Circumferential rectal wall thickening with trace presacral edema,   compatible with proctitis.      --- End of Report ---  ROSALIA MADISON MD; Resident Radiologist  This document has been electronically signed.  TONI LUBIN MD; Attending Radiologist  This document has been electronically signed. Mar  3 2025  8:46AM  < end of copied text >    MEDICATIONS  (STANDING):  ascorbic acid 500 milliGRAM(s) Oral daily  atorvastatin 10 milliGRAM(s) Oral at bedtime  ceFAZolin   IVPB 2000 milliGRAM(s) IV Intermittent every 8 hours  enoxaparin Injectable 40 milliGRAM(s) SubCutaneous <User Schedule>  gabapentin 100 milliGRAM(s) Oral every 8 hours  insulin glargine Injectable (LANTUS) 5 Unit(s) SubCutaneous at bedtime  insulin lispro (ADMELOG) corrective regimen sliding scale   SubCutaneous Before meals and at bedtime  losartan 50 milliGRAM(s) Oral daily  methocarbamol 500 milliGRAM(s) Oral every 8 hours  multivitamin 1 Tablet(s) Oral daily  polyethylene glycol 3350 17 Gram(s) Oral daily  senna 2 Tablet(s) Oral at bedtime    MEDICATIONS  (PRN):  acetaminophen     Tablet .. 650 milliGRAM(s) Oral every 6 hours PRN Temp greater or equal to 38C (100.4F), Mild Pain (1 - 3)  traMADol 25 milliGRAM(s) Oral every 4 hours PRN Moderate Pain (4 - 6)  traMADol 50 milliGRAM(s) Oral every 4 hours PRN Severe Pain (7 - 10)    DIET: reg

## 2025-03-05 NOTE — PROGRESS NOTE ADULT - SUBJECTIVE AND OBJECTIVE BOX
Plastic Surgery Progress Note (pg [facility]: [id number], [facility]: [phone])    SUBJECTIVE  The patient was seen and examined. No acute events overnight. Pain controlled, afebrile w/ stable vitals.     OBJECTIVE  ___________________________________________________  VITAL SIGNS / I&O's   Vital Signs Last 24 Hrs  T(C): 36.9 (05 Mar 2025 07:34), Max: 37.1 (04 Mar 2025 20:14)  T(F): 98.4 (05 Mar 2025 07:34), Max: 98.7 (04 Mar 2025 20:14)  HR: 68 (05 Mar 2025 07:34) (68 - 90)  BP: 101/66 (05 Mar 2025 07:34) (101/66 - 124/74)  BP(mean): --  RR: 18 (05 Mar 2025 07:34) (18 - 18)  SpO2: 98% (05 Mar 2025 07:34) (97% - 98%)    Parameters below as of 05 Mar 2025 07:34  Patient On (Oxygen Delivery Method): room air          04 Mar 2025 07:01  -  05 Mar 2025 07:00  --------------------------------------------------------  IN:    Oral Fluid: 535 mL  Total IN: 535 mL    OUT:    Drain (mL): 31 mL    Voided (mL): 600 mL  Total OUT: 631 mL    Total NET: -96 mL        ___________________________________________________  PHYSICAL EXAM    -- CONSTITUTIONAL: NAD, lying in bed  -- NEURO: Awake, alert  -- HEENT: NC/AT, mucous membranes moist  -- NECK: Soft, no asymmetry  -- PULM: Non-labored respirations, equal chest rise bilaterally  -- BACK: Soft, flat. Incision c/d/i, dressing replaced. JPx1 SS.  -- EXTREMITIES: Warm and well perfused  -- PSYCH: Affect normal, A&Ox3    ___________________________________________________  LABS            CAPILLARY BLOOD GLUCOSE      POCT Blood Glucose.: 136 mg/dL (05 Mar 2025 08:22)  POCT Blood Glucose.: 183 mg/dL (04 Mar 2025 21:14)  POCT Blood Glucose.: 186 mg/dL (04 Mar 2025 17:08)  POCT Blood Glucose.: 154 mg/dL (04 Mar 2025 12:01)          ___________________________________________________  MICRO  Recent Cultures:    ___________________________________________________  MEDICATIONS  (STANDING):  ascorbic acid 500 milliGRAM(s) Oral daily  atorvastatin 10 milliGRAM(s) Oral at bedtime  ceFAZolin   IVPB 2000 milliGRAM(s) IV Intermittent every 8 hours  enoxaparin Injectable 40 milliGRAM(s) SubCutaneous <User Schedule>  gabapentin 100 milliGRAM(s) Oral every 8 hours  insulin glargine Injectable (LANTUS) 5 Unit(s) SubCutaneous at bedtime  insulin lispro (ADMELOG) corrective regimen sliding scale   SubCutaneous Before meals and at bedtime  losartan 50 milliGRAM(s) Oral daily  methocarbamol 500 milliGRAM(s) Oral every 8 hours  multivitamin 1 Tablet(s) Oral daily  polyethylene glycol 3350 17 Gram(s) Oral daily  senna 2 Tablet(s) Oral at bedtime    MEDICATIONS  (PRN):  acetaminophen     Tablet .. 650 milliGRAM(s) Oral every 6 hours PRN Temp greater or equal to 38C (100.4F), Mild Pain (1 - 3)  traMADol 25 milliGRAM(s) Oral every 4 hours PRN Moderate Pain (4 - 6)  traMADol 50 milliGRAM(s) Oral every 4 hours PRN Severe Pain (7 - 10)

## 2025-03-05 NOTE — PROGRESS NOTE ADULT - PROVIDER SPECIALTY LIST ADULT
Anesthesia
Neurosurgery
Plastic Surgery
SUBJECTIVE:    Bahman Marlow  is an 22 year old  male  who presents for evaluation and treatment of sore throat. Symptoms include sore throat, fatigue, fever and chills . Onset of symptoms was 2 days ago, and the onset and/or course has been persistent and unchanged.  Patient denies nausea, vomiting, diarrhea, loss of appetite and other URI symptoms. Strep exposure:  no known exposure.    He  has no history of significant recurrent respiratory illnesses.  denies any chest pain, sob, palpitations or any other cardiovascular symptoms;  denies any respiratory distress or symptoms;  denies any gastrointestinal symptoms;  denies any genitourinary symptoms;  denies any headaches, vision changer or any other   neurologic symptoms;      Medication and allergies reviewed.    Past Medical History:   Diagnosis Date   • Varicella 2002   • Viral warts, unspecified [id number]    L nostril verruca vulgaris     Social History     Social History   • Marital status: Single     Spouse name: N/A   • Number of children: 0   • Years of education: N/A     Occupational History   • student      Social History Main Topics   • Smoking status: Never Smoker   • Smokeless tobacco: Never Used      Comment: No smokers in the household   • Alcohol use No   • Drug use: No   • Sexual activity: No     Other Topics Concern   • Not on file     Social History Narrative   • No narrative on file           OBJECTIVE:    Visit Vitals  BP 90/60 (BP Location: [facility], Patient Position: Sitting, Cuff Size: Regular)   Pulse 98   Temp 101.4 °F (38.6 °C) (Tympanic)   Wt 94.6 kg   SpO2 97%   BMI 26.42 kg/m²     General appearance: Healthy, alert, in no distress  Eyes: normal without discharge; eomi, perrla;  Ears: R TM - normal, L TM - normal  Nose: normal  Oropharynx: marked erythema, throat culture obtained and tonsillar hypertrophy, 1+; no exudates   Neck: normal, supple and no adenopathy. Neg meningeal signs   Lungs: clear to auscultation  Heart: regular rate and 
Anesthesia
NSICU
Neurosurgery
Neurosurgery
Plastic Surgery
Plastic Surgery
rhythm and no murmurs, clicks, or gallops    Strep screen, result: not performed - pt refused     ASSESSMENT:    Acute pharyngitis - rule out Strep      PLAN:  Treat like strep  Pt refused rs swab and any lab tests; he does not want any blood nor lab tests today   > Lukewarm saltwater gargling and use otc lozenges for symptomatic relief.  > Symptomatic treatment with fluids, rest and acetaminophen prn          fever.  >If symptoms are not improved in 3 days or if they worsen should recheck immediately either with pmd/wic.  > See medication below.    
Neurosurgery
Neurosurgery
Plastic Surgery
NSICU
Neurosurgery
Neurosurgery

## 2025-03-05 NOTE — DISCHARGE NOTE PROVIDER - PROVIDER TOKENS
PROVIDER:[TOKEN:[[id number]]],PROVIDER:[TOKEN:[[id number]]] PROVIDER:[TOKEN:[[id number]],FOLLOWUP:[1 week]],PROVIDER:[TOKEN:[[id number]],FOLLOWUP:[2 weeks]]

## 2025-03-05 NOTE — PROGRESS NOTE ADULT - ASSESSMENT
ASSESSMENT: 49 year old female c/o lower back/ tailbone pain since September, I have had inconclusive biopsies, presents today to [facility] for scheduled distal sacrectomy for sacrococcygeal disorders on 2/26/2025. Her medical history significant for MVP (unknown last echo), hypertension, T2DM on Ozempic, previous h/o anemia. She denies any pain at present, no change in her bowel habits. ***Her last dose of  Ozempic was 2/3/2025. (10 Feb 2025 15:21)  s/p Posterior S2 Sacrectomy, Sacrifice of b/l S4 and S5 nerve root, Sacro-illiac Instrumentation 2/26, w/ plastics closure (Dr. Nicole)     NEURO: Neurochecks q4h   plastics closure, 1 surgical JOVANNA drain 30 cc output in 24H; Prevena vac discontinued 3/3  Pain control with PRN medications: Tylenol, tramadol, robaxin  continue gabapentin 100mg q8h   Strict sacrectomy protocol - no meds per rectum. Frequent offloading   PT/OT rec outpatient PT upon discharge     PULM: on room air, sat >92%   Incentive spirometer encouraged, mobilize as tolerated    CV: -160 mmHg   h/o HTN continue home med losartan   continue statin     RENAL: IVL   patient is voiding independently w/o retention   replete electrolytes prn     GI: regular diet   continue bowel regimen (no meds WA) - constipation resolved s/p mag citrate. LBM 3/4  probiotic ordered while on iv abx     ENDO: Goal euglycemia (-180)  continue lantus 5U QHS while admitted     HEME/ONC: h/h stable   VTE prophylaxis: [x] SCDs , LED neg for DVT 2/26    ID: received rubin-op antibiotics, continued on ancef while drains are in per plastics.     Will discuss with Dr. Lara   contact via TEAMS preferred  ASSESSMENT: 49 year old female c/o lower back/ tailbone pain since September, I have had inconclusive biopsies, presents today to [facility] for scheduled distal sacrectomy for sacrococcygeal disorders on 2/26/2025. Her medical history significant for MVP (unknown last echo), hypertension, T2DM on Ozempic, previous h/o anemia. She denies any pain at present, no change in her bowel habits. ***Her last dose of  Ozempic was 2/3/2025. (10 Feb 2025 15:21)  s/p Posterior S2 Sacrectomy, Sacrifice of b/l S4 and S5 nerve root, Sacro-illiac Instrumentation 2/26, w/ plastics closure (Dr. Nicole)     NEURO: Neurochecks q4h   plastics closure, 1 surgical JOVANNA drain 30 cc output in 24H- removed by plastics Prevena vac discontinued 3/3  Pain control with PRN medications: Tylenol, tramadol, robaxin  continue gabapentin 100mg q8h   Strict sacrectomy protocol - no meds per rectum. Frequent offloading   PT/OT rec outpatient PT upon discharge     PULM: on room air, sat >92%   Incentive spirometer encouraged, mobilize as tolerated    CV: -160 mmHg   h/o HTN continue home med losartan   continue statin     RENAL: IVL   patient is voiding independently w/o retention   replete electrolytes prn     GI: regular diet   continue bowel regimen (no meds NM) - constipation resolved s/p mag citrate. LBM 3/4  probiotic ordered while on iv abx     ENDO: Goal euglycemia (-180)  continue lantus 5U QHS while admitted     HEME/ONC: h/h stable   VTE prophylaxis: [x] SCDs , LED neg for DVT 2/26    ID: received rubin-op antibiotics, continued on ancef while drains are in per plastics.     Will discuss with Dr. Lara   contact via TEAMS preferred

## 2025-03-05 NOTE — PROGRESS NOTE ADULT - ASSESSMENT
JANINA SEBASTIAN is a 49yFemale s/p distal sacrectomy with closure by PRS 02/26.     - Monitor drain output  - Dressing changed today  - Pain control  - Advance diet as tolerated, ensure pt taking adequate protein  - Appreciate rest of care per primary team    Plastic Surgery   [facility]: [id number]  [facility]: [phone]

## 2025-03-05 NOTE — DISCHARGE NOTE PROVIDER - CARE PROVIDER_API CALL
Hudson Lara  Neurosurgery  [address], [address]  [place] [address]  Phone: [phone]  Fax: [phone]  Follow Up Time:     Geoff Nicole  Plastic Surgery  [address], [address]  [place] [address]  Phone: [phone]  Fax: [phone]  Follow Up Time:    Hudson Lara  Neurosurgery  [address], [address]  [place] [address]  Phone: [phone]  Fax: [phone]  Follow Up Time: 1 week    Geoff Nicole  Plastic Surgery  [address], [address]  [place] [address]  Phone: [phone]  Fax: [phone]  Follow Up Time: 2 weeks

## 2025-03-05 NOTE — DISCHARGE NOTE NURSING/CASE MANAGEMENT/SOCIAL WORK - NSDCPEFALRISK_GEN_ALL_CORE
For information on Fall & Injury Prevention, visit: [email or web address] OR  [email or web address] OR  https://www.cdc.gov/steadi/patient.html

## 2025-03-05 NOTE — DISCHARGE NOTE PROVIDER - HOSPITAL COURSE
49 year old female c/o lower back/ tailbone pain since September, I have had inconclusive biopsies, presents today to [facility] for scheduled distal sacrectomy for sacrococcygeal disorders on 2/26/2025. Her medical history significant for MVP (unknown last echo), hypertension, T2DM on Ozempic, previous h/o anemia. She denies any pain at present, no change in her bowel habits.  ***Per PST last dose of Ozempic was 2/3/2025 . Presents for scheduled surgery    Patient underwent Sacrifice of b/l S4 and S5 nerve root; Sacro-illiac Instrumentation 2/26/2025; Plastics closure (Dr. Nicole) 2 surgical JOVANNA drains placed and Prevena vac. Patient monitored in NSCU postop; CT pelvis performed routinely postop. PCA discontinued on 2/27. Transferred to [address] [place] [address] 2/28 drain outputs serially monitored. Plastic surgery daily incisional checks; Prevena vac discontinued on 3/3. Hospital course significant w constipation, resolved w. stool softeners and magnesium citrate. Sacrectomy protocol and incisional off loading, instructions for no medications per rectum , no digital disimpaction. Terry removed on 3/3 voiding independently, without retention and having regular bowel movements. LEFT JOVANNA drain removed on 3/4, RIGHT JOVANNA removed on 3/5 by plastic surgery.   Pain adequately controlled on current regimen.   Patient was evaluated by physical and occupational therapy who recommend outpatient PT.     Lowe extremity dopplers performed negative for DVT   On the day of discharge the patient is medically and neurologically stable for discharge to Home     [facility] reference # [id number] 49 year old female c/o lower back/ tailbone pain since September, I have had inconclusive biopsies, presents today to [facility] for scheduled distal sacrectomy for sacrococcygeal disorders on 2/26/2025. Her medical history significant for MVP (unknown last echo), hypertension, T2DM on Ozempic, previous h/o anemia. She denies any pain at present, no change in her bowel habits.  ***Per PST last dose of Ozempic was 2/3/2025 . Presents for scheduled surgery    Patient underwent Posterior S2 Sacrectomy; Sacrifice of b/l S4 and S5 nerve root; Sacrifice of b/l S4 and S5 nerve root; Sacro-illiac Instrumentation 2/26/2025; Plastics closure (Dr. Nicole) 2 surgical JOVANNA drains placed and Prevena vac. Patient monitored in NSCU postop; CT pelvis performed routinely postop. PCA discontinued on 2/27. Transferred to [address] [place] [address] 2/28 drain outputs serially monitored. Plastic surgery daily incisional checks; Prevena vac discontinued on 3/3. Hospital course significant w constipation, resolved w. stool softeners and magnesium citrate. Sacrectomy protocol and incisional off loading, instructions for no medications per rectum , no digital disimpaction. Terry removed on 3/3 voiding independently, without retention and having regular bowel movements. LEFT JOVANNA drain removed on 3/4, RIGHT JOVANNA removed on 3/5 by plastic surgery.   Pain adequately controlled on current regimen.   Patient was evaluated by physical and occupational therapy who recommend outpatient PT.     Lowe extremity dopplers performed negative for DVT   On the day of discharge the patient is medically and neurologically stable for discharge to Home     [facility] reference # [id number]

## 2025-03-05 NOTE — DISCHARGE NOTE NURSING/CASE MANAGEMENT/SOCIAL WORK - PATIENT PORTAL LINK FT
You can access the FollowMyHealth Patient Portal offered by [facility] by registering at the following website: [email or web address]. By joining [facility]’s FollowMyHealth portal, you will also be able to view your health information using other applications (apps) compatible with our system.

## 2025-03-05 NOTE — PROGRESS NOTE ADULT - REASON FOR ADMISSION
Patient was admitted for sacrectomy- h/o back pain and sacral lesion
sacrectomy
Patient was admitted for sacrectomy- h/o back pain and sacral lesion.
Sacral tumor
sacrectomy
sacrectomy

## 2025-03-06 PROBLEM — Z86.79 PERSONAL HISTORY OF OTHER DISEASES OF THE CIRCULATORY SYSTEM: Chronic | Status: ACTIVE | Noted: 2025-02-10

## 2025-03-06 PROBLEM — I10 ESSENTIAL (PRIMARY) HYPERTENSION: Chronic | Status: ACTIVE | Noted: 2025-02-10

## 2025-03-06 PROBLEM — M53.3 SACROCOCCYGEAL DISORDERS, NOT ELSEWHERE CLASSIFIED: Chronic | Status: ACTIVE | Noted: 2025-02-10

## 2025-03-10 PROBLEM — Z86.39 PERSONAL HISTORY OF OTHER ENDOCRINE, NUTRITIONAL AND METABOLIC DISEASE: Chronic | Status: ACTIVE | Noted: 2025-02-10

## 2025-03-10 RX ORDER — ORAL SEMAGLUTIDE 14 MG/1
1 TABLET ORAL
Refills: 0 | DISCHARGE

## 2025-03-10 NOTE — CHART NOTE - NSCHARTNOTEFT_GEN_A_CORE
CAPRINI SCORE [CLOT] Score on Admission for     AGE RELATED RISK FACTORS                                                       MOBILITY RELATED FACTORS  [ x] Age 41-60 years                                            (1 Point)                  [ ] Bed rest                                                        (1 Point)  [ ] Age: 61-74 years                                           (2 Points)                 [ ] Plaster cast                                                   (2 Points)  [ ] Age= 75 years                                              (3 Points)                 [ ] Bed bound for more than 72 hours                 (2 Points)    DISEASE RELATED RISK FACTORS                                               GENDER SPECIFIC FACTORS  [ ] Edema in the lower extremities                       (1 Point)                  [ ] Pregnancy                                                     (1 Point)  [ ] Varicose veins                                               (1 Point)                  [ ] Post-partum < 6 weeks                                   (1 Point)             [x ] BMI > 25 Kg/m2                                            (1 Point)                  [ ] Hormonal therapy  or oral contraception          (1 Point)                 [ ] Sepsis (in the previous month)                        (1 Point)                  [ ] History of pregnancy complications                 (1 point)  [ ] Pneumonia or serious lung disease                                               [ ] Unexplained or recurrent                     (1 Point)           (in the previous month)                               (1 Point)  [ ] Abnormal pulmonary function test                     (1 Point)                 SURGERY RELATED RISK FACTORS (include planned surgeries)  [ ] Acute myocardial infarction                              (1 Point)                 [ ]  Section                                             (1 Point)  [ ] Congestive heart failure (in the previous month)  (1 Point)         [ ] Minor surgery                                                  (1 Point)   [ ] Inflammatory bowel disease                             (1 Point)                 [ ] Arthroscopic surgery                                        (2 Points)  [ ] Central venous access                                      (2 Points)                [ x] General surgery lasting more than 45 minutes   (2 Points)       [ ] Stroke (in the previous month)                          (5 Points)               [ ] Elective arthroplasty                                         (5 Points)            [ ] current or past malignancy                              (2 Points)                                                                                                       HEMATOLOGY RELATED FACTORS                                                 TRAUMA RELATED RISK FACTORS  [ ] Prior episodes of VTE                                     (3 Points)                [ ] Fracture of the hip, pelvis, or leg                       (5 Points)  [ ] Positive family history for VTE                         (3 Points)                 [ ] Acute spinal cord injury (in the previous month)  (5 Points)  [ ] Prothrombin 49190 A                                     (3 Points)                 [ ] Paralysis  (less than 1 month)                             (5 Points)  [ ] Factor V Leiden                                             (3 Points)                  [ ] Multiple Trauma within 1 month                        (5 Points)  [ ] Lupus anticoagulants                                     (3 Points)                                                           [ ] Anticardiolipin antibodies                               (3 Points)                                                       [ ] High homocysteine in the blood                      (3 Points)                                             [ ] Other congenital or acquired thrombophilia      (3 Points)                                                [ ] Heparin induced thrombocytopenia                  (3 Points)                                          Total Score [     4     ]    Risk:  Very low 0   Low 1 to 2   Moderate 3 to 4   High =5       VTE Prophylasix Recommednations:  [x ] mechanical pneumatic compression devices                                      [ ] contraindicated: _____________________  [ ] chemo prophylasix                                                                                   [ x] contraindicated _____________________    **** HIGH LIKELIHOOD DVT PRESENT ON ADMISSION  [x ] (please order LE dopplers within 24 hours of admission)
Post-Discharge Medication Review: Completed	  	  [facility]	  	  Patient contacted to offer medication counseling post-discharge. Medication reconciliation completed. Per patient, medications include:	  	  1.	acetaminophen 325 mg oral tablet 2 tab(s) orally every 6 hours As needed Temp greater or equal to 38C (100.4F), Mild Pain (1 - 3)  2.	ascorbic acid 500 mg oral tablet 1 tab(s) orally once a day  3.	atorvastatin 10 mg oral tablet 1 tab(s) orally once a day  4.	gabapentin 100 mg oral capsule 1 cap(s) orally every 8 hours  5.	losartan 50 mg oral tablet 1 tab(s) orally once a day  6.	methocarbamol 500 mg oral tablet 1 tab(s) orally every 8 hours as needed for muscle spasm  7.	Multiple Vitamins oral tablet 1 tab(s) orally once a day  8.	polyethylene glycol 3350 oral powder for reconstitution 17 gram(s) orally once a day  9.	senna leaf extract oral tablet 2 tab(s) orally once a day (at bedtime)  10.	traMADol 50 mg oral tablet 1 tab(s) orally every 6 hours as needed for Severe Pain (7 - 10) MDD: 4 tablets  Medications added to OMR (updated per discussion with patient):	  11.	Ozempic 2mg/1.5 mL (1 mg dose) subcutaneous solution 1 milligram(s) subcutaneously once a week on Sundays     Medications removed from OMR (updated per discussion with patient):	  1.	bisacodyl 5 mg oral delayed release tablet 1 tab(s) orally every 12 hours As needed Constipation  2.	Narcan 4 mg/0.1 mL nasal spray 1 spray(s) intranasally once as needed for OPIOID OVERDOSE RESCUE  3.	Ozempic 2 mg/1.5 mL (1 mg dose) subcutaneous solution subcutaneous once a week every Monday   	  Medication name, indication, administration, side effect, and monitoring reviewed for new medications during post discharge counseling visit with patient. Patient demonstrated understanding. Counseling offered for all medications.	  	  Patient is currently taking gabapentin, methocarbamol (as needed), and tramadol (as needed) for pain. She reports that she still sometimes experiences a sharp pain on her left side when getting in and out of bed, which resolves on its own. Advised the patient to reach out to the neurosurgery office if the pain persists or worsens. Counseled the patient to try not to take multiple pain medications at the same time to avoid the risk of additive sedation, and that per discharge papers patient can also take acetaminophen as needed for mild pain. Patient stated that she plans to purchase acetaminophen, ascorbic acid, and senna over the counter. Patient will also call the neurosurgery office to confirm follow-up appointment is scheduled.  	  Carlie Llanos PharmD	  Clinical Pharmacy Specialist, Pharmacy Telehealth Team	  Can be reached via MS Teams or [phone]

## 2025-03-12 ENCOUNTER — APPOINTMENT (OUTPATIENT)
Dept: ENDOCRINOLOGY | Facility: CLINIC | Age: 50
End: 2025-03-12

## 2025-03-12 DIAGNOSIS — E11.9 TYPE 2 DIABETES MELLITUS W/OUT COMPLICATIONS: ICD-10-CM

## 2025-03-12 PROCEDURE — 99212 OFFICE O/P EST SF 10 MIN: CPT | Mod: 95

## 2025-03-12 RX ORDER — LANCETS 28 GAUGE
EACH MISCELLANEOUS
Qty: 3 | Refills: 3 | Status: ACTIVE | COMMUNITY
Start: 2025-03-12 | End: 1900-01-01

## 2025-03-12 RX ORDER — BLOOD SUGAR DIAGNOSTIC
STRIP MISCELLANEOUS
Qty: 3 | Refills: 3 | Status: ACTIVE | COMMUNITY
Start: 2025-03-12 | End: 1900-01-01

## 2025-03-12 RX ORDER — BLOOD-GLUCOSE METER
W/DEVICE KIT MISCELLANEOUS
Qty: 1 | Refills: 0 | Status: ACTIVE | COMMUNITY
Start: 2025-03-12 | End: 1900-01-01

## 2025-03-12 RX ORDER — TRAMADOL HYDROCHLORIDE 50 MG/1
50 TABLET, COATED ORAL
Qty: 60 | Refills: 0 | Status: ACTIVE | COMMUNITY
Start: 2025-03-12 | End: 1900-01-01

## 2025-03-14 ENCOUNTER — NON-APPOINTMENT (OUTPATIENT)
Age: 50
End: 2025-03-14

## 2025-03-19 ENCOUNTER — APPOINTMENT (OUTPATIENT)
Dept: PLASTIC SURGERY | Facility: CLINIC | Age: 50
End: 2025-03-19

## 2025-03-19 VITALS
SYSTOLIC BLOOD PRESSURE: 132 MMHG | OXYGEN SATURATION: 99 % | TEMPERATURE: 97.9 F | WEIGHT: 155 LBS | HEART RATE: 127 BPM | BODY MASS INDEX: 26.46 KG/M2 | DIASTOLIC BLOOD PRESSURE: 75 MMHG | HEIGHT: 64 IN

## 2025-03-19 PROCEDURE — 99024 POSTOP FOLLOW-UP VISIT: CPT

## 2025-03-20 RX ORDER — GABAPENTIN 100 MG/1
100 CAPSULE ORAL 3 TIMES DAILY
Qty: 90 | Refills: 3 | Status: ACTIVE | COMMUNITY
Start: 2025-03-20 | End: 1900-01-01

## 2025-03-21 ENCOUNTER — APPOINTMENT (OUTPATIENT)
Dept: INTERNAL MEDICINE | Facility: CLINIC | Age: 50
End: 2025-03-21
Payer: COMMERCIAL

## 2025-03-21 VITALS
SYSTOLIC BLOOD PRESSURE: 117 MMHG | OXYGEN SATURATION: 99 % | BODY MASS INDEX: 26.46 KG/M2 | WEIGHT: 155 LBS | HEIGHT: 64 IN | DIASTOLIC BLOOD PRESSURE: 79 MMHG | TEMPERATURE: 97.8 F | HEART RATE: 109 BPM

## 2025-03-21 DIAGNOSIS — K64.9 UNSPECIFIED HEMORRHOIDS: ICD-10-CM

## 2025-03-21 LAB — SURGICAL PATHOLOGY STUDY: SIGNIFICANT CHANGE UP

## 2025-03-21 PROCEDURE — 99213 OFFICE O/P EST LOW 20 MIN: CPT

## 2025-03-21 RX ORDER — HYDROCORTISONE 10 MG/G
1 OINTMENT TOPICAL
Qty: 1 | Refills: 0 | Status: ACTIVE | COMMUNITY
Start: 2025-03-21 | End: 1900-01-01

## 2025-03-22 LAB
ALBUMIN MFR SERPL ELPH: 54.2 %
ALBUMIN SERPL-MCNC: 3.8 G/DL
ALBUMIN/GLOB SERPL: 1.2 RATIO
ALPHA1 GLOB MFR SERPL ELPH: 4.7 %
ALPHA1 GLOB SERPL ELPH-MCNC: 0.3 G/DL
ALPHA2 GLOB MFR SERPL ELPH: 12.2 %
ALPHA2 GLOB SERPL ELPH-MCNC: 0.9 G/DL
B-GLOBULIN MFR SERPL ELPH: 12 %
B-GLOBULIN SERPL ELPH-MCNC: 0.9 G/DL
DEPRECATED KAPPA LC FREE/LAMBDA SER: 0.65 RATIO
FERRITIN SERPL-MCNC: 20 NG/ML
GAMMA GLOB FLD ELPH-MCNC: 1.2 G/DL
GAMMA GLOB MFR SERPL ELPH: 16.9 %
HAPTOGLOB SERPL-MCNC: 174 MG/DL
IGA SER QL IEP: 220 MG/DL
IGG SER QL IEP: 1255 MG/DL
IGM SER QL IEP: 52 MG/DL
INTERPRETATION SERPL IEP-IMP: NORMAL
IRON SATN MFR SERPL: 18 %
IRON SERPL-MCNC: 54 UG/DL
KAPPA LC CSF-MCNC: 2.63 MG/DL
KAPPA LC SERPL-MCNC: 1.72 MG/DL
LDH SERPL-CCNC: 179 U/L
M PROTEIN SPEC IFE-MCNC: NORMAL
PROT SERPL-MCNC: 7.1 G/DL
PROT SERPL-MCNC: 7.1 G/DL
RBC # BLD: 3.93 M/UL
RETICS # AUTO: 2.1 %
RETICS AGGREG/RBC NFR: 81.7 K/UL
TIBC SERPL-MCNC: 307 UG/DL
TRANSFERRIN SERPL-MCNC: 248 MG/DL
UIBC SERPL-MCNC: 254 UG/DL

## 2025-03-23 LAB
ALBUPE: 8.1 %
ALPHA1UPE: 35.7 %
ALPHA2UPE: 18.6 %
BETAUPE: 26.8 %
GAMMAUPE: 10.8 %
IGA 24H UR QL IFE: NORMAL
KAPPA LC 24H UR QL: NORMAL
PROT PATTERN 24H UR ELPH-IMP: NORMAL
PROT UR-MCNC: 30 MG/DL
PROT UR-MCNC: 30 MG/DL

## 2025-03-26 ENCOUNTER — NON-APPOINTMENT (OUTPATIENT)
Age: 50
End: 2025-03-26

## 2025-03-26 ENCOUNTER — APPOINTMENT (OUTPATIENT)
Dept: NEUROSURGERY | Facility: CLINIC | Age: 50
End: 2025-03-26
Payer: COMMERCIAL

## 2025-03-26 VITALS
HEIGHT: 64 IN | WEIGHT: 155 LBS | HEART RATE: 96 BPM | SYSTOLIC BLOOD PRESSURE: 124 MMHG | DIASTOLIC BLOOD PRESSURE: 73 MMHG | RESPIRATION RATE: 12 BRPM | BODY MASS INDEX: 26.46 KG/M2 | OXYGEN SATURATION: 97 %

## 2025-03-26 VITALS — TEMPERATURE: 97.1 F

## 2025-03-26 DIAGNOSIS — C41.2 MALIGNANT NEOPLASM OF VERTEBRAL COLUMN: ICD-10-CM

## 2025-03-26 PROCEDURE — 99024 POSTOP FOLLOW-UP VISIT: CPT

## 2025-03-26 RX ORDER — METHOCARBAMOL 500 MG/1
500 TABLET, FILM COATED ORAL 3 TIMES DAILY
Qty: 90 | Refills: 3 | Status: ACTIVE | COMMUNITY
Start: 2025-03-26 | End: 1900-01-01

## 2025-03-28 ENCOUNTER — APPOINTMENT (OUTPATIENT)
Dept: PLASTIC SURGERY | Facility: CLINIC | Age: 50
End: 2025-03-28
Payer: COMMERCIAL

## 2025-03-28 VITALS
HEIGHT: 64 IN | TEMPERATURE: 98.1 F | BODY MASS INDEX: 26.46 KG/M2 | SYSTOLIC BLOOD PRESSURE: 133 MMHG | OXYGEN SATURATION: 99 % | DIASTOLIC BLOOD PRESSURE: 83 MMHG | WEIGHT: 155 LBS | HEART RATE: 99 BPM

## 2025-03-28 PROCEDURE — 99024 POSTOP FOLLOW-UP VISIT: CPT

## 2025-04-01 ENCOUNTER — RESULT REVIEW (OUTPATIENT)
Age: 50
End: 2025-04-01

## 2025-04-03 ENCOUNTER — OUTPATIENT (OUTPATIENT)
Dept: OUTPATIENT SERVICES | Facility: HOSPITAL | Age: 50
LOS: 1 days | End: 2025-04-03
Payer: COMMERCIAL

## 2025-04-03 ENCOUNTER — APPOINTMENT (OUTPATIENT)
Dept: MRI IMAGING | Facility: CLINIC | Age: 50
End: 2025-04-03
Payer: COMMERCIAL

## 2025-04-03 DIAGNOSIS — Z90.711 ACQUIRED ABSENCE OF UTERUS WITH REMAINING CERVICAL STUMP: Chronic | ICD-10-CM

## 2025-04-03 DIAGNOSIS — M53.3 SACROCOCCYGEAL DISORDERS, NOT ELSEWHERE CLASSIFIED: ICD-10-CM

## 2025-04-03 DIAGNOSIS — Z98.890 OTHER SPECIFIED POSTPROCEDURAL STATES: Chronic | ICD-10-CM

## 2025-04-03 DIAGNOSIS — Z90.49 ACQUIRED ABSENCE OF OTHER SPECIFIED PARTS OF DIGESTIVE TRACT: Chronic | ICD-10-CM

## 2025-04-03 PROCEDURE — A9585: CPT

## 2025-04-03 PROCEDURE — 72197 MRI PELVIS W/O & W/DYE: CPT

## 2025-04-03 PROCEDURE — 72197 MRI PELVIS W/O & W/DYE: CPT | Mod: 26

## 2025-05-01 ENCOUNTER — APPOINTMENT (OUTPATIENT)
Dept: CT IMAGING | Facility: CLINIC | Age: 50
End: 2025-05-01

## 2025-05-02 ENCOUNTER — APPOINTMENT (OUTPATIENT)
Dept: CT IMAGING | Facility: CLINIC | Age: 50
End: 2025-05-02
Payer: COMMERCIAL

## 2025-05-02 ENCOUNTER — APPOINTMENT (OUTPATIENT)
Dept: MRI IMAGING | Facility: CLINIC | Age: 50
End: 2025-05-02
Payer: COMMERCIAL

## 2025-05-02 ENCOUNTER — OUTPATIENT (OUTPATIENT)
Dept: OUTPATIENT SERVICES | Facility: HOSPITAL | Age: 50
LOS: 1 days | End: 2025-05-02
Payer: COMMERCIAL

## 2025-05-02 ENCOUNTER — RESULT REVIEW (OUTPATIENT)
Age: 50
End: 2025-05-02

## 2025-05-02 DIAGNOSIS — Z98.890 OTHER SPECIFIED POSTPROCEDURAL STATES: Chronic | ICD-10-CM

## 2025-05-02 DIAGNOSIS — Z90.49 ACQUIRED ABSENCE OF OTHER SPECIFIED PARTS OF DIGESTIVE TRACT: Chronic | ICD-10-CM

## 2025-05-02 DIAGNOSIS — Z90.711 ACQUIRED ABSENCE OF UTERUS WITH REMAINING CERVICAL STUMP: Chronic | ICD-10-CM

## 2025-05-02 DIAGNOSIS — C41.2 MALIGNANT NEOPLASM OF VERTEBRAL COLUMN: ICD-10-CM

## 2025-05-02 PROCEDURE — 72192 CT PELVIS W/O DYE: CPT | Mod: 26

## 2025-05-02 PROCEDURE — 72197 MRI PELVIS W/O & W/DYE: CPT | Mod: 26

## 2025-05-02 PROCEDURE — 72192 CT PELVIS W/O DYE: CPT

## 2025-05-02 PROCEDURE — A9585: CPT

## 2025-05-02 PROCEDURE — 76376 3D RENDER W/INTRP POSTPROCES: CPT | Mod: 26

## 2025-05-02 PROCEDURE — 76376 3D RENDER W/INTRP POSTPROCES: CPT

## 2025-05-02 PROCEDURE — 72197 MRI PELVIS W/O & W/DYE: CPT

## 2025-05-07 ENCOUNTER — NON-APPOINTMENT (OUTPATIENT)
Age: 50
End: 2025-05-07

## 2025-05-09 ENCOUNTER — APPOINTMENT (OUTPATIENT)
Dept: PLASTIC SURGERY | Facility: CLINIC | Age: 50
End: 2025-05-09

## 2025-05-09 VITALS
WEIGHT: 155 LBS | DIASTOLIC BLOOD PRESSURE: 74 MMHG | OXYGEN SATURATION: 99 % | BODY MASS INDEX: 26.46 KG/M2 | HEIGHT: 64 IN | SYSTOLIC BLOOD PRESSURE: 142 MMHG | HEART RATE: 92 BPM | TEMPERATURE: 97.6 F

## 2025-05-09 PROCEDURE — 99024 POSTOP FOLLOW-UP VISIT: CPT

## 2025-05-15 ENCOUNTER — APPOINTMENT (OUTPATIENT)
Dept: NEUROSURGERY | Facility: CLINIC | Age: 50
End: 2025-05-15
Payer: COMMERCIAL

## 2025-05-15 ENCOUNTER — NON-APPOINTMENT (OUTPATIENT)
Age: 50
End: 2025-05-15

## 2025-05-15 VITALS
OXYGEN SATURATION: 98 % | BODY MASS INDEX: 27.83 KG/M2 | DIASTOLIC BLOOD PRESSURE: 82 MMHG | HEIGHT: 64 IN | SYSTOLIC BLOOD PRESSURE: 144 MMHG | HEART RATE: 101 BPM | WEIGHT: 163 LBS

## 2025-05-15 DIAGNOSIS — Z78.9 OTHER SPECIFIED HEALTH STATUS: ICD-10-CM

## 2025-05-15 DIAGNOSIS — C41.2 MALIGNANT NEOPLASM OF VERTEBRAL COLUMN: ICD-10-CM

## 2025-05-15 PROCEDURE — 99024 POSTOP FOLLOW-UP VISIT: CPT

## 2025-06-24 ENCOUNTER — APPOINTMENT (OUTPATIENT)
Dept: ENDOCRINOLOGY | Facility: CLINIC | Age: 50
End: 2025-06-24

## 2025-06-24 VITALS
SYSTOLIC BLOOD PRESSURE: 145 MMHG | OXYGEN SATURATION: 98 % | TEMPERATURE: 97.7 F | BODY MASS INDEX: 27.83 KG/M2 | HEIGHT: 64 IN | WEIGHT: 163 LBS | HEART RATE: 87 BPM | RESPIRATION RATE: 15 BRPM | DIASTOLIC BLOOD PRESSURE: 82 MMHG

## 2025-06-24 PROCEDURE — 99213 OFFICE O/P EST LOW 20 MIN: CPT

## 2025-06-24 PROCEDURE — 36415 COLL VENOUS BLD VENIPUNCTURE: CPT

## 2025-06-26 LAB
25(OH)D3 SERPL-MCNC: 16 NG/ML
ALBUMIN SERPL ELPH-MCNC: 4.5 G/DL
ALP BLD-CCNC: 103 U/L
ALT SERPL-CCNC: 30 U/L
ANION GAP SERPL CALC-SCNC: 15 MMOL/L
AST SERPL-CCNC: 30 U/L
BASOPHILS # BLD AUTO: 0.04 K/UL
BASOPHILS NFR BLD AUTO: 0.7 %
BILIRUB SERPL-MCNC: 0.4 MG/DL
BUN SERPL-MCNC: 12 MG/DL
CALCIUM SERPL-MCNC: 10.3 MG/DL
CHLORIDE SERPL-SCNC: 101 MMOL/L
CHOLEST SERPL-MCNC: 182 MG/DL
CO2 SERPL-SCNC: 24 MMOL/L
CREAT SERPL-MCNC: 0.47 MG/DL
CREAT SPEC-SCNC: 169 MG/DL
EGFRCR SERPLBLD CKD-EPI 2021: 117 ML/MIN/1.73M2
EOSINOPHIL # BLD AUTO: 0.13 K/UL
EOSINOPHIL NFR BLD AUTO: 2.3 %
ESTIMATED AVERAGE GLUCOSE: 160 MG/DL
GLUCOSE SERPL-MCNC: 131 MG/DL
HBA1C MFR BLD HPLC: 7.2 %
HCT VFR BLD CALC: 39.7 %
HDLC SERPL-MCNC: 66 MG/DL
HGB BLD-MCNC: 12.4 G/DL
IMM GRANULOCYTES NFR BLD AUTO: 0.2 %
LDLC SERPL-MCNC: 100 MG/DL
LYMPHOCYTES # BLD AUTO: 1.73 K/UL
LYMPHOCYTES NFR BLD AUTO: 31.2 %
MAN DIFF?: NORMAL
MCHC RBC-ENTMCNC: 28.5 PG
MCHC RBC-ENTMCNC: 31.2 G/DL
MCV RBC AUTO: 91.3 FL
MICROALBUMIN 24H UR DL<=1MG/L-MCNC: <1.2 MG/DL
MICROALBUMIN/CREAT 24H UR-RTO: NORMAL MG/G
MONOCYTES # BLD AUTO: 0.64 K/UL
MONOCYTES NFR BLD AUTO: 11.5 %
NEUTROPHILS # BLD AUTO: 3 K/UL
NEUTROPHILS NFR BLD AUTO: 54.1 %
NONHDLC SERPL-MCNC: 116 MG/DL
PLATELET # BLD AUTO: 287 K/UL
POTASSIUM SERPL-SCNC: 4.5 MMOL/L
PROT SERPL-MCNC: 7.8 G/DL
RBC # BLD: 4.35 M/UL
RBC # FLD: 15.4 %
SODIUM SERPL-SCNC: 140 MMOL/L
T4 FREE SERPL-MCNC: 1 NG/DL
TRIGL SERPL-MCNC: 86 MG/DL
TSH SERPL-ACNC: 1.14 UIU/ML
VIT B12 SERPL-MCNC: 161 PG/ML
WBC # FLD AUTO: 5.55 K/UL

## 2025-06-30 PROBLEM — R79.89 LOW VITAMIN D LEVEL: Status: ACTIVE | Noted: 2025-06-30

## 2025-06-30 RX ORDER — ERGOCALCIFEROL 1.25 MG/1
1.25 MG CAPSULE ORAL
Qty: 12 | Refills: 0 | Status: ACTIVE | COMMUNITY
Start: 2025-06-30 | End: 1900-01-01

## 2025-07-03 LAB
IF BLOCK AB SER QL: 89.4 AU/ML
PCA AB SER QL IF: ABNORMAL

## 2025-07-07 ENCOUNTER — MED ADMIN CHARGE (OUTPATIENT)
Age: 50
End: 2025-07-07

## 2025-07-07 ENCOUNTER — APPOINTMENT (OUTPATIENT)
Dept: INTERNAL MEDICINE | Facility: CLINIC | Age: 50
End: 2025-07-07
Payer: COMMERCIAL

## 2025-07-07 PROCEDURE — 96372 THER/PROPH/DIAG INJ SC/IM: CPT

## 2025-07-07 RX ORDER — GABAPENTIN 100 MG/1
100 CAPSULE ORAL 3 TIMES DAILY
Qty: 180 | Refills: 3 | Status: ACTIVE | COMMUNITY
Start: 2025-07-07 | End: 1900-01-01

## 2025-07-09 RX ORDER — CYANOCOBALAMIN 1000 UG/ML
1000 INJECTION, SOLUTION INTRAMUSCULAR; SUBCUTANEOUS
Qty: 0 | Refills: 0 | Status: COMPLETED | OUTPATIENT
Start: 2025-07-09

## 2025-07-22 ENCOUNTER — APPOINTMENT (OUTPATIENT)
Dept: CT IMAGING | Facility: CLINIC | Age: 50
End: 2025-07-22
Payer: COMMERCIAL

## 2025-07-22 ENCOUNTER — OUTPATIENT (OUTPATIENT)
Dept: OUTPATIENT SERVICES | Facility: HOSPITAL | Age: 50
LOS: 1 days | End: 2025-07-22
Payer: COMMERCIAL

## 2025-07-22 ENCOUNTER — APPOINTMENT (OUTPATIENT)
Dept: MRI IMAGING | Facility: CLINIC | Age: 50
End: 2025-07-22
Payer: COMMERCIAL

## 2025-07-22 ENCOUNTER — APPOINTMENT (OUTPATIENT)
Dept: INTERNAL MEDICINE | Facility: CLINIC | Age: 50
End: 2025-07-22
Payer: COMMERCIAL

## 2025-07-22 DIAGNOSIS — Z98.890 OTHER SPECIFIED POSTPROCEDURAL STATES: Chronic | ICD-10-CM

## 2025-07-22 DIAGNOSIS — Z90.711 ACQUIRED ABSENCE OF UTERUS WITH REMAINING CERVICAL STUMP: Chronic | ICD-10-CM

## 2025-07-22 DIAGNOSIS — Z90.49 ACQUIRED ABSENCE OF OTHER SPECIFIED PARTS OF DIGESTIVE TRACT: Chronic | ICD-10-CM

## 2025-07-22 DIAGNOSIS — C41.2 MALIGNANT NEOPLASM OF VERTEBRAL COLUMN: ICD-10-CM

## 2025-07-22 PROCEDURE — 71250 CT THORAX DX C-: CPT | Mod: 26

## 2025-07-22 PROCEDURE — 72197 MRI PELVIS W/O & W/DYE: CPT | Mod: 26

## 2025-07-22 PROCEDURE — A9585: CPT

## 2025-07-22 PROCEDURE — 96372 THER/PROPH/DIAG INJ SC/IM: CPT

## 2025-07-22 PROCEDURE — 71250 CT THORAX DX C-: CPT

## 2025-07-22 PROCEDURE — 72197 MRI PELVIS W/O & W/DYE: CPT

## 2025-07-22 RX ORDER — CYANOCOBALAMIN 1000 UG/ML
1000 INJECTION, SOLUTION INTRAMUSCULAR; SUBCUTANEOUS
Qty: 0 | Refills: 0 | Status: COMPLETED | OUTPATIENT
Start: 2025-07-22

## 2025-07-22 RX ADMIN — CYANOCOBALAMIN 0 MCG/ML: 1000 INJECTION, SOLUTION INTRAMUSCULAR at 00:00

## 2025-07-25 RX ORDER — METHOCARBAMOL 500 MG/1
500 TABLET, FILM COATED ORAL 3 TIMES DAILY
Qty: 42 | Refills: 0 | Status: ACTIVE | COMMUNITY
Start: 2025-07-25 | End: 1900-01-01

## 2025-07-28 ENCOUNTER — APPOINTMENT (OUTPATIENT)
Dept: INTERNAL MEDICINE | Facility: CLINIC | Age: 50
End: 2025-07-28
Payer: COMMERCIAL

## 2025-07-28 PROCEDURE — 96372 THER/PROPH/DIAG INJ SC/IM: CPT

## 2025-07-28 RX ORDER — CYANOCOBALAMIN 1000 UG/ML
1000 INJECTION, SOLUTION INTRAMUSCULAR; SUBCUTANEOUS
Qty: 0 | Refills: 0 | Status: COMPLETED | OUTPATIENT
Start: 2025-07-28

## 2025-07-28 RX ADMIN — CYANOCOBALAMIN 1 MCG/ML: 1000 INJECTION, SOLUTION INTRAMUSCULAR at 00:00

## 2025-08-04 ENCOUNTER — APPOINTMENT (OUTPATIENT)
Dept: INTERNAL MEDICINE | Facility: CLINIC | Age: 50
End: 2025-08-04
Payer: COMMERCIAL

## 2025-08-04 ENCOUNTER — MED ADMIN CHARGE (OUTPATIENT)
Age: 50
End: 2025-08-04

## 2025-08-04 PROCEDURE — 96372 THER/PROPH/DIAG INJ SC/IM: CPT

## 2025-08-04 RX ORDER — CYANOCOBALAMIN 1000 UG/ML
1000 INJECTION, SOLUTION INTRAMUSCULAR; SUBCUTANEOUS
Qty: 0 | Refills: 0 | Status: COMPLETED | OUTPATIENT
Start: 2025-08-04

## 2025-08-07 ENCOUNTER — APPOINTMENT (OUTPATIENT)
Dept: NEUROSURGERY | Facility: CLINIC | Age: 50
End: 2025-08-07
Payer: COMMERCIAL

## 2025-08-07 VITALS
HEIGHT: 64 IN | RESPIRATION RATE: 16 BRPM | HEART RATE: 79 BPM | DIASTOLIC BLOOD PRESSURE: 79 MMHG | SYSTOLIC BLOOD PRESSURE: 132 MMHG | OXYGEN SATURATION: 100 % | BODY MASS INDEX: 28 KG/M2 | WEIGHT: 164 LBS

## 2025-08-07 DIAGNOSIS — C41.2 MALIGNANT NEOPLASM OF VERTEBRAL COLUMN: ICD-10-CM

## 2025-08-07 DIAGNOSIS — Z78.9 OTHER SPECIFIED HEALTH STATUS: ICD-10-CM

## 2025-08-07 PROCEDURE — 99215 OFFICE O/P EST HI 40 MIN: CPT

## 2025-08-11 ENCOUNTER — APPOINTMENT (OUTPATIENT)
Dept: INTERNAL MEDICINE | Facility: CLINIC | Age: 50
End: 2025-08-11
Payer: COMMERCIAL

## 2025-08-11 ENCOUNTER — NON-APPOINTMENT (OUTPATIENT)
Age: 50
End: 2025-08-11

## 2025-08-11 VITALS
DIASTOLIC BLOOD PRESSURE: 84 MMHG | SYSTOLIC BLOOD PRESSURE: 127 MMHG | WEIGHT: 164 LBS | HEART RATE: 79 BPM | OXYGEN SATURATION: 98 % | TEMPERATURE: 97.3 F | BODY MASS INDEX: 28 KG/M2 | HEIGHT: 64 IN

## 2025-08-11 PROCEDURE — 99213 OFFICE O/P EST LOW 20 MIN: CPT

## 2025-08-11 PROCEDURE — G2211 COMPLEX E/M VISIT ADD ON: CPT | Mod: NC

## 2025-08-13 ENCOUNTER — APPOINTMENT (OUTPATIENT)
Dept: DERMATOLOGY | Facility: CLINIC | Age: 50
End: 2025-08-13
Payer: COMMERCIAL

## 2025-08-13 VITALS — WEIGHT: 164 LBS | BODY MASS INDEX: 28 KG/M2 | HEIGHT: 64 IN

## 2025-08-13 DIAGNOSIS — L81.4 OTHER MELANIN HYPERPIGMENTATION: ICD-10-CM

## 2025-08-13 DIAGNOSIS — D22.9 MELANOCYTIC NEVI, UNSPECIFIED: ICD-10-CM

## 2025-08-13 DIAGNOSIS — D48.5 NEOPLASM OF UNCERTAIN BEHAVIOR OF SKIN: ICD-10-CM

## 2025-08-13 PROCEDURE — 99203 OFFICE O/P NEW LOW 30 MIN: CPT | Mod: 25

## 2025-08-13 PROCEDURE — 11102 TANGNTL BX SKIN SINGLE LES: CPT

## 2025-08-13 PROCEDURE — 11103 TANGNTL BX SKIN EA SEP/ADDL: CPT

## 2025-08-20 LAB — DERMATOLOGY BIOPSY: NORMAL

## 2025-08-25 ENCOUNTER — NON-APPOINTMENT (OUTPATIENT)
Age: 50
End: 2025-08-25

## 2025-09-19 DIAGNOSIS — R91.1 SOLITARY PULMONARY NODULE: ICD-10-CM

## (undated) DEVICE — PACK LUMBAR LAMI

## (undated) DEVICE — SPECIMEN CONTAINER 100ML

## (undated) DEVICE — IRRIGATION TRAY W PISTON SYRINGE 60ML

## (undated) DEVICE — Device

## (undated) DEVICE — DRSG STERISTRIPS 0.5 X 4"

## (undated) DEVICE — VISITEC 4X4

## (undated) DEVICE — TUBING BIPOLAR IRRIGATOR AND CORD SET

## (undated) DEVICE — GLV 7.5 PROTEXIS (WHITE)

## (undated) DEVICE — DRSG TAPE HYPAFIX 4"

## (undated) DEVICE — VENODYNE/SCD SLEEVE CALF MEDIUM

## (undated) DEVICE — DRAPE INSTRUMENT POUCH 6.75" X 11"

## (undated) DEVICE — SUT SOFSILK 0 18" TIES

## (undated) DEVICE — GLV 7.5 PROTEXIS (BLUE)

## (undated) DEVICE — ELCTR AQUAMANTYS BIPOLAR SEALER 6.0

## (undated) DEVICE — BLADE SCALPEL SAFETYLOCK #10

## (undated) DEVICE — DRSG DERMABOND PRINEO 22CM

## (undated) DEVICE — ELCTR GROUNDING PAD ADULT COVIDIEN

## (undated) DEVICE — WARMING BLANKET LOWER ADULT

## (undated) DEVICE — SOL IRR POUR NS 0.9% 500ML

## (undated) DEVICE — NEURO SURGICAL STRIP 1/4 X 6"

## (undated) DEVICE — GLV 6.5 PROTEXIS (WHITE)

## (undated) DEVICE — GOWN TRIMAX LG

## (undated) DEVICE — PACK GENERAL MINOR

## (undated) DEVICE — NEURO SURGICAL STRIP 1/2 X 6"

## (undated) DEVICE — DRAPE TOWEL BLUE 17" X 24"

## (undated) DEVICE — DISSECTOR ENDO PEANUT 5MM

## (undated) DEVICE — MARKING PEN W RULER

## (undated) DEVICE — PACK THYROID HEAD NECK

## (undated) DEVICE — DRAIN JACKSON PRATT 3 SPRING RESERVOIR W 10FR PVC DRAIN

## (undated) DEVICE — DRAIN JACKSON PRATT 7MM FLAT FULL NO TROCAR

## (undated) DEVICE — DRSG XEROFORM 1 X 8"

## (undated) DEVICE — ELCTR BOVIE TIP BLADE INSULATED 2.75" EDGE

## (undated) DEVICE — GLV 8 PROTEXIS (WHITE)

## (undated) DEVICE — CATH IV SAFE INSYTE 14G X 1.75" (ORANGE)

## (undated) DEVICE — SUCTION CHICAGO TIP 12FR

## (undated) DEVICE — FRAZIER SUCTION TIP 8FR

## (undated) DEVICE — SPHERE MARKER (5 SPHERES)

## (undated) DEVICE — PREP CHLORAPREP HI-LITE ORANGE 26ML

## (undated) DEVICE — MISONIX BONESCALPEL DIAMOND SHAVER & TUBESET

## (undated) DEVICE — STAPLER SKIN VISI-STAT 35 WIDE

## (undated) DEVICE — DRSG PREVENA PLUS SYSTEM

## (undated) DEVICE — MIDAS REX MR8 MATCH HEAD FLUTED LG BORE 3MM X 14CM

## (undated) DEVICE — TUBING SUCTION 20FT

## (undated) DEVICE — MISONIX BONESCALPEL BLUNT BLADE & TUBESET 20MM

## (undated) DEVICE — BLADE SCALPEL SAFETYLOCK #15

## (undated) DEVICE — MEDICATION LABELS W MARKER

## (undated) DEVICE — LAP PAD 18 X 18"

## (undated) DEVICE — DRAPE BACK TABLE COVER HEAVY DUTY 60"

## (undated) DEVICE — PACK UNIVERSAL DRAPE

## (undated) DEVICE — WARMING BLANKET UPPER ADULT

## (undated) DEVICE — GLV 8.5 PROTEXIS (WHITE)

## (undated) DEVICE — MIDAS REX MR7 LUBRICANT DIFFUSER CARTRIDGE

## (undated) DEVICE — PREP DURAPREP 26CC

## (undated) DEVICE — POSITIONER FOAM EGG CRATE ULNAR 2PCS (PINK)

## (undated) DEVICE — DRAIN BLAKE 15FR BARD CHANNEL

## (undated) DEVICE — PACK EXTREMITY

## (undated) DEVICE — SOL IRR POUR H2O 250ML

## (undated) DEVICE — SUT VICRYL PLUS 0 18" CT-1 UNDYED (POP-OFF)

## (undated) DEVICE — DRAPE LIGHT HANDLE COVER (BLUE)

## (undated) DEVICE — SUT VICRYL PLUS 2-0 18" CP-2 UNDYED (POP-OFF)

## (undated) DEVICE — ELCTR BOVIE PENCIL HANDPIECE

## (undated) DEVICE — DRAIN RESERVOIR FOR JACKSON PRATT 100CC CARDINAL

## (undated) DEVICE — FOLEY TRAY 16FR LF URINE METER SURESTEP

## (undated) DEVICE — VESSEL LOOP MAXI-YELLOW  0.120" X 16"

## (undated) DEVICE — FOLEY TRAY 16FR 5CC LTX UMETER CLOSED

## (undated) DEVICE — SUT VICRYL PLUS 0 18" OS-6 (POP-OFF)

## (undated) DEVICE — GLV 7 PROTEXIS (WHITE)